# Patient Record
Sex: FEMALE | Race: WHITE | NOT HISPANIC OR LATINO
[De-identification: names, ages, dates, MRNs, and addresses within clinical notes are randomized per-mention and may not be internally consistent; named-entity substitution may affect disease eponyms.]

---

## 2019-11-12 ENCOUNTER — APPOINTMENT (OUTPATIENT)
Dept: ORTHOPEDIC SURGERY | Facility: CLINIC | Age: 84
End: 2019-11-12
Payer: MEDICARE

## 2019-11-12 VITALS
DIASTOLIC BLOOD PRESSURE: 78 MMHG | HEIGHT: 65 IN | WEIGHT: 150 LBS | SYSTOLIC BLOOD PRESSURE: 104 MMHG | HEART RATE: 85 BPM | BODY MASS INDEX: 24.99 KG/M2

## 2019-11-12 PROBLEM — Z00.00 ENCOUNTER FOR PREVENTIVE HEALTH EXAMINATION: Status: ACTIVE | Noted: 2019-11-12

## 2019-11-12 PROCEDURE — 73564 X-RAY EXAM KNEE 4 OR MORE: CPT | Mod: RT

## 2019-11-12 PROCEDURE — 99204 OFFICE O/P NEW MOD 45 MIN: CPT | Mod: 25

## 2019-11-12 PROCEDURE — 20610 DRAIN/INJ JOINT/BURSA W/O US: CPT | Mod: RT

## 2019-11-12 NOTE — PHYSICAL EXAM
[de-identified] : Constitutional - the patient is of normal build and nutrition.  She comes here with her daughter who is a nurse at the [facility] [facility] [employer].  The patient remains oriented to person, time, and  place.  Daughter says that her mother is recently lost her  and at this time sometimes is less orientated as others.   Vital signs as recorded.  The patients gait is WNL. The patient  can stand on toes and heels.  She does appear to be a little off balance at times although she is not falling I feel that balance training with physical therapy may be very beneficial.\par \par The patient has no difficulty with respiration. Respiration at rest is a normal rate. The patient is not short of breath and has not become short of breath with short ambulation. There is no audible wheezing. No coughing.\par \par Skin is normal for the patient's age. There are no abnormal masses or lymph nodes which stand out in the lower extremities.\par \par Spine - deep tendon reflexes are symmetric\par \par \par UPPER EXTREMITIES \par \par Shoulders ROM  is symmetric  and the motion is satisfactory.  There is no significant shoulder pain or limitation in motion which would make using a cane or a walker difficult. Shoulder stability and  strength are satisfactory.\par \par Circulation appears satisfactory with pedal pulses present.  There is no major edema in the lower legs. No skin tenderness or increased temperature. No major varicosities.\par \par HIP EXAMINATION the abduction and abduction as well as rotation measurements were taken with the hip in flexion.\par \par Motion\par Hip flexion                               *[Right 135   Left 135]\par Hip abduction                         *[Right 70      Left 70]\par Hip adduction                         *[Right 35     Left 35]\par Hip external  Rotation             *[Right 65     Left 65]\par Hip Internal Rotation              *[Right 20      Left 20]\par Hip Extension                         *[Right 0        Left 0]\par \par The hips have good range of motion. There is good strength across the hips. There is no crepitus in either hip. The alignment of the hips are normal.\par \par \par KNEE EXAMINATION\par \par Motion\par Right Knee has 10 to 115 degrees of motion with good medial  lateral and anterior posterior stability.  She does have an effusion in her right knee.  She has pain with compression of her patella and may have slight fullness in the popliteal area which may be a small Baker's cyst.  Her main pain is patellofemoral she has good medial lateral and anterior posterior stability at this time but I do she does walk with her knee slightly bent.             \par Left  Knee   has 0 to 135 degrees of motion with good medial lateral and anterior posterior stability.  There is no major effusion there is no Baker's cyst.  There is no significant patellofemoral crepitus.  The patient has satisfactory strength across her knees.            \par \par Ankle and foot examination\par Of the ankle has normal motion.  There is normal ankle stability.  The patient has no major abnormalities of the foot.\par \par \par \par  [de-identified] : 4 views taken the patient's right knee with an AP of the left shows severe patellofemoral degenerative arthritis with bone against bone contact on the right and probable medial narrowing of the joint space which may be consistent with a degenerative medial meniscal tear.

## 2019-11-12 NOTE — REASON FOR VISIT
[Initial Visit] : an initial visit for [Family Member] : family member [FreeTextEntry2] : right knee pain

## 2019-11-12 NOTE — DISCUSSION/SUMMARY
[de-identified] : At this time this patient has osteoarthritis in the right knee especially patellofemoral joint.  I feel she should have a balance of training go to physical therapy in the form is made out I also feel that she may be a safer using a walking device such as a cane.  We will follow her for her knee she will try Aleve 1 or 2 tablets twice a day.  Return visit in 3 months.

## 2019-11-12 NOTE — PROCEDURE
[de-identified] : Procedure Note:\par \par Anatomic Location:  Right  Knee\par \par Diagnosis:  Arthritis\par \par Procedure:  Injection of 2cc  of Marcaine 0.25% plain and Celestone 1cc, 6mg\par \par Local Spray: Ethyl Chloride.\par \par \par Patient has consented for the procedure.\par \par Injection  through a lateral parapatella approach.\par \par Patient tolerated the procedure well.\par \par Patient instructed to call the office if any reaction, fever, chills, increased erythema or swelling.   [phone].

## 2019-11-12 NOTE — HISTORY OF PRESENT ILLNESS
[de-identified] : Ms. NERY HARRIS is a 84 year female who presents to office complaining of right knee pain s/p fall on 11/10/19.\par Patient had noted feeling some form of pain/stiffness in her right knee prior to this fall, but it has since worsened following the fall onto the knee.\par She is c/o a feeling of stiffness in her knee, unable to fully bend or extend the knee without feeling an increase in her pain.\par Patient's  recently passed away, so she and her daughter are concerned that she will be unable to care for herself since she is now living alone.\par As a result, patient has not treated her pain conservatively.\par All review of systems, family history, social history, surgical history, past medical history, medications, and allergies not previously stated as positive are negative. They were reviewed by me today with the patient and documented accordingly.

## 2019-12-04 ENCOUNTER — APPOINTMENT (OUTPATIENT)
Dept: ORTHOPEDIC SURGERY | Facility: CLINIC | Age: 84
End: 2019-12-04
Payer: MEDICARE

## 2019-12-04 DIAGNOSIS — M17.11 UNILATERAL PRIMARY OSTEOARTHRITIS, RIGHT KNEE: ICD-10-CM

## 2019-12-04 PROCEDURE — 99213 OFFICE O/P EST LOW 20 MIN: CPT | Mod: 25

## 2019-12-04 PROCEDURE — 20610 DRAIN/INJ JOINT/BURSA W/O US: CPT | Mod: RT

## 2019-12-04 RX ORDER — CELECOXIB 200 MG/1
200 CAPSULE ORAL DAILY
Qty: 60 | Refills: 2 | Status: ACTIVE | OUTPATIENT
Start: 2019-12-04

## 2019-12-04 NOTE — PROCEDURE
[de-identified] : Procedure Note:\par \par Anatomic Location:  Right Knee\par \par Diagnosis:  Arthritis\par \par Procedure:  Injection of Monovisc\par \par Lot Number:           [id number]                            expiration Date:    April 2022\par \par Local Spray: Ethyl Chloride.\par \par Skin preparation with Alcohol.\par \par Patient has consented for the procedure.\par \par Injection  through a lateral parapatella approach.\par \par Patient tolerated the procedure well.\par \par Patient instructed to call the office if any reaction, fever, chills, increased erythema or swelling.   [phone].

## 2019-12-04 NOTE — HISTORY OF PRESENT ILLNESS
[de-identified] : Ms. NERY HARRIS is an 84 year female who presents to office complaining of right knee pain s/p fall on 11/10/19.\par Last office visit on 11/12/19, she received a cortisone injection into the right knee which did not help at all to relieve any of her pain.\par She was also given a PT script to improve her balance and told to utilize a cane to help with her balance as well.\par She has been doing PT at [facility]. \par However, she hasn't been taking an anti-inflammatory.

## 2019-12-04 NOTE — DISCUSSION/SUMMARY
[de-identified] : Patient does have significant osteoarthritis in her right knee but she and her daughter feel that she would like to stay on anything that is conservative they would like to try 1 of the hyaluronic acids and she was injected with Monovisc today.  She will return in 3 months for follow-up.

## 2019-12-04 NOTE — PHYSICAL EXAM
[de-identified] : \par KNEE EXAMINATION\par \par Motion\par He has not had much of a improvement in her knee.  Her most of physical exam is the same.  Right Knee has 10 to 115 degrees of motion with good medial  lateral and anterior posterior stability.  She does have an effusion in her right knee.  She has pain with compression of her patella and a suprapatella effusion which is still mild and a small Baker's cyst.  Her main pain is patellofemoral she has good medial lateral and anterior posterior stability at this time but I do she does walk with her knee slightly bent.             \par Left  Knee   has 0 to 135 degrees of motion with good medial lateral and anterior posterior stability.  There is no major effusion there is no Baker's cyst.  There is no significant patellofemoral crepitus.  The patient has satisfactory strength across her knees.            \par \par Ankle and foot examination\par Of the ankle has normal motion.  There is normal ankle stability.  The patient has no major abnormalities of the foot.\par \par \par \par

## 2021-11-21 ENCOUNTER — INPATIENT (INPATIENT)
Facility: HOSPITAL | Age: 86
LOS: 9 days | Discharge: NURSING, REHAB OR LONG-TERM CARE | DRG: 605 | End: 2021-12-01
Attending: FAMILY MEDICINE | Admitting: FAMILY MEDICINE
Payer: MEDICARE

## 2021-11-21 VITALS
WEIGHT: 110.01 LBS | RESPIRATION RATE: 16 BRPM | DIASTOLIC BLOOD PRESSURE: 73 MMHG | TEMPERATURE: 98 F | SYSTOLIC BLOOD PRESSURE: 110 MMHG | OXYGEN SATURATION: 96 % | HEART RATE: 107 BPM | HEIGHT: 66 IN

## 2021-11-21 DIAGNOSIS — Z98.890 OTHER SPECIFIED POSTPROCEDURAL STATES: Chronic | ICD-10-CM

## 2021-11-21 LAB
ALBUMIN SERPL ELPH-MCNC: 4 G/DL — SIGNIFICANT CHANGE UP (ref 3.3–5)
ALP SERPL-CCNC: 53 U/L — SIGNIFICANT CHANGE UP (ref 40–120)
ALT FLD-CCNC: 8 U/L — LOW (ref 10–45)
ANION GAP SERPL CALC-SCNC: 11 MMOL/L — SIGNIFICANT CHANGE UP (ref 5–17)
AST SERPL-CCNC: 17 U/L — SIGNIFICANT CHANGE UP (ref 10–40)
BASOPHILS # BLD AUTO: 0.01 K/UL — SIGNIFICANT CHANGE UP (ref 0–0.2)
BASOPHILS NFR BLD AUTO: 0.1 % — SIGNIFICANT CHANGE UP (ref 0–2)
BILIRUB SERPL-MCNC: 0.4 MG/DL — SIGNIFICANT CHANGE UP (ref 0.2–1.2)
BUN SERPL-MCNC: 19 MG/DL — SIGNIFICANT CHANGE UP (ref 7–23)
CALCIUM SERPL-MCNC: 9.2 MG/DL — SIGNIFICANT CHANGE UP (ref 8.4–10.5)
CHLORIDE SERPL-SCNC: 102 MMOL/L — SIGNIFICANT CHANGE UP (ref 96–108)
CO2 SERPL-SCNC: 23 MMOL/L — SIGNIFICANT CHANGE UP (ref 22–31)
CREAT SERPL-MCNC: 0.43 MG/DL — LOW (ref 0.5–1.3)
EOSINOPHIL # BLD AUTO: 0.01 K/UL — SIGNIFICANT CHANGE UP (ref 0–0.5)
EOSINOPHIL NFR BLD AUTO: 0.1 % — SIGNIFICANT CHANGE UP (ref 0–6)
GLUCOSE SERPL-MCNC: 130 MG/DL — HIGH (ref 70–99)
HCT VFR BLD CALC: 35.8 % — SIGNIFICANT CHANGE UP (ref 34.5–45)
HGB BLD-MCNC: 11.7 G/DL — SIGNIFICANT CHANGE UP (ref 11.5–15.5)
IMM GRANULOCYTES NFR BLD AUTO: 0.3 % — SIGNIFICANT CHANGE UP (ref 0–1.5)
LYMPHOCYTES # BLD AUTO: 1.36 K/UL — SIGNIFICANT CHANGE UP (ref 1–3.3)
LYMPHOCYTES # BLD AUTO: 17.9 % — SIGNIFICANT CHANGE UP (ref 13–44)
MCHC RBC-ENTMCNC: 31.7 PG — SIGNIFICANT CHANGE UP (ref 27–34)
MCHC RBC-ENTMCNC: 32.7 GM/DL — SIGNIFICANT CHANGE UP (ref 32–36)
MCV RBC AUTO: 97 FL — SIGNIFICANT CHANGE UP (ref 80–100)
MONOCYTES # BLD AUTO: 0.58 K/UL — SIGNIFICANT CHANGE UP (ref 0–0.9)
MONOCYTES NFR BLD AUTO: 7.6 % — SIGNIFICANT CHANGE UP (ref 2–14)
NEUTROPHILS # BLD AUTO: 5.61 K/UL — SIGNIFICANT CHANGE UP (ref 1.8–7.4)
NEUTROPHILS NFR BLD AUTO: 74 % — SIGNIFICANT CHANGE UP (ref 43–77)
NRBC # BLD: 0 /100 WBCS — SIGNIFICANT CHANGE UP (ref 0–0)
PLATELET # BLD AUTO: 180 K/UL — SIGNIFICANT CHANGE UP (ref 150–400)
POTASSIUM SERPL-MCNC: 4 MMOL/L — SIGNIFICANT CHANGE UP (ref 3.5–5.3)
POTASSIUM SERPL-SCNC: 4 MMOL/L — SIGNIFICANT CHANGE UP (ref 3.5–5.3)
PROT SERPL-MCNC: 7.1 G/DL — SIGNIFICANT CHANGE UP (ref 6–8.3)
RBC # BLD: 3.69 M/UL — LOW (ref 3.8–5.2)
RBC # FLD: 14.4 % — SIGNIFICANT CHANGE UP (ref 10.3–14.5)
SODIUM SERPL-SCNC: 136 MMOL/L — SIGNIFICANT CHANGE UP (ref 135–145)
WBC # BLD: 7.59 K/UL — SIGNIFICANT CHANGE UP (ref 3.8–10.5)
WBC # FLD AUTO: 7.59 K/UL — SIGNIFICANT CHANGE UP (ref 3.8–10.5)

## 2021-11-21 PROCEDURE — 72192 CT PELVIS W/O DYE: CPT | Mod: 26,MA

## 2021-11-21 PROCEDURE — 73502 X-RAY EXAM HIP UNI 2-3 VIEWS: CPT | Mod: 26,LT

## 2021-11-21 PROCEDURE — 99284 EMERGENCY DEPT VISIT MOD MDM: CPT

## 2021-11-21 PROCEDURE — 93010 ELECTROCARDIOGRAM REPORT: CPT

## 2021-11-21 PROCEDURE — 76377 3D RENDER W/INTRP POSTPROCES: CPT | Mod: 26

## 2021-11-21 RX ORDER — LIDOCAINE 4 G/100G
1 CREAM TOPICAL ONCE
Refills: 0 | Status: COMPLETED | OUTPATIENT
Start: 2021-11-21 | End: 2021-11-21

## 2021-11-21 RX ORDER — ACETAMINOPHEN 500 MG
650 TABLET ORAL ONCE
Refills: 0 | Status: COMPLETED | OUTPATIENT
Start: 2021-11-21 | End: 2021-11-21

## 2021-11-21 RX ORDER — KETOROLAC TROMETHAMINE 30 MG/ML
15 SYRINGE (ML) INJECTION ONCE
Refills: 0 | Status: DISCONTINUED | OUTPATIENT
Start: 2021-11-21 | End: 2021-11-21

## 2021-11-21 RX ADMIN — Medication 15 MILLIGRAM(S): at 19:29

## 2021-11-21 RX ADMIN — Medication 1 MILLIGRAM(S): at 23:50

## 2021-11-21 RX ADMIN — Medication 650 MILLIGRAM(S): at 15:14

## 2021-11-21 RX ADMIN — LIDOCAINE 1 PATCH: 4 CREAM TOPICAL at 19:51

## 2021-11-21 NOTE — ED PROVIDER NOTE - CARE PLAN
Principal Discharge DX:	Left hip pain   1 Principal Discharge DX:	Left hip pain  Secondary Diagnosis:	Dementia

## 2021-11-21 NOTE — ED ADULT TRIAGE NOTE - CHIEF COMPLAINT QUOTE
sent from [facility] for atraumatic hip pain  found to be in AFib by EMS

## 2021-11-21 NOTE — ED PROVIDER NOTE - PHYSICAL EXAMINATION
PHYSICAL EXAM:  CONSTITUTIONAL: Elderly, pleasant, awake, alert, and in no apparent distress.  HEAD: Atraumatic. No lesions or deformities. No tenderness of scalp.  EYES: Clear bilaterally, pupils equal, round and reactive to light.  ENMT: Airway patent, Nasal mucosa clear. Mouth with normal mucosa. Uvula is midline.   CARDIAC: Normal rate, regular rhythm.  +S1/S2.  No murmurs, rubs or gallops.  RESPIRATORY: Breathing unlabored. Breath sounds clear and equal bilaterally.  ABDOMEN:  Soft, nontender, nondistended.   NEUROLOGICAL: Alert and oriented, no focal deficits. Sensation intact x4 extremities. Strength 5/5 of lower extremities B/L.   MSK: Full ROM of the hips bilaterally. No pain with active or passive ROM. No bony tenderness of upper or lower extremities B/L.  SPINE: No midline tenderness of C-, L-, or S-spine.  SKIN: Skin warm and dry. No evidence of rashes or lesions. PHYSICAL EXAM:  CONSTITUTIONAL: Elderly, pleasant, awake, alert, and in no apparent distress.  HEAD: Atraumatic. No lesions or deformities. No tenderness of scalp.  EYES: Clear bilaterally, pupils equal, round and reactive to light.  ENMT: Airway patent, Nasal mucosa clear. Mouth with normal mucosa. Uvula is midline.   CARDIAC: Normal rate, regular rhythm.  +S1/S2.  No murmurs, rubs or gallops.  RESPIRATORY: Breathing unlabored. Breath sounds clear and equal bilaterally.  ABDOMEN:  Soft, nontender, nondistended.   NEUROLOGICAL: Alert and oriented, no focal deficits. Sensation intact x4 extremities. Strength 5/5 of lower extremities B/L.   MSK: Full ROM of the hips bilaterally. No pain with active or passive ROM. No bony tenderness of upper or lower extremities B/L.  SPINE: No midline tenderness of C-, L-, or S-spine.  SKIN: Skin warm and dry. No evidence of rashes, ecchymosis, or lesions. Extremities well perfused.

## 2021-11-21 NOTE — ED PROVIDER NOTE - OBJECTIVE STATEMENT
86 year old female with PMH dementia, A fib, mitral valve repair brought in by EMS from [facility] with L hip pain. Per EMS staff reported no witnessed falls or signs. Patient states she does not remember falling but started having right back pain radiating to lateral hip today. Patient denies numbness, tingling, fever, chills, chest pain, shortness of breath, headache. Ambulates with walker at baseline. 86 year old female with PMH dementia, A fib, mitral valve repair brought in by EMS from [facility] with L hip pain. Per EMS staff reported no witnessed falls or signs of trauma. Patient states she does not remember falling but started having right back pain radiating to lateral hip today. Patient denies numbness, tingling, fever, chills, chest pain, shortness of breath, headache. Ambulates with walker at baseline. 86 year old female with PMH dementia, A fib, mitral valve repair brought in by EMS from [facility] with L hip pain. Per EMS staff reported no witnessed falls or signs of trauma. Patient states she does not remember falling but started having left back pain radiating to left lateral hip today. Patient denies numbness, tingling, fever, chills, chest pain, shortness of breath, headache. Ambulates with walker at baseline.

## 2021-11-21 NOTE — ED PROVIDER NOTE - CLINICAL SUMMARY MEDICAL DECISION MAKING FREE TEXT BOX
Maddie Bello DO PGY-1  86 year old female with PMH dementia, a fib, mitral valve repair presents with atraumatic L hip pain this AM. Physical demonstrates full ROM of B/L hips, no bony tenderness, and well perfused extremities. No signs of trauma. Patient appears clinically well, pain likely radicular vs. osteoarthritis. Will xray hip and pelvis, treat pain, and re-evaluate. Maddie Bello DO PGY-1  86 year old female with PMH dementia, a fib, mitral valve repair presents with atraumatic L hip pain this AM. Physical demonstrates full ROM of B/L hips, no bony tenderness, and well perfused extremities. No signs of trauma. Patient appears clinically well, pain likely radicular vs. osteoarthritis. Will xray hip and pelvis, treat pain, and re-evaluate.    BRUNO Moss, Attending: seen with resident and reviewed note.    Elderly female, dementia living at [facility] p/w likely atraumatic L hip pain. Pt found sitting in chair and did states she fell even tho less likely given assisted living status, dementia, and no signs of trauma. No prior hip fxs/surgeries. No f/c. No other complaints.     Mild painful ROM with external rotation L hip. Distal NV exam intact. Compartments soft. No abd ttp. No inguinal ttp or masses. No other signs of trauma.    Suspect MSK cause of pain such as peripheral nerve issue vs arthritis. However given possibility of fall and pain will purse occult fx. No concern for vascular issue or septic joint. Daughter amenable to plan.

## 2021-11-21 NOTE — ED ADULT NURSE NOTE - OBJECTIVE STATEMENT
85 y/o female presents to ED via EMS from [facility] c/o L hip pain, denies any injury, falls, or hx hip pain. Denies chest pain, sob, numbness or tingling, urinary or bowel symptoms, n/v/d. Pt has hx dementia and is normally a&ox2, and afib on Eliquis. A&Ox2 to person and place, no edema, abd soft nontender, no obvious bony deformity. Normal ROM of L hip, not tender to palp. Skin warm dry and intact.

## 2021-11-21 NOTE — ED PROVIDER NOTE - PROGRESS NOTE DETAILS
Maddie Bello, DO PGY-1   Patient continuing to require pain medication in ED. Discussion with patient's daughter who fears that patient will not be able to perform ADLs at [facility] assisted [facility], as she is not able to ambulate well. Plan to admit for pain control and PT/OT evaluation. CT pending.

## 2021-11-22 DIAGNOSIS — M25.552 PAIN IN LEFT HIP: ICD-10-CM

## 2021-11-22 LAB — SARS-COV-2 RNA SPEC QL NAA+PROBE: SIGNIFICANT CHANGE UP

## 2021-11-22 RX ORDER — DIVALPROEX SODIUM 500 MG/1
500 TABLET, DELAYED RELEASE ORAL DAILY
Refills: 0 | Status: DISCONTINUED | OUTPATIENT
Start: 2021-11-22 | End: 2021-11-22

## 2021-11-22 RX ORDER — DIVALPROEX SODIUM 500 MG/1
250 TABLET, DELAYED RELEASE ORAL DAILY
Refills: 0 | Status: DISCONTINUED | OUTPATIENT
Start: 2021-11-22 | End: 2021-11-22

## 2021-11-22 RX ORDER — HALOPERIDOL DECANOATE 100 MG/ML
1 INJECTION INTRAMUSCULAR ONCE
Refills: 0 | Status: COMPLETED | OUTPATIENT
Start: 2021-11-22 | End: 2021-11-22

## 2021-11-22 RX ORDER — DIVALPROEX SODIUM 500 MG/1
500 TABLET, DELAYED RELEASE ORAL DAILY
Refills: 0 | Status: DISCONTINUED | OUTPATIENT
Start: 2021-11-22 | End: 2021-12-01

## 2021-11-22 RX ORDER — APIXABAN 2.5 MG/1
2.5 TABLET, FILM COATED ORAL
Refills: 0 | Status: DISCONTINUED | OUTPATIENT
Start: 2021-11-22 | End: 2021-12-01

## 2021-11-22 RX ADMIN — HALOPERIDOL DECANOATE 1 MILLIGRAM(S): 100 INJECTION INTRAMUSCULAR at 18:25

## 2021-11-22 RX ADMIN — LIDOCAINE 1 PATCH: 4 CREAM TOPICAL at 14:27

## 2021-11-22 RX ADMIN — APIXABAN 2.5 MILLIGRAM(S): 2.5 TABLET, FILM COATED ORAL at 17:19

## 2021-11-22 RX ADMIN — LIDOCAINE 1 PATCH: 4 CREAM TOPICAL at 11:43

## 2021-11-22 RX ADMIN — HALOPERIDOL DECANOATE 1 MILLIGRAM(S): 100 INJECTION INTRAMUSCULAR at 23:10

## 2021-11-22 NOTE — ED ADULT NURSE REASSESSMENT NOTE - NS ED NURSE REASSESS COMMENT FT1
1425 Daughter at bedside and pt provided lunch and ate most of ot Pt assisted to the bathroom via wheelchair pt pending a bed assignment Carmelina

## 2021-11-22 NOTE — ED ADULT NURSE REASSESSMENT NOTE - NS ED NURSE REASSESS COMMENT FT1
1515 Emily Np covering aware of medication Depakote 500 Mg once daily and Eliquis 2.5  once a day to fix an d tylenols ordered order to be written Prn Report given to holding RN and to be brought over Carmelina

## 2021-11-22 NOTE — H&P ADULT - HISTORY OF PRESENT ILLNESS
SDAT, wanders all day in assisted refused to walk sio dtr brought her to the ed co acute  pain l hip  denied fall but now ad mits she fell, no bruises CT , xr neg   stil refuses to ambulates  confabulates , confused at times as usual

## 2021-11-22 NOTE — ED ADULT NURSE REASSESSMENT NOTE - NS ED NURSE REASSESS COMMENT FT1
0889 Safety maintained pt alert to herself and placed in front of nursed station for supervision Pending bed assignment Carmelina

## 2021-11-22 NOTE — H&P ADULT - NSHPSOCIALHISTORY_GEN_ALL_CORE
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

## 2021-11-22 NOTE — H&P ADULT - NSHPREVIEWOFSYSTEMS_GEN_ALL_CORE
denies fc, no ha/d, awake alert confused  no co no ch vision  hears  no co no ch  no cp nop p[alp nop sob  nop cough no sp  no nvcd   nofdi  still co pain left hip but less no other a&P   no polyudd  no r/i/d skin  AOSN

## 2021-11-22 NOTE — H&P ADULT - REASON FOR ADMISSION
refused to ambuklate co pain L hip at first denied fall but now admits she fell , hX dementia memory poor but tessy wallis

## 2021-11-22 NOTE — H&P ADULT - NSHPLABSRESULTS_GEN_ALL_CORE
"[facility] Consult/H&P Note      Patient's Name/MRN: Mari Dillon, [id number]    Admit Date:11/19/2021  Today's Date: 11/19/2021   Length of stay:  LOS: 0 days   Room #: -ROOM 4/04      Reason for consult/chief complaint: Flank pain nausea  ID/HPI: Mari Dillon is a 51 y.o. female patient who presents with flank pain that started approximately 1 month ago, and is associated with nausea/vomiting. In the ER imaging/CT demonstrated 4 mm left proximal ureteral stone. There are no other stones appreciated.  She was originally seen 1 month ago in the ER at that time treated for an E. coli urinary tract infection.  Stone was in a similar location.  She was seen by Dr. Riki Petty please see below discussion from his clinic note for further details.    Labs reviewed:   Wbc 7.4  Serum Cr: 0.73  Urinalysis showed: Negative for signs of infection    The patient is hemodynamically stable.   The patient denies current use of blood thinning medication    This is the patient's 2# stone episode.   Previous treatment included: Medical expulsion therapy and passed on its own  Current preventative methods/medications: None    She was seen 11/2/21 by Dr. Petty at [facility],   \"In the office today, we reviewed Ms. Dillon's recent urgent care visit due to suspected UTI with urgency, frequency, and dysuria. She had a UTI and the urine culture was positive for E. coli. She was given augmentin and has not been taking it consistently. I advised she finish her augmentin.     She had a CT done at urgent care and we reviewed the imaging showing 5.4 mm left promxial ureteral stone. I explained that due to her stone size I recommended a L CULTS. I advised she strain her urine and that if she passes the stone we can cancel the surgery.     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 She developed an anastomotic leak and is s/p ileostomy 6/11/2021. She reports she was hopsitalized for ~1.5 months at that " "time. I explained her stone may have formed during this time. \"     Past Medical History:   Past Medical History:   Diagnosis Date   • Anxiety 6/9/2009        Past Surgical History:   Past Surgical History:   Procedure Laterality Date   • PB EXPLORATORY OF ABDOMEN  6/11/2021    Procedure: LAPAROTOMY, EXPLORATORY;  Surgeon: Maryam Wood M.D.;  Location: [facility];  Service: General   • ILEOSTOMY  6/11/2021    Procedure: WOUND VAC PLACEMENT ;  Surgeon: Maryam Wood M.D.;  Location: [facility];  Service: General   • PB EXPLORATORY OF ABDOMEN N/A 6/4/2021    Procedure: LAPAROTOMY, EXPLORATORY, WITH WASH OUT;  Surgeon: Maryam Wood M.D.;  Location: [facility];  Service: General   • PB EXPLORATORY OF ABDOMEN N/A 5/28/2021    Procedure: LAPAROTOMY, EXPLORATORY- RESECTION OF ILEOCECAL ANASTATMOSIS.;  Surgeon: Maryam Wood M.D.;  Location: [facility];  Service: General   • PB EXPLORATORY OF ABDOMEN  5/22/2021    Procedure: LAPAROTOMY, EXPLORATORY;  Surgeon: Maryam Wood M.D.;  Location: [facility];  Service: General   • HEMICOLECTOMY, RIGHT  5/22/2021    Procedure: HEMICOLECTOMY, RIGHT, SIDE TO SIDE ANASTOMOSIS;  Surgeon: Maryam Wood M.D.;  Location: [facility];  Service: General   • SHOULDER DECOMPRESSION ARTHROSCOPIC Right 12/24/2018    Procedure: SHOULDER DECOMPRESSION ARTHROSCOPIC - SUBACROMIAL;  Surgeon: Tristian Garcia M.D.;  Location: [facility];  Service: Orthopedics   • CLAVICLE DISTAL EXCISION Left 12/24/2018    Procedure: CLAVICLE DISTAL EXCISION;  Surgeon: Tristian Garcia M.D.;  Location: [facility];  Service: Orthopedics   • SHOULDER ARTHROSCOPY W/ BICIPITAL TENODESIS REPAIR Left 12/24/2018    Procedure: SHOULDER ARTHROSCOPY W/ BICIPITAL TENODESIS REPAIR - AND PACKER;  Surgeon: Tristian Garcia M.D.;  Location: [facility];  Service: Orthopedics   • SHOULDER MANIPULATION Left " 2018    Procedure: SHOULDER MANIPULATION;  Surgeon: Tristian Garcia M.D.;  Location: SUR[facility];  Service: Orthopedics   • SHOULDER ARTHROSCOPY W/ BICIPITAL TENODESIS REPAIR Right 6/15/2015    Procedure: SHOULDER ARTHROSCOPY W/ BICIPITAL TENODESIS, SYNOVECTOMY;  Surgeon: Tristian Garcia M.D.;  Location: [facility];  Service:    • CLAVICLE DISTAL EXCISION Right 6/15/2015    Procedure: CLAVICLE DISTAL EXCISION;  Surgeon: Tristian Garcia M.D.;  Location: [facility];  Service:    • SHOULDER DECOMPRESSION Right 6/15/2015    Procedure: SHOULDER DECOMPRESSION MINI INCISION ;  Surgeon: Tristian Garcia M.D.;  Location: [facility];  Service:    • OTHER      nose   • APPENDECTOMY     • PB  DELIVERY ONLY          Family History:   Family History   Problem Relation Age of Onset   • Hypertension Mother    • Diabetes Mother    • Cancer Maternal Grandmother         breast         Social History:   Social History     Tobacco Use   • Smoking status: Former Smoker     Years: 15.00     Quit date: 2005     Years since quittin.8   • Smokeless tobacco: Never Used   • Tobacco comment: 3-4 years ago   Vaping Use   • Vaping Use: Never used   Substance Use Topics   • Alcohol use: No     Alcohol/week: 0.0 oz   • Drug use: Yes     Types: Inhaled, Marijuana     Comment: Marijuana daily      Social History     Social History Narrative    ** Merged History Encounter **             Allergies: she Patient has no known allergies.    Medications:   (Not in a hospital admission)        Review of Systems  Review of Systems   Constitutional: Negative.    HENT: Negative.    Eyes: Negative.    Respiratory: Negative.    Cardiovascular: Negative.    Gastrointestinal: Negative.    Genitourinary: Positive for flank pain and hematuria.   Skin: Negative.    Neurological: Negative.    Endo/Heme/Allergies: Negative.    Psychiatric/Behavioral: Negative.      "    Physical Exam  VITAL SIGNS: /72   Pulse 78   Temp 36.2 °C (97.2 °F) (Temporal)   Resp 16   Ht 1.753 m (5' 9\")   Wt 65 kg (143 lb 4.8 oz)   LMP 11/13/2021 (Exact Date)   SpO2 99%   BMI 21.16 kg/m²   Physical Exam  Vitals reviewed.   HENT:      Head: Normocephalic and atraumatic.   Eyes:      Conjunctiva/sclera: Conjunctivae normal.      Pupils: Pupils are equal, round, and reactive to light.   Cardiovascular:      Rate and Rhythm: Normal rate and regular rhythm.   Pulmonary:      Effort: Pulmonary effort is normal.      Breath sounds: Normal breath sounds.   Abdominal:      General: Abdomen is flat.      Palpations: Abdomen is soft.   Musculoskeletal:         General: No tenderness.      Cervical back: Normal range of motion and neck supple.   Skin:     General: Skin is warm and dry.   Neurological:      Mental Status: She is alert and oriented to person, place, and time.   Psychiatric:         Mood and Affect: Mood and affect normal.         Cognition and Memory: Memory normal.         Judgment: Judgment normal.           Labs:  Recent Labs     11/19/21  0902   WBC 7.4   RBC 4.86   HEMOGLOBIN 12.9   HEMATOCRIT 40.4   MCV 83.1   MCH 26.5*   MCHC 31.9*   RDW 58.3*   PLATELETCT 314   MPV 9.8     Recent Labs     11/19/21  0902   SODIUM 138   POTASSIUM 3.9   CHLORIDE 104   CO2 21   GLUCOSE 90   BUN 10   CREATININE 0.73   CALCIUM 9.2         Glucose:  Recent Labs     11/19/21  0902   GLUCOSE 90     Coags:  No results for input(s): INR in the last 72 hours.      Urinalysis:   Recent Labs     11/19/21  0902   COLORURINE Yellow   CLARITY Clear   SPECGRAVITY 1.020   PHURINE 6.5   GLUCOSEUR Negative   KETONES 15*   NITRITE Negative   OCCULTBLOOD Negative       Imaging:  CT-ABDOMEN-PELVIS WITH   Final Result      1.  No evidence of bowel obstruction or focal inflammatory change.      2.  Complex left ovarian cyst which measures 5 x 2 cm in size.      3.  Again seen 6 mm left proximal ureteral stone with " minimal dilatation of the left ureter proximal to that area.      4.  Fatty liver.      US-PELVIC COMPLETE (TRANSABDOMINAL/TRANSVAGINAL) (COMBO)    (Results Pending)       @lastct@     Assessment:    Mari Dillon is a 51 y.o. female patient with left proximal 4 mm ureteral stone without signs of infection.  The stone has not migrated distally in 1 month when you compare to previous CAT scans.  The patient reports malaise nausea vomiting and overall abdominal discomfort that is preventing her from carrying on her normal activities.  She desires definitive stone treatment.    Plan:  To OR for left ureteroscopic treatment of kidney/ureteral stones.   After OR patient would like to be discharged this evening and as long as she is appropriately recovering in the recovery room I think this is more than possible.  Disposition will be dictated in operative report and discussed in subsequent urology team progress notes.    Risks of treatment of kidney stones (ureteroscopy or cysto with ureteral stent):  inability to access ureter, the need for second procedures, the possibility of negative ureteroscopy,  stent discomfort until this is removed, bleeding, infection, ureteral injury or stricture, bladder injury, post op urinary retention requiring david catheter, and the general pulmonary and cardiovascular risks associated with anesthesia.   Risks of not treating include:  Pain, possible growth of kidney stones, obstruction of kidney causing kidney failure, obstruction with infection can cause urosepsis and death.   Alternatives to treating ureteral or kidney stones:  Medical expulsive therapy with pain control and reimaging, other stone surgeries.   Benefits include:  Removal of ureteral stones, improvement in pain and drainage of kidney, and possibly faster return to work compared to medical expulsive therapy.     This is a general summary, not an exhaustive list, and some other rare but known  risks/benefits/alternatives are published in urologic literature.     Jose Omalley MD  [facility] [place]          xr, ct neg for fx  needs MRI of Hip

## 2021-11-22 NOTE — H&P ADULT - NSHPPHYSICALEXAM_GEN_ALL_CORE
vss nad   nc at   natalie cwnl mmm  no cb no tm  lungs ctab no rr, end insp wheeze x 1 r cleared after 1 breath  rrrm  abd flat bs+ soft nt no g  no cce  skin cdi  hm- vv mild le bl  neuro speech clear sffecr approp bur gets agitated and wants to be oovb   m, j, o, cog all impaired by advanced mem loss  sens gr nl motor nf atmae  gait was indep pta

## 2021-11-22 NOTE — H&P ADULT - ASSESSMENT
needs PT eval, if patient can anmbulate can return to [facility] , if she has pain and will not ambulate she willneed furtherwu and possibl;ey CHAN  on eliquis at home pta and mvi  and depakote 500mg pauly qam  Disc c Dtr maged [phone]  1. ?fall  2. Pain L hip r/o Fx  3. Alzheimers type dementia no behavior issues - depakote - can decr doase and observe  4PAF -   5, MVR

## 2021-11-22 NOTE — ED ADULT NURSE REASSESSMENT NOTE - NS ED NURSE REASSESS COMMENT FT1
1106 Pt tolieted and assisted to the bathroom and Pt urinated Pt non able to stand and weight bare n her left leg

## 2021-11-23 LAB
COVID-19 NUCLEOCAPSID GAM AB INTERP: POSITIVE
COVID-19 NUCLEOCAPSID TOTAL GAM ANTIBODY RESULT: 26.4 INDEX — HIGH
COVID-19 SPIKE DOMAIN AB INTERP: POSITIVE
COVID-19 SPIKE DOMAIN ANTIBODY RESULT: >250 U/ML — HIGH
CRP SERPL-MCNC: <3 MG/L — SIGNIFICANT CHANGE UP (ref 0–4)
SARS-COV-2 IGG+IGM SERPL QL IA: 26.4 INDEX — HIGH
SARS-COV-2 IGG+IGM SERPL QL IA: >250 U/ML — HIGH
SARS-COV-2 IGG+IGM SERPL QL IA: POSITIVE
SARS-COV-2 IGG+IGM SERPL QL IA: POSITIVE

## 2021-11-23 PROCEDURE — 93010 ELECTROCARDIOGRAM REPORT: CPT

## 2021-11-23 RX ORDER — METOPROLOL TARTRATE 50 MG
25 TABLET ORAL ONCE
Refills: 0 | Status: COMPLETED | OUTPATIENT
Start: 2021-11-23 | End: 2021-11-23

## 2021-11-23 RX ADMIN — APIXABAN 2.5 MILLIGRAM(S): 2.5 TABLET, FILM COATED ORAL at 18:54

## 2021-11-23 RX ADMIN — APIXABAN 2.5 MILLIGRAM(S): 2.5 TABLET, FILM COATED ORAL at 05:36

## 2021-11-23 RX ADMIN — Medication 25 MILLIGRAM(S): at 21:29

## 2021-11-23 NOTE — PHYSICAL THERAPY INITIAL EVALUATION ADULT - PERTINENT HX OF CURRENT PROBLEM, REHAB EVAL
86 y/oF  with Alzheimers dementia, admitted 11/22, from [facility]. Pt ambulates independently at baseline (wanders), refused to walk, sent her to ED. Pt initially with acute pain to L hip, at first denied, then admitted to fall. No bruising, CT and xray neg.

## 2021-11-24 LAB
ERYTHROCYTE [SEDIMENTATION RATE] IN BLOOD: 30 MM/HR — HIGH (ref 0–20)
ERYTHROCYTE [SEDIMENTATION RATE] IN BLOOD: 34 MM/HR — HIGH (ref 0–20)

## 2021-11-24 PROCEDURE — 72195 MRI PELVIS W/O DYE: CPT | Mod: 26

## 2021-11-24 RX ORDER — METOPROLOL TARTRATE 50 MG
25 TABLET ORAL DAILY
Refills: 0 | Status: DISCONTINUED | OUTPATIENT
Start: 2021-11-25 | End: 2021-12-01

## 2021-11-24 RX ADMIN — APIXABAN 2.5 MILLIGRAM(S): 2.5 TABLET, FILM COATED ORAL at 17:55

## 2021-11-24 RX ADMIN — Medication 1 MILLIGRAM(S): at 19:52

## 2021-11-24 RX ADMIN — APIXABAN 2.5 MILLIGRAM(S): 2.5 TABLET, FILM COATED ORAL at 04:59

## 2021-11-24 RX ADMIN — DIVALPROEX SODIUM 500 MILLIGRAM(S): 500 TABLET, DELAYED RELEASE ORAL at 12:31

## 2021-11-24 NOTE — CONSULT NOTE ADULT - REASON FOR ADMISSION
refused to ambulate co pain L hip at first denied fall but now admits she fell , hX dementia memory poor but tessy wallis
refused to ambuklate co pain L hip at first denied fall but now admits she fell , hX dementia memory poor but tessy wallis

## 2021-11-24 NOTE — CHART NOTE - NSCHARTNOTEFT_GEN_A_CORE
During the night -130's. Pt seen at the bedside. Denies chest pain, palpitations, dyspnea, abdominal or back pain. Pt with Hx of Afib, on Eliquis. No fever, no pain complaints. Pt States " My heart races sometimes". D/w Dr. Dewitt, lopressor 25 mg tab x1, will reevaluate in AM. Monitoring closely. During the night -130's. Pt seen at the bedside. Denies chest pain, palpitations, dyspnea, abdominal or back pain. Pt with Hx of Afib, on Eliquis. No fever, no pain complaints. Pt States " My heart races sometimes". EKG stat. D/w Dr. Dewitt, lopressor 25 mg tab x1, will reevaluate in AM. Monitoring closely.

## 2021-11-24 NOTE — CONSULT NOTE ADULT - SUBJECTIVE AND OBJECTIVE BOX
86y Female admitted to medicine pending an MRI for left hip pain. Per primary team, patient had developed some left hip pain and was walking with a limp. Patient seen and examined, denies any hip pain. Denies injury. Patient denies numbness/tingling/burning in the affected extremity. No other bone/joint complaints. Patient is a community ambulator at baseline      PAST MEDICAL & SURGICAL HISTORY:  Atrial fibrillation    Asthma    History of repair of mitral valve      MEDICATIONS  (STANDING):  apixaban 2.5 milliGRAM(s) Oral two times a day  diVALproex  milliGRAM(s) Oral daily    MEDICATIONS  (PRN):    Allergies    penicillin (Unknown)    Intolerances                              11.7   7.59  )-----------( 180      ( 21 Nov 2021 19:34 )             35.8     11-21    136  |  102  |  19  ----------------------------<  130<H>  4.0   |  23  |  0.43<L>    Ca    9.2      21 Nov 2021 19:34    TPro  7.1  /  Alb  4.0  /  TBili  0.4  /  DBili  x   /  AST  17  /  ALT  8<L>  /  AlkPhos  53  11-21        T(C): 37.2 (11-23-21 @ 12:41), Max: 37.2 (11-23-21 @ 12:41)  HR: 96 (11-23-21 @ 12:41) (85 - 108)  BP: 123/70 (11-23-21 @ 12:41) (117/58 - 145/79)  RR: 18 (11-23-21 @ 12:41) (17 - 18)  SpO2: 95% (11-23-21 @ 12:41) (94% - 98%)  Wt(kg): --    PE   LLE:  Skin intact; No ecchymosis/soft tissue swelling  Compartments soft; No  TTP about hip. No TTP to knee/leg/ankle/foot   ROM fully intact, active and passive  Able to SLR   Motor intact GS/TA/FHL/EHL  SILT L2-S1  DP/PT pulses 2+       Imaging:  XR demonstrating arthritic changes     86y Female with probable left hip arthritis   - FU ESR/CRP  - FU MRI  - Pain control  - Final plans pending imaging and labs
CHIEF COMPLAINT:Patient is a 86y old  Female who presents with a chief complaint of refused to ambuklate co pain L hip at first denied fall but now admits she fell , hX dementia memory poor but usu aurora wallis (24 Nov    HPI:  bimal KHANNA all day in [facility] refused to walk sio dtr brought her to the ed co acute  pain l hip  denied fall but now , no bruises CT , xr neg stil refuses to ambulates  confabulates , confused at times as usual  pt with rapid a,fib      PAST MEDICAL & SURGICAL HISTORY:  Atrial fibrillation    Asthma    History of repair of mitral valve    MEDICATIONS  (STANDING):  apixaban 2.5 milliGRAM(s) Oral two times a day  diVALproex  milliGRAM(s) Oral daily  LORazepam Tablet 1 milliGRAM(s) Oral once    MEDICATIONS  (PRN):      FAMILY HISTORY:      SOCIAL HISTORY:    [ ] Non-smoker  [ ] Smoker  [ ] Alcohol    Allergies    penicillin (Unknown)    Intolerances    	    REVIEW OF SYSTEMS:  CONSTITUTIONAL: No fever, weight loss, or fatigue  EYES: No eye pain, visual disturbances, or discharge  ENT:  No difficulty hearing, tinnitus, vertigo; No sinus or throat pain  NECK: No pain or stiffness  RESPIRATORY: No cough, wheezing, chills or hemoptysis; No Shortness of Breath  CARDIOVASCULAR: No chest pain, palpitations, passing out, dizziness, or leg swelling  GASTROINTESTINAL: No abdominal or epigastric pain. No nausea, vomiting, or hematemesis; No diarrhea or constipation. No melena or hematochezia.  GENITOURINARY: No dysuria, frequency, hematuria, or incontinence  NEUROLOGICAL: No headaches, memory loss, loss of strength, numbness, or tremors  SKIN: No itching, burning, rashes, or lesions   LYMPH Nodes: No enlarged glands  ENDOCRINE: No heat or cold intolerance; No hair loss  MUSCULOSKELETAL: No joint pain or swelling; No muscle, back, or extremity pain  PSYCHIATRIC: No depression, anxiety, mood swings, or difficulty sleeping  HEME/LYMPH: No easy bruising, or bleeding gums  ALLERGY AND IMMUNOLOGIC: No hives or eczema	    [ ] All others negative	  [x ] Unable to obtain    PHYSICAL EXAM:  T(C): 36.9 (11-24-21 @ 08:36), Max: 37.2 (11-23-21 @ 12:41)  HR: 88 (11-24-21 @ 08:36) (88 - 128)  BP: 120/62 (11-24-21 @ 08:36) (120/62 - 143/81)  RR: 18 (11-24-21 @ 08:36) (18 - 18)  SpO2: 93% (11-24-21 @ 08:36) (93% - 98%)  Wt(kg): --  I&O's Summary    23 Nov 2021 07:01  -  24 Nov 2021 07:00  --------------------------------------------------------  IN: 480 mL / OUT: 0 mL / NET: 480 mL        Appearance: Normal	  HEENT:   Normal oral mucosa, PERRL, EOMI	  Lymphatic: No lymphadenopathy  Cardiovascular: Normal S1 S2, No JVD, +murmurs, No edema  Respiratory: Lungs clear to auscultation	  Psychiatry: A & O x 3, Mood & affect appropriate  Gastrointestinal:  Soft, Non-tender, + BS	  Skin: No rashes, No ecchymoses, No cyanosis	  Neurologic: Non-focal  Extremities: Normal range of motion, No clubbing, cyanosis or edema  Vascular: Peripheral pulses palpable 2+ bilaterally    TELEMETRY: 	    ECG:  	  RADIOLOGY:  OTHER: 	  	  LABS:	 	    CARDIAC MARKERS:                proBNP:   Lipid Profile:   HgA1c:   TSH:       PREVIOUS DIAGNOSTIC TESTING:      < from: 12 Lead ECG (11.21.21 @ 20:09) >  Diagnosis Line ATRIAL FIBRILLATION WITH PREMATURE VENTRICULAR OR ABERRANTLY CONDUCTED COMPLEXES  NONSPECIFIC ST AND T WAVE ABNORMALITY  ABNORMAL ECG  NO PREVIOUS ECGS AVAILABLE    < from: CT Pelvis Bony Only No Cont (11.21.21 @ 16:26) >  Mildly limited by decreased bone mineralization. No displaced fracture.  Degenerative changes as detailed above.    During the night -130's. Pt seen at the bedside. Denies chest pain, palpitations, dyspnea, abdominal or back pain. Pt with Hx of Afib, on Eliquis. No fever, no pain complaints. Pt States " My heart races sometimes". EKG stat. D/w Dr. Dewitt, lopressor 25 mg tab x1, will reevaluate in AM. Monitoring closely.

## 2021-11-24 NOTE — CONSULT NOTE ADULT - ASSESSMENT
SDAT, wanders all day in [facility] refused to walk sio dtr brought her to the ed co acute  pain l hip  denied fall but now , no bruises CT , xr neg stil refuses to ambulates  confabulates , confused at times as usual  pt is a 87 yo female with hx of chronic a.fib on AC s/p fall. ?etiology  check orthosttaic  agree with metioprolol er 25 mg daily for rate control  tsh  physical therapy for gait stability  lipid panel  awaiting MR  chest x ray

## 2021-11-25 ENCOUNTER — TRANSCRIPTION ENCOUNTER (OUTPATIENT)
Age: 86
End: 2021-11-25

## 2021-11-25 PROCEDURE — 71045 X-RAY EXAM CHEST 1 VIEW: CPT | Mod: 26

## 2021-11-25 RX ORDER — ACETAMINOPHEN 500 MG
650 TABLET ORAL EVERY 6 HOURS
Refills: 0 | Status: DISCONTINUED | OUTPATIENT
Start: 2021-11-25 | End: 2021-12-01

## 2021-11-25 RX ADMIN — Medication 1 MILLIGRAM(S): at 21:51

## 2021-11-25 RX ADMIN — Medication 25 MILLIGRAM(S): at 06:05

## 2021-11-25 RX ADMIN — Medication 650 MILLIGRAM(S): at 17:21

## 2021-11-25 RX ADMIN — APIXABAN 2.5 MILLIGRAM(S): 2.5 TABLET, FILM COATED ORAL at 06:05

## 2021-11-25 RX ADMIN — DIVALPROEX SODIUM 500 MILLIGRAM(S): 500 TABLET, DELAYED RELEASE ORAL at 12:02

## 2021-11-25 RX ADMIN — Medication 650 MILLIGRAM(S): at 18:15

## 2021-11-25 RX ADMIN — APIXABAN 2.5 MILLIGRAM(S): 2.5 TABLET, FILM COATED ORAL at 17:21

## 2021-11-25 NOTE — DISCHARGE NOTE PROVIDER - NSDCCPCAREPLAN_GEN_ALL_CORE_FT
PRINCIPAL DISCHARGE DIAGNOSIS  Diagnosis: Left hip pain  Assessment and Plan of Treatment: Seen by Orthopedic  MR of bony Pelvis showed no acute fracture  Physical Therapy for increase mobility and strengthening      SECONDARY DISCHARGE DIAGNOSES  Diagnosis: Dementia  Assessment and Plan of Treatment: Supportive care  Frequent reorientation

## 2021-11-25 NOTE — DISCHARGE NOTE PROVIDER - CARE PROVIDER_API CALL
George Dewitt  [address] [address]  Phone: [phone]  Fax: [phone]  Follow Up Time:

## 2021-11-25 NOTE — DISCHARGE NOTE PROVIDER - DETAILS OF MALNUTRITION DIAGNOSIS/DIAGNOSES
This patient has been assessed with a concern for Malnutrition and was treated during this hospitalization for the following Nutrition diagnosis/diagnoses:     -  12/01/2021: Moderate protein-calorie malnutrition   -  12/01/2021: Underweight (BMI < 19)

## 2021-11-25 NOTE — DISCHARGE NOTE PROVIDER - HOSPITAL COURSE
86 year old female with PMH dementia, A fib, mitral valve repair brought in by EMS from [facility] with L hip pain. Per EMS staff reported no witnessed falls or signs of trauma. Patient states she does not remember falling but started having left back pain radiating to left lateral hip today.    MR bony pelvis showed no acute fracture. Mild BL hip degenerative changes with trace effusions.  PT recomm   86 year old female with PMH dementia, A fib, mitral valve repair brought in by EMS from [facility] with L hip pain. Per EMS staff reported no witnessed falls or signs of trauma. Patient states she does not remember falling but started having left back pain radiating to left lateral hip today.    MR bony pelvis showed no acute fracture. Mild BL hip degenerative changes with trace effusions.  PT recommended Rehab. Pt will return to [place]. 86 year old female with PMH dementia, A fib, mitral valve repair brought in by EMS from [facility] with L hip pain. Per EMS staff reported no witnessed falls or signs of trauma. Patient states she does not remember falling but started having left back pain radiating to left lateral hip today.    MR bony pelvis showed no acute fracture. Mild BL hip degenerative changes with trace effusions.  PT recommended Rehab. Pt will return to [place].   DC wasdelayed by application to [facility] by Dtr tthou PT rec Home to [facility], Insurance did not approve [facility] so pt was dc home to [facility] next day w Home Care and outpt PT  pt will fu c me day after dc at [facility] 86 year old female with PMH dementia, A fib, mitral valve repair brought in by EMS from [facility] with L hip pain. Per EMS staff reported no witnessed falls or signs of trauma. Patient states she does not remember falling but started having left back pain radiating to left lateral hip today.    MR bony pelvis showed no acute fracture. Mild BL hip degenerative changes with trace effusions.  PT recommended Rehab. Pt will return to [place].   DC wasdelayed by application to [facility] by Dtr tthou PT rec Home to [facility], Insurance did not approve [facility] so pt was dc home to [facility] next day w Home Care and outpt PT  pt will fu c me day after dc at [facility]  greater than one hour spent on this encounter and discharge. see also daily progress note day of discharge

## 2021-11-25 NOTE — DISCHARGE NOTE PROVIDER - NSDCMRMEDTOKEN_GEN_ALL_CORE_FT
acetaminophen 500 mg oral tablet: 2 tab(s) orally 3 times a day  divalproex sodium 500 mg oral tablet, extended release: 1 tab(s) orally once a day  Eliquis 2.5 mg oral tablet: 1 tab(s) orally 2 times a day  Multiple Vitamins oral tablet: 1 tab(s) orally once a day   acetaminophen 500 mg oral tablet: 2 tab(s) orally 3 times a day   divalproex sodium 500 mg oral tablet, extended release: 1 tab(s) orally once a day  Eliquis 2.5 mg oral tablet: 1 tab(s) orally 2 times a day  metoprolol succinate 25 mg oral tablet, extended release: 1 tab(s) orally once a day  Multiple Vitamins oral tablet: 1 tab(s) orally once a day

## 2021-11-26 LAB
ANION GAP SERPL CALC-SCNC: 12 MMOL/L — SIGNIFICANT CHANGE UP (ref 5–17)
BUN SERPL-MCNC: 32 MG/DL — HIGH (ref 7–23)
CALCIUM SERPL-MCNC: 9.3 MG/DL — SIGNIFICANT CHANGE UP (ref 8.4–10.5)
CHLORIDE SERPL-SCNC: 102 MMOL/L — SIGNIFICANT CHANGE UP (ref 96–108)
CO2 SERPL-SCNC: 26 MMOL/L — SIGNIFICANT CHANGE UP (ref 22–31)
CREAT SERPL-MCNC: 0.56 MG/DL — SIGNIFICANT CHANGE UP (ref 0.5–1.3)
GLUCOSE SERPL-MCNC: 100 MG/DL — HIGH (ref 70–99)
HCT VFR BLD CALC: 38.2 % — SIGNIFICANT CHANGE UP (ref 34.5–45)
HGB BLD-MCNC: 12.3 G/DL — SIGNIFICANT CHANGE UP (ref 11.5–15.5)
MCHC RBC-ENTMCNC: 31.1 PG — SIGNIFICANT CHANGE UP (ref 27–34)
MCHC RBC-ENTMCNC: 32.2 GM/DL — SIGNIFICANT CHANGE UP (ref 32–36)
MCV RBC AUTO: 96.7 FL — SIGNIFICANT CHANGE UP (ref 80–100)
NRBC # BLD: 0 /100 WBCS — SIGNIFICANT CHANGE UP (ref 0–0)
PLATELET # BLD AUTO: 249 K/UL — SIGNIFICANT CHANGE UP (ref 150–400)
POTASSIUM SERPL-MCNC: 3.9 MMOL/L — SIGNIFICANT CHANGE UP (ref 3.5–5.3)
POTASSIUM SERPL-SCNC: 3.9 MMOL/L — SIGNIFICANT CHANGE UP (ref 3.5–5.3)
RBC # BLD: 3.95 M/UL — SIGNIFICANT CHANGE UP (ref 3.8–5.2)
RBC # FLD: 14.5 % — SIGNIFICANT CHANGE UP (ref 10.3–14.5)
SARS-COV-2 RNA SPEC QL NAA+PROBE: SIGNIFICANT CHANGE UP
SODIUM SERPL-SCNC: 140 MMOL/L — SIGNIFICANT CHANGE UP (ref 135–145)
WBC # BLD: 7.03 K/UL — SIGNIFICANT CHANGE UP (ref 3.8–10.5)
WBC # FLD AUTO: 7.03 K/UL — SIGNIFICANT CHANGE UP (ref 3.8–10.5)

## 2021-11-26 RX ADMIN — Medication 650 MILLIGRAM(S): at 18:24

## 2021-11-26 RX ADMIN — Medication 650 MILLIGRAM(S): at 06:31

## 2021-11-26 RX ADMIN — APIXABAN 2.5 MILLIGRAM(S): 2.5 TABLET, FILM COATED ORAL at 18:24

## 2021-11-26 RX ADMIN — Medication 650 MILLIGRAM(S): at 05:25

## 2021-11-26 RX ADMIN — Medication 650 MILLIGRAM(S): at 13:00

## 2021-11-26 RX ADMIN — Medication 650 MILLIGRAM(S): at 13:50

## 2021-11-26 RX ADMIN — DIVALPROEX SODIUM 500 MILLIGRAM(S): 500 TABLET, DELAYED RELEASE ORAL at 13:00

## 2021-11-26 RX ADMIN — APIXABAN 2.5 MILLIGRAM(S): 2.5 TABLET, FILM COATED ORAL at 05:26

## 2021-11-26 RX ADMIN — Medication 650 MILLIGRAM(S): at 23:42

## 2021-11-26 RX ADMIN — Medication 650 MILLIGRAM(S): at 19:10

## 2021-11-26 RX ADMIN — Medication 1 MILLIGRAM(S): at 20:18

## 2021-11-26 RX ADMIN — Medication 25 MILLIGRAM(S): at 05:26

## 2021-11-27 RX ADMIN — APIXABAN 2.5 MILLIGRAM(S): 2.5 TABLET, FILM COATED ORAL at 18:08

## 2021-11-27 RX ADMIN — Medication 1 MILLIGRAM(S): at 19:52

## 2021-11-27 RX ADMIN — APIXABAN 2.5 MILLIGRAM(S): 2.5 TABLET, FILM COATED ORAL at 05:30

## 2021-11-27 RX ADMIN — Medication 650 MILLIGRAM(S): at 13:22

## 2021-11-27 RX ADMIN — Medication 650 MILLIGRAM(S): at 18:08

## 2021-11-27 RX ADMIN — Medication 25 MILLIGRAM(S): at 05:29

## 2021-11-27 RX ADMIN — Medication 650 MILLIGRAM(S): at 05:30

## 2021-11-27 RX ADMIN — Medication 650 MILLIGRAM(S): at 03:08

## 2021-11-27 RX ADMIN — Medication 650 MILLIGRAM(S): at 18:11

## 2021-11-27 RX ADMIN — Medication 650 MILLIGRAM(S): at 14:40

## 2021-11-27 RX ADMIN — Medication 650 MILLIGRAM(S): at 06:00

## 2021-11-27 RX ADMIN — DIVALPROEX SODIUM 500 MILLIGRAM(S): 500 TABLET, DELAYED RELEASE ORAL at 13:22

## 2021-11-28 LAB — SARS-COV-2 RNA SPEC QL NAA+PROBE: SIGNIFICANT CHANGE UP

## 2021-11-28 RX ADMIN — APIXABAN 2.5 MILLIGRAM(S): 2.5 TABLET, FILM COATED ORAL at 05:46

## 2021-11-28 RX ADMIN — DIVALPROEX SODIUM 500 MILLIGRAM(S): 500 TABLET, DELAYED RELEASE ORAL at 14:23

## 2021-11-28 RX ADMIN — APIXABAN 2.5 MILLIGRAM(S): 2.5 TABLET, FILM COATED ORAL at 18:23

## 2021-11-28 RX ADMIN — Medication 650 MILLIGRAM(S): at 05:46

## 2021-11-28 RX ADMIN — Medication 25 MILLIGRAM(S): at 05:45

## 2021-11-28 RX ADMIN — Medication 650 MILLIGRAM(S): at 18:23

## 2021-11-28 RX ADMIN — Medication 1 MILLIGRAM(S): at 20:25

## 2021-11-28 RX ADMIN — Medication 650 MILLIGRAM(S): at 07:20

## 2021-11-28 RX ADMIN — Medication 650 MILLIGRAM(S): at 14:22

## 2021-11-29 LAB
ANION GAP SERPL CALC-SCNC: 11 MMOL/L — SIGNIFICANT CHANGE UP (ref 5–17)
BUN SERPL-MCNC: 30 MG/DL — HIGH (ref 7–23)
CALCIUM SERPL-MCNC: 9.4 MG/DL — SIGNIFICANT CHANGE UP (ref 8.4–10.5)
CHLORIDE SERPL-SCNC: 103 MMOL/L — SIGNIFICANT CHANGE UP (ref 96–108)
CO2 SERPL-SCNC: 25 MMOL/L — SIGNIFICANT CHANGE UP (ref 22–31)
CREAT SERPL-MCNC: 0.53 MG/DL — SIGNIFICANT CHANGE UP (ref 0.5–1.3)
GLUCOSE SERPL-MCNC: 90 MG/DL — SIGNIFICANT CHANGE UP (ref 70–99)
HCT VFR BLD CALC: 38.8 % — SIGNIFICANT CHANGE UP (ref 34.5–45)
HGB BLD-MCNC: 12.2 G/DL — SIGNIFICANT CHANGE UP (ref 11.5–15.5)
MAGNESIUM SERPL-MCNC: 2.1 MG/DL — SIGNIFICANT CHANGE UP (ref 1.6–2.6)
MCHC RBC-ENTMCNC: 31 PG — SIGNIFICANT CHANGE UP (ref 27–34)
MCHC RBC-ENTMCNC: 31.4 GM/DL — LOW (ref 32–36)
MCV RBC AUTO: 98.7 FL — SIGNIFICANT CHANGE UP (ref 80–100)
NRBC # BLD: 0 /100 WBCS — SIGNIFICANT CHANGE UP (ref 0–0)
PHOSPHATE SERPL-MCNC: 3 MG/DL — SIGNIFICANT CHANGE UP (ref 2.5–4.5)
PLATELET # BLD AUTO: 246 K/UL — SIGNIFICANT CHANGE UP (ref 150–400)
POTASSIUM SERPL-MCNC: 3.9 MMOL/L — SIGNIFICANT CHANGE UP (ref 3.5–5.3)
POTASSIUM SERPL-SCNC: 3.9 MMOL/L — SIGNIFICANT CHANGE UP (ref 3.5–5.3)
RBC # BLD: 3.93 M/UL — SIGNIFICANT CHANGE UP (ref 3.8–5.2)
RBC # FLD: 14.4 % — SIGNIFICANT CHANGE UP (ref 10.3–14.5)
SODIUM SERPL-SCNC: 139 MMOL/L — SIGNIFICANT CHANGE UP (ref 135–145)
WBC # BLD: 5.9 K/UL — SIGNIFICANT CHANGE UP (ref 3.8–10.5)
WBC # FLD AUTO: 5.9 K/UL — SIGNIFICANT CHANGE UP (ref 3.8–10.5)

## 2021-11-29 RX ADMIN — APIXABAN 2.5 MILLIGRAM(S): 2.5 TABLET, FILM COATED ORAL at 17:12

## 2021-11-29 RX ADMIN — Medication 650 MILLIGRAM(S): at 11:36

## 2021-11-29 RX ADMIN — DIVALPROEX SODIUM 500 MILLIGRAM(S): 500 TABLET, DELAYED RELEASE ORAL at 11:36

## 2021-11-29 RX ADMIN — Medication 650 MILLIGRAM(S): at 12:20

## 2021-11-29 RX ADMIN — Medication 25 MILLIGRAM(S): at 05:28

## 2021-11-29 RX ADMIN — APIXABAN 2.5 MILLIGRAM(S): 2.5 TABLET, FILM COATED ORAL at 05:28

## 2021-11-29 RX ADMIN — Medication 650 MILLIGRAM(S): at 05:28

## 2021-11-29 RX ADMIN — Medication 650 MILLIGRAM(S): at 17:12

## 2021-11-29 RX ADMIN — Medication 650 MILLIGRAM(S): at 05:58

## 2021-11-29 RX ADMIN — Medication 650 MILLIGRAM(S): at 17:48

## 2021-11-30 LAB — SARS-COV-2 RNA SPEC QL NAA+PROBE: SIGNIFICANT CHANGE UP

## 2021-11-30 RX ORDER — ACETAMINOPHEN 500 MG
2 TABLET ORAL
Qty: 0 | Refills: 0 | DISCHARGE

## 2021-11-30 RX ORDER — METOPROLOL TARTRATE 50 MG
1 TABLET ORAL
Qty: 30 | Refills: 0
Start: 2021-11-30 | End: 2021-12-29

## 2021-11-30 RX ORDER — APIXABAN 2.5 MG/1
1 TABLET, FILM COATED ORAL
Qty: 0 | Refills: 0 | DISCHARGE

## 2021-11-30 RX ORDER — DIVALPROEX SODIUM 500 MG/1
1 TABLET, DELAYED RELEASE ORAL
Qty: 30 | Refills: 0
Start: 2021-11-30 | End: 2021-12-29

## 2021-11-30 RX ORDER — DIVALPROEX SODIUM 500 MG/1
1 TABLET, DELAYED RELEASE ORAL
Qty: 0 | Refills: 0 | DISCHARGE

## 2021-11-30 RX ORDER — ACETAMINOPHEN 500 MG
2 TABLET ORAL
Qty: 60 | Refills: 0
Start: 2021-11-30 | End: 2021-12-09

## 2021-11-30 RX ORDER — APIXABAN 2.5 MG/1
1 TABLET, FILM COATED ORAL
Qty: 60 | Refills: 0
Start: 2021-11-30 | End: 2021-12-29

## 2021-11-30 RX ADMIN — APIXABAN 2.5 MILLIGRAM(S): 2.5 TABLET, FILM COATED ORAL at 06:14

## 2021-11-30 RX ADMIN — Medication 650 MILLIGRAM(S): at 18:49

## 2021-11-30 RX ADMIN — Medication 650 MILLIGRAM(S): at 23:14

## 2021-11-30 RX ADMIN — APIXABAN 2.5 MILLIGRAM(S): 2.5 TABLET, FILM COATED ORAL at 17:35

## 2021-11-30 RX ADMIN — Medication 25 MILLIGRAM(S): at 06:14

## 2021-11-30 RX ADMIN — Medication 650 MILLIGRAM(S): at 13:50

## 2021-11-30 RX ADMIN — Medication 650 MILLIGRAM(S): at 12:50

## 2021-11-30 RX ADMIN — Medication 650 MILLIGRAM(S): at 17:35

## 2021-11-30 RX ADMIN — DIVALPROEX SODIUM 500 MILLIGRAM(S): 500 TABLET, DELAYED RELEASE ORAL at 12:49

## 2021-11-30 RX ADMIN — Medication 650 MILLIGRAM(S): at 06:13

## 2021-12-01 ENCOUNTER — TRANSCRIPTION ENCOUNTER (OUTPATIENT)
Age: 86
End: 2021-12-01

## 2021-12-01 VITALS
RESPIRATION RATE: 17 BRPM | HEART RATE: 86 BPM | TEMPERATURE: 99 F | OXYGEN SATURATION: 97 % | SYSTOLIC BLOOD PRESSURE: 128 MMHG | DIASTOLIC BLOOD PRESSURE: 71 MMHG

## 2021-12-01 PROCEDURE — 36415 COLL VENOUS BLD VENIPUNCTURE: CPT

## 2021-12-01 PROCEDURE — 84100 ASSAY OF PHOSPHORUS: CPT

## 2021-12-01 PROCEDURE — U0005: CPT

## 2021-12-01 PROCEDURE — 97530 THERAPEUTIC ACTIVITIES: CPT

## 2021-12-01 PROCEDURE — 76377 3D RENDER W/INTRP POSTPROCES: CPT

## 2021-12-01 PROCEDURE — 71045 X-RAY EXAM CHEST 1 VIEW: CPT

## 2021-12-01 PROCEDURE — U0003: CPT

## 2021-12-01 PROCEDURE — 97110 THERAPEUTIC EXERCISES: CPT

## 2021-12-01 PROCEDURE — 80048 BASIC METABOLIC PNL TOTAL CA: CPT

## 2021-12-01 PROCEDURE — 85652 RBC SED RATE AUTOMATED: CPT

## 2021-12-01 PROCEDURE — 99284 EMERGENCY DEPT VISIT MOD MDM: CPT | Mod: 25

## 2021-12-01 PROCEDURE — 80053 COMPREHEN METABOLIC PANEL: CPT

## 2021-12-01 PROCEDURE — 86140 C-REACTIVE PROTEIN: CPT

## 2021-12-01 PROCEDURE — 83735 ASSAY OF MAGNESIUM: CPT

## 2021-12-01 PROCEDURE — 97161 PT EVAL LOW COMPLEX 20 MIN: CPT

## 2021-12-01 PROCEDURE — 97116 GAIT TRAINING THERAPY: CPT

## 2021-12-01 PROCEDURE — 72195 MRI PELVIS W/O DYE: CPT

## 2021-12-01 PROCEDURE — 85027 COMPLETE CBC AUTOMATED: CPT

## 2021-12-01 PROCEDURE — 96374 THER/PROPH/DIAG INJ IV PUSH: CPT

## 2021-12-01 PROCEDURE — 85025 COMPLETE CBC W/AUTO DIFF WBC: CPT

## 2021-12-01 PROCEDURE — 72192 CT PELVIS W/O DYE: CPT | Mod: MA

## 2021-12-01 PROCEDURE — 73502 X-RAY EXAM HIP UNI 2-3 VIEWS: CPT

## 2021-12-01 PROCEDURE — 93005 ELECTROCARDIOGRAM TRACING: CPT

## 2021-12-01 PROCEDURE — 86769 SARS-COV-2 COVID-19 ANTIBODY: CPT

## 2021-12-01 RX ADMIN — Medication 650 MILLIGRAM(S): at 05:07

## 2021-12-01 RX ADMIN — APIXABAN 2.5 MILLIGRAM(S): 2.5 TABLET, FILM COATED ORAL at 05:06

## 2021-12-01 RX ADMIN — Medication 25 MILLIGRAM(S): at 05:07

## 2021-12-01 RX ADMIN — Medication 650 MILLIGRAM(S): at 06:10

## 2021-12-01 RX ADMIN — Medication 650 MILLIGRAM(S): at 00:00

## 2021-12-01 NOTE — PROGRESS NOTE ADULT - SUBJECTIVE AND OBJECTIVE BOX
CARDIOLOGY     PROGRESS  NOTE   ________________________________________________    CHIEF COMPLAINT:Patient is a 86y old  Female who presents with a chief [facility] at first denied fall but now admits she fell , hX dementia memory poor but us[facility]rewNov[facility] Centerg[facility] alland wanders (25 Nov 2021 18:30)  no complain.  	  REVIEW OF SYSTEMS:  CONSTITUTIONAL: No fever, weight loss, or fatigue  EYES: No eye pain, visual disturbances, or discharge  ENT:  No difficulty hearing, tinnitus, vertigo; No sinus or throat pain  NECK: No pain or stiffness  RESPIRATORY: No cough, wheezing, chills or hemoptysis; No Shortness of Breath  CARDIOVASCULAR: No chest pain, palpitations, passing out, dizziness, or leg swelling  GASTROINTESTINAL: No abdominal or epigastric pain. No nausea, vomiting, or hematemesis; No diarrhea or constipation. No melena or hematochezia.  GENITOURINARY: No dysuria, frequency, hematuria, or incontinence  NEUROLOGICAL: No headaches, memory loss, loss of strength, numbness, or tremors  SKIN: No itching, burning, rashes, or lesions   LYMPH Nodes: No enlarged glands  ENDOCRINE: No heat or cold intolerance; No hair loss  MUSCULOSKELETAL: No joint pain or swelling; No muscle, back, or extremity pain  PSYCHIATRIC: No depression, anxiety, mood swings, or difficulty sleeping  HEME/LYMPH: No easy bruising, or bleeding gums  ALLERGY AND IMMUNOLOGIC: No hives or eczema	    [ ] All others negative	  [ ] Unable to obtain    PHYSICAL EXAM:  T(C): 36.4 (11-26-21 @ 05:23), Max: 37.2 (11-25-21 @ 13:20)  HR: 70 (11-26-21 @ 05:23) (70 - 89)  BP: 109/65 (11-26-21 @ 05:23) (98/53 - 109/65)  RR: 16 (11-26-21 @ 05:23) (16 - 18)  SpO2: 97% (11-26-21 @ 05:23) (94% - 97%)  Wt(kg): --  I&O's Summary    25 Nov 2021 07:01  -  26 Nov 2021 07:00  --------------------------------------------------------  IN: 750 mL / OUT: 0 mL / NET: 750 mL        Appearance: Normal	  HEENT:   Normal oral mucosa, PERRL, EOMI	  Lymphatic: No lymphadenopathy  Cardiovascular: Normal S1 S2, No JVD, + murmurs, No edema  Respiratory: Lungs clear to auscultation	  Psychiatry: A & O x 3, Mood & affect appropriate  Gastrointestinal:  Soft, Non-tender, + BS	  Skin: No rashes, No ecchymoses, No cyanosis	  Neurologic: Non-focal  Extremities: Normal range of motion, No clubbing, cyanosis or edema  Vascular: Peripheral pulses palpable 2+ bilaterally    MEDICATIONS  (STANDING):  acetaminophen     Tablet .. 650 milliGRAM(s) Oral every 6 hours  apixaban 2.5 milliGRAM(s) Oral two times a day  diVALproex  milliGRAM(s) Oral daily  metoprolol succinate ER 25 milliGRAM(s) Oral daily      TELEMETRY: 	    ECG:  	  RADIOLOGY:  OTHER: 	  	  LABS:	 	    CARDIAC MARKERS:                                12.3   7.03  )-----------( 249      ( 26 Nov 2021 07:12 )             38.2     11-26    140  |  102  |  32<H>  ----------------------------<  100<H>  3.9   |  26  |  0.56    Ca    9.3      26 Nov 2021 07:11      proBNP:   Lipid Profile:   HgA1c:   TSH:   < from: MR Pelvis Bony Only No Cont (11.24.21 @ 22:36) >  1.  No acute fracture.    2.  Chronic mild anterior wedging at L4. Lower lumbar spine spondylosis as above.    3.  Mild bilateral hip degenerative changes with trace effusions.    < end of copied text >        Assessment and plan  ---------------------------  SDAT, wanrosa isela all day in [place] refused to walk sio dtr brought her to the ed co acute  pain l hip  denied fall but now , no bruises CT , xr neg stil refuses to ambulates  confabulates , confused at times as usual  pt is a 85 yo female with hx of chronic a.fib on AC s/p fall. ?etiology  check orthosttaic  agree with metioprolol er 25 mg daily for rate control  tsh  physical therapy for gait stability  lipid panel  hip MR noted  chest x ray ordered  encourage po fluid  not orthostatic  no objection to dc cardiac wise    	        
           CARDIOLOGY     PROGRESS  NOTE   ________________________________________________    CHIEF COMPLAINT:Patient is a 86y old  Female who presents with a chief complaint of refused to ambulate co pain L hip at first denied fall but now admits she fell , hX dementia memory poor but usu ambulates and wanders (28 Nov 2021 12:02)  no complain.  	  REVIEW OF SYSTEMS:  CONSTITUTIONAL: No fever, weight loss, or fatigue  EYES: No eye pain, visual disturbances, or discharge  ENT:  No difficulty hearing, tinnitus, vertigo; No sinus or throat pain  NECK: No pain or stiffness  RESPIRATORY: No cough, wheezing, chills or hemoptysis; No Shortness of Breath  CARDIOVASCULAR: No chest pain, palpitations, passing out, dizziness, or leg swelling  GASTROINTESTINAL: No abdominal or epigastric pain. No nausea, vomiting, or hematemesis; No diarrhea or constipation. No melena or hematochezia.  GENITOURINARY: No dysuria, frequency, hematuria, or incontinence  NEUROLOGICAL: No headaches, memory loss, loss of strength, numbness, or tremors  SKIN: No itching, burning, rashes, or lesions   LYMPH Nodes: No enlarged glands  ENDOCRINE: No heat or cold intolerance; No hair loss  MUSCULOSKELETAL: No joint pain or swelling; No muscle, back, or extremity pain  PSYCHIATRIC: No depression, anxiety, mood swings, or difficulty sleeping  HEME/LYMPH: No easy bruising, or bleeding gums  ALLERGY AND IMMUNOLOGIC: No hives or eczema	    [ ] All others negative	  [ ] Unable to obtain    PHYSICAL EXAM:  T(C): 36.5 (11-29-21 @ 05:26), Max: 37.1 (11-28-21 @ 19:53)  HR: 78 (11-29-21 @ 05:26) (61 - 88)  BP: 125/68 (11-29-21 @ 05:26) (112/63 - 125/68)  RR: 18 (11-29-21 @ 05:26) (18 - 18)  SpO2: 97% (11-29-21 @ 05:26) (96% - 98%)  Wt(kg): --  I&O's Summary    28 Nov 2021 07:01  -  29 Nov 2021 07:00  --------------------------------------------------------  IN: 760 mL / OUT: 0 mL / NET: 760 mL        Appearance: Normal	  HEENT:   Normal oral mucosa, PERRL, EOMI	  Lymphatic: No lymphadenopathy  Cardiovascular: Normal S1 S2, No JVD, + murmurs, No edema  Respiratory: Lungs clear to auscultation	  Psychiatry: A & O x 3, Mood & affect appropriate  Gastrointestinal:  Soft, Non-tender, + BS	  Skin: No rashes, No ecchymoses, No cyanosis	  Neurologic: Non-focal  Extremities: Normal range of motion, No clubbing, cyanosis or edema  Vascular: Peripheral pulses palpable 2+ bilaterally    MEDICATIONS  (STANDING):  acetaminophen     Tablet .. 650 milliGRAM(s) Oral every 6 hours  apixaban 2.5 milliGRAM(s) Oral two times a day  diVALproex  milliGRAM(s) Oral daily  metoprolol succinate ER 25 milliGRAM(s) Oral daily      TELEMETRY: 	    ECG:  	  RADIOLOGY:  OTHER: 	  	  LABS:	 	    CARDIAC MARKERS:                                12.2   5.90  )-----------( 246      ( 29 Nov 2021 07:21 )             38.8     11-29    139  |  103  |  30<H>  ----------------------------<  90  3.9   |  25  |  0.53    Ca    9.4      29 Nov 2021 07:22  Phos  3.0     11-29  Mg     2.1     11-29      proBNP:   Lipid Profile:   HgA1c:   TSH:         Assessment and plan  ---------------------------  SDAT, [place] all day in [place] refused to walk sio dtr brought her to the ed co acute  pain l hip  denied fall but now , no bruises CT , xr neg stil refuses to ambulates  confabulates , confused at times as usual  pt is a 85 yo female with hx of chronic a.fib on AC s/p fall. ?etiology  check orthosttaic  agree with metioprolol er 25 mg daily for rate control  tsh  physical therapy for gaith stability  lipid panel  hip MR noted  chest x ray ordered  encourage po fluid  not orthostatic  no objection to dc cardiac wise  dvt prophylaxis  physical therapy, increase ambulation  oob to chair  dvt prophylaxis  awaiting rehab  increase po fluid intake    	        
DATE OF SERVICE: 11-25-21 @ 09:16    HPI:  SDAT, bimal all day in [place] refused to walk sio dtr brought her to the ed co acute  pain l hip  denied fall but now ad mits she fell, no bruises CT , xr neg   stil refuses to ambulates  confabulates , confused at times as usual   (22 Nov 2021 12:42)      Interval Events  MRI done last nite, no report yet  alert nad demented st memory very poor  PAF not rapid  see cardio, will have to decide about ac, after PT eval and dc plan    MEDICATIONS  (STANDING):  apixaban 2.5 milliGRAM(s) Oral two times a day  diVALproex  milliGRAM(s) Oral daily  metoprolol succinate ER 25 milliGRAM(s) Oral daily    MEDICATIONS  (PRN):      Patient is a 86y old  Female who presents with a chief complaint of refused to ambulate co pain L hip at first denied fall but now admits she fell , hX dementia memory poor but usu andrewdevanguy alland wanders (24 Nov 2021 10:22)      REVIEW OF SYSTEMS    General:nad denies pain ex l hip Sometimes  Skin/Breast:  Ophthalmologic:no co no ch  ENMT:	no co no ch  Respiratory and Thorax:no cough no sp no sob  Cardiovascular:no cp npo palp  Gastrointestinal:no nvcd  Genitourinary:no fdi  Musculoskeletal:	l hip pain c wb  Neurological:	noco no ch  Psychiatric:	  Hematology/Lymphatics:	  Endocrine:	no polyudd  Allergic/Immunologic:  AOSN	y slept ateetc      Vital Signs Last 24 Hrs  T(C): 36.9 (25 Nov 2021 06:01), Max: 37.1 (24 Nov 2021 12:23)  T(F): 98.4 (25 Nov 2021 06:01), Max: 98.8 (24 Nov 2021 12:23)  HR: 89 (25 Nov 2021 06:01) (84 - 89)  BP: 104/65 (25 Nov 2021 06:01) (104/65 - 120/62)  BP(mean): --  RR: 16 (25 Nov 2021 06:01) (16 - 19)  SpO2: 94% (25 Nov 2021 06:01) (94% - 95%)    PHYSICAL EXAM:    Constitutional:nad vss  H+N ncat  Eyes:natalie cwnl  ENMT:hears speaks swallows ok  Neck:nocb notm  Breasts:  Back:  Respiratory:ctab no rrw  Cardiovascular:irre ireg distant  Gastrointestinal:soft nt no g  Genitourinary:  Rectal:  Extremities:no cce  Vascular:hm- vv-  Neurological:memory quite poor even immed st  Skin:cdi no bruise  Lymph Nodes:  Musculoskeletal:good rom  no other f  Psychiatric:    LABS                  Imaging:    Xray:    Echo:    CT:    MRI:    Tele:    Orders:    BRUNO Dewitt [phone]
DATE OF SERVICE: 11-26-21 @ 09:44    HPI:  SDAT, [place] all day in [place] refused to walk sio dtr brought her to the ed co acute  pain l hip  denied fall but now ad mits she fell, no bruises CT , xr neg   stil refuses to ambulates  confabulates , confused at times as usual   (22 Nov 2021 12:42)      Interval Events      MEDICATIONS  (STANDING):  acetaminophen     Tablet .. 650 milliGRAM(s) Oral every 6 hours  apixaban 2.5 milliGRAM(s) Oral two times a day  diVALproex  milliGRAM(s) Oral daily  metoprolol succinate ER 25 milliGRAM(s) Oral daily    MEDICATIONS  (PRN):      Patient is a 86y old  Female who presents with a chief complaint of refused to ambuklate co pain L hip at first denied fall but now admits she fell , hX dementia memory poor but usu ambukates alland wanders (26 Nov 2021 08:18)      REVIEW OF SYSTEMS    General:nad no co  Skin/Breast:  Ophthalmologic:no co no ch  ENMT:	no co no ch  Respiratory and Thorax:no cough no sp no sob  Cardiovascular:nop cp no palp  Gastrointestinal:no nvcd  Genitourinary:no fdi  Musculoskeletal:	no a no pa now  Neurological:	knows her memory is very poor  Psychiatric:	  Hematology/Lymphatics:	  Endocrine:	no poly udd  Allergic/Immunologic:  AOSN	y      Vital Signs Last 24 Hrs  T(C): 36.4 (26 Nov 2021 05:23), Max: 37.2 (25 Nov 2021 13:20)  T(F): 97.6 (26 Nov 2021 05:23), Max: 98.9 (25 Nov 2021 13:20)  HR: 70 (26 Nov 2021 05:23) (70 - 89)  BP: 109/65 (26 Nov 2021 05:23) (98/53 - 109/65)  BP(mean): --  RR: 16 (26 Nov 2021 05:23) (16 - 18)  SpO2: 97% (26 Nov 2021 05:23) (94% - 97%)    PHYSICAL EXAM:    Constitutional:vss nad no [pain now  H+N ncat  Eyes:natalie cwnl  ENMT:hears swakllows speaks cl;early  Neck:no cb no tm  Breasts:  Back:  Respiratory:ctrab no rrw  Cardiovascular:rrr nopw m  Gastrointestinal:soft nt  Genitourinary:  Rectal:  Extremities:no cce  Vascular:vv- hm-  Neurological:memv poor, nfatmae  Skin:cdi  Lymph Nodes:  Musculoskeletal:  Psychiatric:xcalm coop    LABS  CBC Full  -  ( 26 Nov 2021 07:12 )  WBC Count : 7.03 K/uL  RBC Count : 3.95 M/uL  Hemoglobin : 12.3 g/dL  Hematocrit : 38.2 %  Platelet Count - Automated : 249 K/uL  Mean Cell Volume : 96.7 fl  Mean Cell Hemoglobin : 31.1 pg  Mean Cell Hemoglobin Concentration : 32.2 gm/dL  Auto Neutrophil # : x  Auto Lymphocyte # : x  Auto Monocyte # : x  Auto Eosinophil # : x  Auto Basophil # : x  Auto Neutrophil % : x  Auto Lymphocyte % : x  Auto Monocyte % : x  Auto Eosinophil % : x  Auto Basophil % : x      11-26    140  |  102  |  32<H>  ----------------------------<  100<H>  3.9   |  26  |  0.56    Ca    9.3      26 Nov 2021 07:11            Imaging:    Xray:    Echo:    CT:    MRI: ng Fx    Tele:    Orders:    BRUNO Dewitt [phone]
           CARDIOLOGY     PROGRESS  NOTE   ________________________________________________    CHIEF COMPLAINT:Patient is a 86y old  Female who presents with a chief [facility] at first denied fall but now admits she fell , hX dementia memory poor but usu aurora alland wanders (26 Nov 2021 09:43)  no complain.  	  REVIEW OF SYSTEMS:  CONSTITUTIONAL: No fever, weight loss, or fatigue  EYES: No eye pain, visual disturbances, or discharge  ENT:  No difficulty hearing, tinnitus, vertigo; No sinus or throat pain  NECK: No pain or stiffness  RESPIRATORY: No cough, wheezing, chills or hemoptysis; No Shortness of Breath  CARDIOVASCULAR: No chest pain, palpitations, passing out, dizziness, or leg swelling  GASTROINTESTINAL: No abdominal or epigastric pain. No nausea, vomiting, or hematemesis; No diarrhea or constipation. No melena or hematochezia.  GENITOURINARY: No dysuria, frequency, hematuria, or incontinence  NEUROLOGICAL: No headaches, memory loss, loss of strength, numbness, or tremors  SKIN: No itching, burning, rashes, or lesions   LYMPH Nodes: No enlarged glands  ENDOCRINE: No heat or cold intolerance; No hair loss  MUSCULOSKELETAL: No joint pain or swelling; No muscle, back, or extremity pain  PSYCHIATRIC: No depression, anxiety, mood swings, or difficulty sleeping  HEME/LYMPH: No easy bruising, or bleeding gums  ALLERGY AND IMMUNOLOGIC: No hives or eczema	    [ ] All others negative	  [ ] Unable to obtain    PHYSICAL EXAM:  T(C): 36.4 (11-27-21 @ 08:36), Max: 37.1 (11-26-21 @ 13:36)  HR: 59 (11-27-21 @ 08:36) (59 - 84)  BP: 107/68 (11-27-21 @ 08:36) (107/58 - 115/73)  RR: 18 (11-27-21 @ 08:36) (16 - 18)  SpO2: 98% (11-27-21 @ 08:36) (94% - 98%)  Wt(kg): --  I&O's Summary    26 Nov 2021 07:01  -  27 Nov 2021 07:00  --------------------------------------------------------  IN: 1160 mL / OUT: 0 mL / NET: 1160 mL        Appearance: Normal	  HEENT:   Normal oral mucosa, PERRL, EOMI	  Lymphatic: No lymphadenopathy  Cardiovascular: Normal S1 S2, No JVD, + murmurs, No edema  Respiratory: rhonchi  Psychiatry: A & O x 3, Mood & affect appropriate  Gastrointestinal:  Soft, Non-tender, + BS	  Skin: No rashes, No ecchymoses, No cyanosis	  Neurologic: Non-focal  Extremities: Normal range of motion, No clubbing, cyanosis or edema  Vascular: Peripheral pulses palpable 2+ bilaterally    MEDICATIONS  (STANDING):  acetaminophen     Tablet .. 650 milliGRAM(s) Oral every 6 hours  apixaban 2.5 milliGRAM(s) Oral two times a day  diVALproex  milliGRAM(s) Oral daily  metoprolol succinate ER 25 milliGRAM(s) Oral daily      TELEMETRY: 	    ECG:  	  RADIOLOGY:  OTHER: 	  	  LABS:	 	    CARDIAC MARKERS:                                12.3   7.03  )-----------( 249      ( 26 Nov 2021 07:12 )             38.2     11-26    140  |  102  |  32<H>  ----------------------------<  100<H>  3.9   |  26  |  0.56    Ca    9.3      26 Nov 2021 07:11      proBNP:   Lipid Profile:   HgA1c:   TSH:       < from: MR Pelvis Bony Only No Cont (11.24.21 @ 22:36) >  1.  No acute fracture.    2.  Chronic mild anterior wedging at L4. Lower lumbar spine spondylosis as above.    3.  Mild bilateral hip degenerative changes with trace effusions.      Assessment and plan  ---------------------------  SDAT, [place] all day in [place] refused to walk sio dtr brought her to the ed co acute  pain l hip  denied fall but now , no bruises CT , xr neg stil refuses to ambulates  confabulates , confused at times as usual  pt is a 85 yo female with hx of chronic a.fib on AC s/p fall. ?etiology  check orthosttaic  agree with metioprolol er 25 mg daily for rate control  tsh  physical therapy for gaith stability  lipid panel  hip MR noted  chest x ray ordered  encourage po fluid  not orthostatic  no objection to dc cardiac wise  dvt prophylaxis    	        
DATE OF SERVICE: 12-01-21 @ 09:31    HPI:  bimal KHANNA all day in [place] refused to walk sio dtr brought her to the ed co acute  pain l hip  denied fall but now ad mits she fell, no bruises CT , xr neg   stil refuses to ambulates  confabulates , confused at times as usual   (22 Nov 2021 12:42)      Interval Events  insurance rejected adm to [facility] so pt will rtn to [facility] today, can get home care and PT at home  no new co this am , confused this am about Derek waggoner term mem quite poor - not new    MEDICATIONS  (STANDING):  acetaminophen     Tablet .. 650 milliGRAM(s) Oral every 6 hours  apixaban 2.5 milliGRAM(s) Oral two times a day  diVALproex  milliGRAM(s) Oral daily  metoprolol succinate ER 25 milliGRAM(s) Oral daily    MEDICATIONS  (PRN):      Patient is a 86y old  Female who presents with a chief complaint of refused to ambuklate co pain L hip at first denied fall but now admits she fell , hX dementia memory poor but usu andrewchrista allFormerly [facility] (01 Dec 2021 08:41)      REVIEW OF SYSTEMS    General:nad no change  Skin/Breast:  Ophthalmologic:no co no ch sees ok  ENMT:	hears swallows eats speaks ok  Respiratory and Thorax:no cough no sp no sob  Cardiovascular:no cp no palp  Gastrointestinal:no nvcd  Genitourinary:no fdi  Musculoskeletal:	no a no pain this am  not co and denies pain  Neurological:	no co no ch  Psychiatric:	  Hematology/Lymphatics:	  Endocrine:	no polyudd  Allergic/Immunologic:  AOSN	y      Vital Signs Last 24 Hrs  T(C): 36.7 (01 Dec 2021 05:04), Max: 37 (30 Nov 2021 20:04)  T(F): 98.1 (01 Dec 2021 05:04), Max: 98.6 (30 Nov 2021 20:04)  HR: 73 (01 Dec 2021 05:04) (60 - 73)  BP: 118/62 (01 Dec 2021 05:04) (117/60 - 133/72)  BP(mean): --  RR: 18 (01 Dec 2021 05:04) (18 - 18)  SpO2: 96% (01 Dec 2021 05:04) (95% - 96%)    PHYSICAL EXAM:    Constitutional:vss nad   H+Nnc at  Eyes:natalie cwnl  ENMT:hears swallows  speaks clearly  Neck:no cb no tm  Breasts:  Back:  Respiratory:ctab norrw  Cardiovascular:irreg irreg m  Gastrointestinal:soft nt  Genitourinary:  Rectal:  Extremities:no cce  Vascular:hm- vv-  Neurological:nfatmae in bed, no c  Skin:cdi, b&b rt arm(2/2 iv)  Lymph Nodes:  Musculoskeletal:good rom, nt  Psychiatric:    LABS                  Imaging:    Xray:    Echo:    CT:    MRI:    Tele:    Orders:    BRUNO Dewitt [phone]
           CARDIOLOGY     PROGRESS  NOTE   ________________________________________________    CHIEF COMPLAINT:Patient is a 86y old  Female who presents with a chief [facility] at first denied fall but now admits she fell , hX dementia memory poor but usu aurora alland wanders (25 Nov 2021 09:16)  no complain.  	  REVIEW OF SYSTEMS:  CONSTITUTIONAL: No fever, weight loss, or fatigue  EYES: No eye pain, visual disturbances, or discharge  ENT:  No difficulty hearing, tinnitus, vertigo; No sinus or throat pain  NECK: No pain or stiffness  RESPIRATORY: No cough, wheezing, chills or hemoptysis; No Shortness of Breath  CARDIOVASCULAR: No chest pain, palpitations, passing out, dizziness, or leg swelling  GASTROINTESTINAL: No abdominal or epigastric pain. No nausea, vomiting, or hematemesis; No diarrhea or constipation. No melena or hematochezia.  GENITOURINARY: No dysuria, frequency, hematuria, or incontinence  NEUROLOGICAL: No headaches, memory loss, loss of strength, numbness, or tremors  SKIN: No itching, burning, rashes, or lesions   LYMPH Nodes: No enlarged glands  ENDOCRINE: No heat or cold intolerance; No hair loss  MUSCULOSKELETAL: No joint pain or swelling; No muscle, back, or extremity pain  PSYCHIATRIC: No depression, anxiety, mood swings, or difficulty sleeping  HEME/LYMPH: No easy bruising, or bleeding gums  ALLERGY AND IMMUNOLOGIC: No hives or eczema	    [ ] All others negative	  [ ] Unable to obtain    PHYSICAL EXAM:  T(C): 36.9 (11-25-21 @ 06:01), Max: 37.1 (11-24-21 @ 12:23)  HR: 89 (11-25-21 @ 06:01) (84 - 89)  BP: 104/65 (11-25-21 @ 06:01) (104/65 - 120/62)  RR: 16 (11-25-21 @ 06:01) (16 - 19)  SpO2: 94% (11-25-21 @ 06:01) (94% - 95%)  Wt(kg): --  I&O's Summary    24 Nov 2021 07:01  -  25 Nov 2021 07:00  --------------------------------------------------------  IN: 720 mL / OUT: 0 mL / NET: 720 mL        Appearance: Normal	  HEENT:   Normal oral mucosa, PERRL, EOMI	  Lymphatic: No lymphadenopathy  Cardiovascular: Normal S1 S2, No JVD, + murmurs, No edema  Respiratory: rhonchi  Psychiatry: A & O x 3, Mood & affect appropriate  Gastrointestinal:  Soft, Non-tender, + BS	  Skin: No rashes, No ecchymoses, No cyanosis	  Neurologic: Non-focal  Extremities: Normal range of motion, No clubbing, cyanosis or edema  Vascular: Peripheral pulses palpable 2+ bilaterally    MEDICATIONS  (STANDING):  apixaban 2.5 milliGRAM(s) Oral two times a day  diVALproex  milliGRAM(s) Oral daily  metoprolol succinate ER 25 milliGRAM(s) Oral daily      TELEMETRY: 	    ECG:  	  RADIOLOGY:  OTHER: 	  	  LABS:	 	    CARDIAC MARKERS:      proBNP:   Lipid Profile:   HgA1c:   TSH:         Assessment and plan  ---------------------------  SDAT, wanders all day in [place] refused to walk sio dtr brought her to the ed co acute  pain l hip  denied fall but now , no bruises CT , xr neg stil refuses to ambulates  confabulates , confused at times as usual  pt is a 85 yo female with hx of chronic a.fib on AC s/p fall. ?etiology  check orthosttaic  agree with metioprolol er 25 mg daily for rate control  tsh  physical therapy for gait stability  lipid panel  awaiting MR  chest x ray ordered  encourage po fluid    	        
           CARDIOLOGY     PROGRESS  NOTE   ________________________________________________    CHIEF COMPLAINT:Patient is a 86y old  Female who presents with a chief [facility] at first denied fall but now admits she fell , hX dementia memory poor but usu aurora alland wanders (27 Nov 2021 10:50)  no complain.  	  REVIEW OF SYSTEMS:  CONSTITUTIONAL: No fever, weight loss, or fatigue  EYES: No eye pain, visual disturbances, or discharge  ENT:  No difficulty hearing, tinnitus, vertigo; No sinus or throat pain  NECK: No pain or stiffness  RESPIRATORY: No cough, wheezing, chills or hemoptysis; No Shortness of Breath  CARDIOVASCULAR: No chest pain, palpitations, passing out, dizziness, or leg swelling  GASTROINTESTINAL: No abdominal or epigastric pain. No nausea, vomiting, or hematemesis; No diarrhea or constipation. No melena or hematochezia.  GENITOURINARY: No dysuria, frequency, hematuria, or incontinence  NEUROLOGICAL: No headaches, memory loss, loss of strength, numbness, or tremors  SKIN: No itching, burning, rashes, or lesions   LYMPH Nodes: No enlarged glands  ENDOCRINE: No heat or cold intolerance; No hair loss  MUSCULOSKELETAL: No joint pain or swelling; No muscle, back, or extremity pain  PSYCHIATRIC: No depression, anxiety, mood swings, or difficulty sleeping  HEME/LYMPH: No easy bruising, or bleeding gums  ALLERGY AND IMMUNOLOGIC: No hives or eczema	    [ ] All others negative	  [ ] Unable to obtain    PHYSICAL EXAM:  T(C): 36.6 (11-28-21 @ 05:48), Max: 36.6 (11-27-21 @ 20:55)  HR: 62 (11-28-21 @ 05:48) (62 - 70)  BP: 108/62 (11-28-21 @ 05:48) (102/67 - 108/62)  RR: 17 (11-28-21 @ 05:48) (17 - 18)  SpO2: 95% (11-28-21 @ 05:48) (93% - 95%)  Wt(kg): --  I&O's Summary    27 Nov 2021 07:01  -  28 Nov 2021 07:00  --------------------------------------------------------  IN: 680 mL / OUT: 0 mL / NET: 680 mL        Appearance: Normal	  HEENT:   Normal oral mucosa, PERRL, EOMI	  Lymphatic: No lymphadenopathy  Cardiovascular: Normal S1 S2, No JVD, + murmurs, No edema  Respiratory: Lungs clear to auscultation	  Psychiatry: A & O x 3, Mood & affect appropriate  Gastrointestinal:  Soft, Non-tender, + BS	  Skin: No rashes, No ecchymoses, No cyanosis	  Neurologic: Non-focal  Extremities: Normal range of motion, No clubbing, cyanosis or edema  Vascular: Peripheral pulses palpable 2+ bilaterally    MEDICATIONS  (STANDING):  acetaminophen     Tablet .. 650 milliGRAM(s) Oral every 6 hours  apixaban 2.5 milliGRAM(s) Oral two times a day  diVALproex  milliGRAM(s) Oral daily  metoprolol succinate ER 25 milliGRAM(s) Oral daily      TELEMETRY: 	    ECG:  	  RADIOLOGY:  OTHER: 	  	  LABS:	 	    CARDIAC MARKERS:                  proBNP:   Lipid Profile:   HgA1c:   TSH:         Assessment and plan  ---------------------------  SDAT, wanders all day in [facility] refused to walk sio dtr brought her to the ed co acute  pain l hip  denied fall but now , no bruises CT , xr neg stil refuses to ambulates  confabulates , confused at times as usual  pt is a 85 yo female with hx of chronic a.fib on AC s/p fall. ?etiology  check orthosttaic  agree with metioprolol er 25 mg daily for rate control  tsh  physical therapy for gaith stability  lipid panel  hip MR noted  chest x ray ordered  encourage po fluid  not orthostatic  no objection to dc cardiac wise  dvt prophylaxis  physical therapy, increase ambulation  oob to chair  dvt prophylaxis    	        
           CARDIOLOGY     PROGRESS  NOTE   ________________________________________________    CHIEF COMPLAINT:Patient is a 86y old  Female who presents with a chief complaint of refused to ambuklate co pain L hip at first denied fall but now admits she fell , hX dementia memory poor but usu aurora alland wanders (29 Nov 2021 10:09)    	  REVIEW OF SYSTEMS:  CONSTITUTIONAL: No fever, weight loss, or fatigue  EYES: No eye pain, visual disturbances, or discharge  ENT:  No difficulty hearing, tinnitus, vertigo; No sinus or throat pain  NECK: No pain or stiffness  RESPIRATORY: No cough, wheezing, chills or hemoptysis; + Shortness of Breath  CARDIOVASCULAR: No chest pain, palpitations, passing out, dizziness, or leg swelling  GASTROINTESTINAL: No abdominal or epigastric pain. No nausea, vomiting, or hematemesis; No diarrhea or constipation. No melena or hematochezia.  GENITOURINARY: No dysuria, frequency, hematuria, or incontinence  NEUROLOGICAL: No headaches, memory loss, loss of strength, numbness, or tremors  SKIN: No itching, burning, rashes, or lesions   LYMPH Nodes: No enlarged glands  ENDOCRINE: No heat or cold intolerance; No hair loss  MUSCULOSKELETAL: No joint pain or swelling; No muscle, back, or extremity pain  PSYCHIATRIC: No depression, anxiety, mood swings, or difficulty sleeping  HEME/LYMPH: No easy bruising, or bleeding gums  ALLERGY AND IMMUNOLOGIC: No hives or eczema	    [ ] All others negative	  [ ] Unable to obtain    PHYSICAL EXAM:  T(C): 36.6 (11-30-21 @ 06:21), Max: 36.9 (11-29-21 @ 20:25)  HR: 78 (11-30-21 @ 06:21) (70 - 86)  BP: 154/81 (11-30-21 @ 06:21) (118/63 - 154/81)  RR: 18 (11-30-21 @ 06:21) (18 - 18)  SpO2: 97% (11-30-21 @ 06:21) (93% - 97%)  Wt(kg): --  I&O's Summary    29 Nov 2021 07:01  -  30 Nov 2021 07:00  --------------------------------------------------------  IN: 880 mL / OUT: 0 mL / NET: 880 mL        Appearance: Normal	  HEENT:   Normal oral mucosa, PERRL, EOMI	  Lymphatic: No lymphadenopathy  Cardiovascular: Normal S1 S2, No JVD, + murmurs, No edema  Respiratory: Lungs clear to auscultation	  Psychiatry: A & O x 3, Mood & affect appropriate  Gastrointestinal:  Soft, Non-tender, + BS	  Skin: No rashes, No ecchymoses, No cyanosis	  Neurologic: Non-focal  Extremities: Normal range of motion, No clubbing, cyanosis or edema  Vascular: Peripheral pulses palpable 2+ bilaterally    MEDICATIONS  (STANDING):  acetaminophen     Tablet .. 650 milliGRAM(s) Oral every 6 hours  apixaban 2.5 milliGRAM(s) Oral two times a day  diVALproex  milliGRAM(s) Oral daily  metoprolol succinate ER 25 milliGRAM(s) Oral daily      TELEMETRY: 	    ECG:  	  RADIOLOGY:  OTHER: 	  	  LABS:	 	    CARDIAC MARKERS:                                12.2   5.90  )-----------( 246      ( 29 Nov 2021 07:21 )             38.8     11-29    139  |  103  |  30<H>  ----------------------------<  90  3.9   |  25  |  0.53    Ca    9.4      29 Nov 2021 07:22  Phos  3.0     11-29  Mg     2.1     11-29      proBNP:   Lipid Profile:   HgA1c:   TSH:         Assessment and plan  ---------------------------  SDAT, wanders all day in [place] refused to walk sio dtr brought her to the ed co acute  pain l hip  denied fall but now , no bruises CT , xr neg stil refuses to ambulates  confabulates , confused at times as usual  pt is a 85 yo female with hx of chronic a.fib on AC s/p fall. ?etiology  check orthosttaic  agree with metioprolol er 25 mg daily for rate control  tsh  physical therapy for gaith stability  lipid panel  hip MR noted  chest x ray ordered  encourage po fluid  not orthostatic  no objection to dc cardiac wise  dvt prophylaxis  physical therapy, increase ambulation  oob to chair  dvt prophylaxis  awaiting rehab  increase po fluid intake  awaiting rehab    	        
           CARDIOLOGY     PROGRESS  NOTE   ________________________________________________    CHIEF COMPLAINT:Patient is a 86y old  Female who presents with a chief complaint of refused to ambulate co pain L hip at first denied fall but now admits she fell , hX dementia memory poor but usu aurora alland wanders (01 Dec 2021 09:31)  no complain.  	  REVIEW OF SYSTEMS:  CONSTITUTIONAL: No fever, weight loss, or fatigue  EYES: No eye pain, visual disturbances, or discharge  ENT:  No difficulty hearing, tinnitus, vertigo; No sinus or throat pain  NECK: No pain or stiffness  RESPIRATORY: No cough, wheezing, chills or hemoptysis; No Shortness of Breath  CARDIOVASCULAR: No chest pain, palpitations, passing out, dizziness, or leg swelling  GASTROINTESTINAL: No abdominal or epigastric pain. No nausea, vomiting, or hematemesis; No diarrhea or constipation. No melena or hematochezia.  GENITOURINARY: No dysuria, frequency, hematuria, or incontinence  NEUROLOGICAL: No headaches, memory loss, loss of strength, numbness, or tremors  SKIN: No itching, burning, rashes, or lesions   LYMPH Nodes: No enlarged glands  ENDOCRINE: No heat or cold intolerance; No hair loss  MUSCULOSKELETAL: No joint pain or swelling; No muscle, back, or extremity pain  PSYCHIATRIC: No depression, anxiety, mood swings, or difficulty sleeping  HEME/LYMPH: No easy bruising, or bleeding gums  ALLERGY AND IMMUNOLOGIC: No hives or eczema	    [ ] All others negative	  [ ] Unable to obtain    PHYSICAL EXAM:  T(C): 36.7 (12-01-21 @ 05:04), Max: 37 (11-30-21 @ 20:04)  HR: 73 (12-01-21 @ 05:04) (60 - 73)  BP: 118/62 (12-01-21 @ 05:04) (118/62 - 133/72)  RR: 18 (12-01-21 @ 05:04) (18 - 18)  SpO2: 96% (12-01-21 @ 05:04) (95% - 96%)  Wt(kg): --  I&O's Summary    30 Nov 2021 07:01  -  01 Dec 2021 07:00  --------------------------------------------------------  IN: 1160 mL / OUT: 4 mL / NET: 1156 mL        Appearance: Normal	  HEENT:   Normal oral mucosa, PERRL, EOMI	  Lymphatic: No lymphadenopathy  Cardiovascular: Normal S1 S2, No JVD, + murmurs, No edema  Respiratory: Lungs clear to auscultation	  Gastrointestinal:  Soft, Non-tender, + BS	  Skin: No rashes, No ecchymoses, No cyanosis	  Neurologic: Non-focal  Extremities: Normal range of motion, No clubbing, cyanosis or edema  Vascular: Peripheral pulses palpable 2+ bilaterally    MEDICATIONS  (STANDING):  acetaminophen     Tablet .. 650 milliGRAM(s) Oral every 6 hours  apixaban 2.5 milliGRAM(s) Oral two times a day  diVALproex  milliGRAM(s) Oral daily  metoprolol succinate ER 25 milliGRAM(s) Oral daily      TELEMETRY: 	    ECG:  	  RADIOLOGY:  OTHER: 	  	  LABS:	 	    CARDIAC MARKERS:                  proBNP:   Lipid Profile:   HgA1c:   TSH:   Notes: Transportation benefit verfication for out of network BLS ambulance:  Ericka Gipson [phone], EMS code 56,  non emergent ambulance covered at 100%,  out of network benefit included, copay $250., unlimited benefit. Call  Ref#  [id number]      Assessment and plan  ---------------------------  bimal KHANNA all day in [facility] refused to walk sio dtr brought her to the ed co acute  pain l hip  denied fall but now , no bruises CT , xr neg stil refuses to ambulates  confabulates , confused at times as usual  pt is a 87 yo female with hx of chronic a.fib on AC s/p fall. ?etiology  check orthosttaic  agree with metioprolol er 25 mg daily for rate control  tsh  physical therapy for gaith stability  lipid panel  hip MR noted  chest x ray ordered  encourage po fluid  not orthostatic  no objection to dc cardiac wise  dvt prophylaxis  physical therapy, increase ambulation  oob to chair  dvt prophylaxis  awaiting rehab  increase po fluid intake  awaiting rehab    	        
DATE OF SERVICE: 11-23-21 @ 09:25    HPI:  JEY, bimal all day in [place] refused to walk sio dtr brought her to the ed co acute  pain l hip  denied fall but now ad mits she fell, no bruises CT , xr neg   stil refuses to ambulates  confabulates , confused at times as usual   (22 Nov 2021 12:42)      Interval Events  still in Ed, slept ok , still waiting on MRI but feekls better wants to ambulate this am, denies pain, atmae  depakote ch to ER    MEDICATIONS  (STANDING):  apixaban 2.5 milliGRAM(s) Oral two times a day  diVALproex  milliGRAM(s) Oral daily    MEDICATIONS  (PRN):      Patient is a 86y old  Female who presents with a chief complaint of refused to ambuklate co pain L hip at first denied fall but now admits she fell , hX dementia memory poor but usu aurora wallis (22 Nov 2021 12:42)      REVIEW OF SYSTEMS    General:nad no pain no co  Skin/Breast:  Ophthalmologic:no ch no co  ENMT:	no co no todd wants bkfast  Respiratory and Thorax:no cough no sp no sob  Cardiovascular:no cp no palp  Gastrointestinal:no nvcd  Genitourinary:no fdi  Musculoskeletal:	no an p  Neurological:	no ch no co  Psychiatric:	no co  Hematology/Lymphatics:	  Endocrine:	no polyudd  Allergic/Immunologic:  AOSN	y      Vital Signs Last 24 Hrs  T(C): 36.7 (23 Nov 2021 08:30), Max: 36.8 (22 Nov 2021 11:51)  T(F): 98 (23 Nov 2021 08:30), Max: 98.3 (22 Nov 2021 15:45)  HR: 90 (23 Nov 2021 08:30) (61 - 108)  BP: 117/58 (23 Nov 2021 08:30) (117/58 - 145/79)  BP(mean): --  RR: 18 (23 Nov 2021 08:30) (16 - 18)  SpO2: 94% (23 Nov 2021 08:30) (94% - 98%)    PHYSICAL EXAM:    Constitutional:vss nad  H+N ncat  Eyes:natalie cwnl  ENMT:mmm hears and speaks clearly  Neck:no cn no tm  Breasts:  Back:  Respiratory:end insp w R clears after 1 br. L ctab no rrw   Cardiovascular: irreg irreg m  Gastrointestinal:soft nt no g  Genitourinary:  Rectal:  Extremities:no cce  Vascular:hm-, vv-  Neurological:demebted nfatmae s opain  Skin:cdi pale  Lymph Nodes:  Musculoskeletal:goog rom now  Psychiatric:    LABS  CBC Full  -  ( 21 Nov 2021 19:34 )  WBC Count : 7.59 K/uL  RBC Count : 3.69 M/uL  Hemoglobin : 11.7 g/dL  Hematocrit : 35.8 %  Platelet Count - Automated : 180 K/uL  Mean Cell Volume : 97.0 fl  Mean Cell Hemoglobin : 31.7 pg  Mean Cell Hemoglobin Concentration : 32.7 gm/dL  Auto Neutrophil # : 5.61 K/uL  Auto Lymphocyte # : 1.36 K/uL  Auto Monocyte # : 0.58 K/uL  Auto Eosinophil # : 0.01 K/uL  Auto Basophil # : 0.01 K/uL  Auto Neutrophil % : 74.0 %  Auto Lymphocyte % : 17.9 %  Auto Monocyte % : 7.6 %  Auto Eosinophil % : 0.1 %  Auto Basophil % : 0.1 %      11-21    136  |  102  |  19  ----------------------------<  130<H>  4.0   |  23  |  0.43<L>    Ca    9.2      21 Nov 2021 19:34    TPro  7.1  /  Alb  4.0  /  TBili  0.4  /  DBili  x   /  AST  17  /  ALT  8<L>  /  AlkPhos  53  11-21          Imaging:    Xray:    Echo:    CT:    MRI:    Tele:    Orders:    BRUNO Dewitt [phone]
DATE OF SERVICE: 11-24-21 @ 09:25    HPI:  SDAT, wanders all day in [facility] refused to walk sio dtr brought her to the ed co acute  pain l hip  denied fall but now ad mits she fell, no bruises CT , xr neg   stil refuses to ambulates  confabulates , confused at times as usual   (22 Nov 2021 12:42)      Interval Events  waiting for MRI this yaima 5pm - will need sedation - ativan 2 h b4 should be ok  Had rapid af last nite - restarted BBlkr - demented  lives at [facility] _ - toprol Er shopulsd be ok but cardio Dr Beckford will see pt today  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, no ac prev 2/2 falls  but hasnt fallen recently at least not known 2/2 SDAT    MEDICATIONS  (STANDING):  apixaban 2.5 milliGRAM(s) Oral two times a day  diVALproex  milliGRAM(s) Oral daily    MEDICATIONS  (PRN):      Patient is a 86y old  Female who presents with a chief complaint of refused to ambuklate co pain L hip at first denied fall but now admits she fell , hX dementia memory poor but usu ambukates alland wanders (23 Nov 2021 14:50)      REVIEW OF SYSTEMS    General: nad no pain now but hasnt been oob yet  Skin/Breast:  Ophthalmologic:no co no ch  ENMT:	mmm ate   Respiratory and Thorax:no coughj no sp no sob  Cardiovascular:no cp no palp  Gastrointestinal:no nvcd  Genitourinary:no fdi  Musculoskeletal:	no a no p  Neurological:	no co no ch  Psychiatric:	demented  Hematology/Lymphatics:	  Endocrine:	no polyudd  Allergic/Immunologic:  AOSN	y      Vital Signs Last 24 Hrs  T(C): 36.9 (24 Nov 2021 08:36), Max: 37.2 (23 Nov 2021 12:41)  T(F): 98.5 (24 Nov 2021 08:36), Max: 98.9 (23 Nov 2021 12:41)  HR: 88 (24 Nov 2021 08:36) (88 - 128)  BP: 120/62 (24 Nov 2021 08:36) (120/62 - 143/81)  BP(mean): --  RR: 18 (24 Nov 2021 08:36) (18 - 18)  SpO2: 93% (24 Nov 2021 08:36) (93% - 98%)    PHYSICAL EXAM:    Constitutional:nad no co noch  nc at   natalie cwnl,   Eyes:  ENMT:mmm swallows speaks  Neck:no cb no tm  Breasts:  Back:  Respiratory:ctab no rrw  Cardiovascular:rrr  Gastrointestinal:soft nt  Genitourinary:  Rectal:  Extremities:no cce  Vascular:hm- vv-  Neurological:nfatmae in bed, confyused but does eremember  Skin:  Lymph Nodes:  Musculoskeletal:god rom  Psychiatric:    LABS                  Imaging:    Xray:    Echo:    CT:    MRI:    Tele:    Orders:    BRUNO Dewitt [phone]
DATE OF SERVICE: 11-30-21 @ 09:42    HPI:  JEY, bimal all day in [facility] refused to walk sio dtr brought her to the ed co acute  pain l hip  denied fall but now ad mits she fell, no bruises CT , xr neg   stil refuses to ambulates  confabulates , confused at times as usual   (22 Nov 2021 12:42)      Interval Events  waiting for insurance clearance and acceptance at [facility]  otherwise no ch overnite    MEDICATIONS  (STANDING):  acetaminophen     Tablet .. 650 milliGRAM(s) Oral every 6 hours  apixaban 2.5 milliGRAM(s) Oral two times a day  diVALproex  milliGRAM(s) Oral daily  metoprolol succinate ER 25 milliGRAM(s) Oral daily    MEDICATIONS  (PRN):      Patient is a 86y old  Female who presents with a chief complaint of refused to ambuklate co pain L hip at first denied fall but now admits she fell , hX dementia memory poor but usu andrewdevanguy wallis (30 Nov 2021 08:24)      REVIEW OF SYSTEMS    General:no co no ch    Skin/Breast:  Ophthalmologic:no co no ch  ENMT:	no co no ch, ate bkfast no prob  Respiratory and Thorax:no cough no sp no sob  Cardiovascular:  no cp no palp  Gastrointestinal:no nvcd  Genitourinary:no fdi  Musculoskeletal:	not co pain , doesnt even remember comparitively, "sore" L Hip  Neurological:	co memory poor  Psychiatric:	  Hematology/Lymphatics:	  Endocrine:	no polyuddd  Allergic/Immunologic:  AOSN	y      Vital Signs Last 24 Hrs  T(C): 36.6 (30 Nov 2021 06:21), Max: 36.9 (29 Nov 2021 20:25)  T(F): 97.8 (30 Nov 2021 06:21), Max: 98.5 (29 Nov 2021 20:25)  HR: 78 (30 Nov 2021 06:21) (70 - 86)  BP: 154/81 (30 Nov 2021 06:21) (118/63 - 154/81)  BP(mean): --  RR: 18 (30 Nov 2021 06:21) (18 - 18)  SpO2: 97% (30 Nov 2021 06:21) (93% - 97%)    PHYSICAL EXAM:    Constitutional:vss nad no co   H+N ncat  Eyes:natalie cwnl  ENMT:hears speaks ate well  Neck:no cb no tm  Breasts:  Back:  Respiratory:ctab no rrw  Cardiovascular:rrr m distant  Gastrointestinal:soft nt  Genitourinary:no fdi  Rectal:  Extremities:no cce  Vascular:hm- vv-  Neurological:st mem v poor . aware of it, nfatmae  Skin:cdi  Lymph Nodes:  Musculoskeletal:good active rom , no pain in bed  Psychiatric:    LABS  CBC Full  -  ( 29 Nov 2021 07:21 )  WBC Count : 5.90 K/uL  RBC Count : 3.93 M/uL  Hemoglobin : 12.2 g/dL  Hematocrit : 38.8 %  Platelet Count - Automated : 246 K/uL  Mean Cell Volume : 98.7 fl  Mean Cell Hemoglobin : 31.0 pg  Mean Cell Hemoglobin Concentration : 31.4 gm/dL  Auto Neutrophil # : x  Auto Lymphocyte # : x  Auto Monocyte # : x  Auto Eosinophil # : x  Auto Basophil # : x  Auto Neutrophil % : x  Auto Lymphocyte % : x  Auto Monocyte % : x  Auto Eosinophil % : x  Auto Basophil % : x      11-29    139  |  103  |  30<H>  ----------------------------<  90  3.9   |  25  |  0.53    Ca    9.4      29 Nov 2021 07:22  Phos  3.0     11-29  Mg     2.1     11-29            Imaging:    Xray:    Echo:    CT:    MRI:    Tele:    Orders:    BRUNO Dewitt [phone]
DATE OF SERVICE: 11-27-21 @ 10:50    HPI:  bimal KHANNA all day in [place] refused to walk sio dtr brought her to the ed co acute  pain l hip  denied fall but now ad mits she fell, no bruises CT , xr neg   stil refuses to ambulates  confabulates , confused at times as usual   (22 Nov 2021 12:42)      Interval Events  dis c c [facility] twice yesterday and c NP  needs PT ambulated 75'x2 so can rtn to [facility] but dtr insisted on [facility], even th pt may not qualify, can amb indep and PT rec home   wants to get oob today    MEDICATIONS  (STANDING):  acetaminophen     Tablet .. 650 milliGRAM(s) Oral every 6 hours  apixaban 2.5 milliGRAM(s) Oral two times a day  diVALproex  milliGRAM(s) Oral daily  metoprolol succinate ER 25 milliGRAM(s) Oral daily    MEDICATIONS  (PRN):      Patient is a 86y old  Female who presents with a chief complaint of refused to ambuklate co pain L hip at first denied fall but now admits she fell , hX dementia memory poor but usu aurora wallis (27 Nov 2021 09:48)      REVIEW OF SYSTEMS    General:nad no co ate  Skin/Breast:  Ophthalmologic:no co no ch  ENMT:	hears eats speaks cearly  Respiratory and Thorax:no cough no sp no sob  Cardiovascular:no cp no palp  Gastrointestinal:no nvcd  Genitourinary:no fdi  Musculoskeletal:	no a npo pain better "onlt alitte"  Neurological:no co no ch  Psychiatric:	  Hematology/Lymphatics:	  Endocrine:	no polyudd  Allergic/Immunologic:  AOSN	y      Vital Signs Last 24 Hrs  T(C): 36.4 (27 Nov 2021 08:36), Max: 37.1 (26 Nov 2021 13:36)  T(F): 97.6 (27 Nov 2021 08:36), Max: 98.7 (26 Nov 2021 13:36)  HR: 59 (27 Nov 2021 08:36) (59 - 84)  BP: 107/68 (27 Nov 2021 08:36) (107/58 - 115/73)  BP(mean): --  RR: 18 (27 Nov 2021 08:36) (16 - 18)  SpO2: 98% (27 Nov 2021 08:36) (94% - 98%)    PHYSICAL EXAM:    Constitutional:vss nad  H+Nncat   Eyes:natlaie cwnl  ENMT:hears swallows speaks  Neck:no cb no tm  Breasts:  Back:  Respiratory:ctab no rrw  Cardiovascular:rrr  Gastrointestinal:soft nt  Genitourinary:  Rectal:  Extremities:no cce  Vascular:hm- vv-  Neurological:nfatmae st memv poor  Skin:cdi  Lymph Nodes:  Musculoskeletal:good rom no pain  Psychiatric:    LABS  CBC Full  -  ( 26 Nov 2021 07:12 )  WBC Count : 7.03 K/uL  RBC Count : 3.95 M/uL  Hemoglobin : 12.3 g/dL  Hematocrit : 38.2 %  Platelet Count - Automated : 249 K/uL  Mean Cell Volume : 96.7 fl  Mean Cell Hemoglobin : 31.1 pg  Mean Cell Hemoglobin Concentration : 32.2 gm/dL  Auto Neutrophil # : x  Auto Lymphocyte # : x  Auto Monocyte # : x  Auto Eosinophil # : x  Auto Basophil # : x  Auto Neutrophil % : x  Auto Lymphocyte % : x  Auto Monocyte % : x  Auto Eosinophil % : x  Auto Basophil % : x      11-26    140  |  102  |  32<H>  ----------------------------<  100<H>  3.9   |  26  |  0.56    Ca    9.3      26 Nov 2021 07:11            Imaging:    Xray:    Echo:    CT:    MRI:    Tele:    Orders:    BRUNO Dewitt [phone]
DATE OF SERVICE: 11-28-21 @ 12:03    HPI:  SDAT, wanrosa isela all day in [place] refused to walk sio dtr brought her to the ed co acute  pain l hip  denied fall but now ad mits she fell, no bruises CT , xr neg   stil refuses to ambulates  confabulates , confused at times as usual   (22 Nov 2021 12:42)      Interval Events    MEDICATIONS  (STANDING):  acetaminophen     Tablet .. 650 milliGRAM(s) Oral every 6 hours  apixaban 2.5 milliGRAM(s) Oral two times a day  diVALproex  milliGRAM(s) Oral daily  metoprolol succinate ER 25 milliGRAM(s) Oral daily    MEDICATIONS  (PRN):      Patient is a 86y old  Female who presents with a chief complaint of refused to ambuklate co pain L hip at first denied fall but now admits she fell , hX dementia memory poor but usu andrewukaguy alland wanders (28 Nov 2021 09:29)      REVIEW OF SYSTEMS    General:nad, no co co mem poor  Skin/Breast:  Ophthalmologic:no ch no co  ENMT:	no ch no co,   Respiratory and Thorax:no cough no sop no sob  Cardiovascular:no cp no palp  Gastrointestinal: nonvcd had bm  Genitourinary:no fdi  Musculoskeletal:	no a no pain  Neurological:	no ch no co  Psychiatric:	  Hematology/Lymphatics:	  Endocrine:	no polyudd  Allergic/Immunologic:  AOSN	y      Vital Signs Last 24 Hrs  T(C): 36.6 (28 Nov 2021 05:48), Max: 36.6 (27 Nov 2021 20:55)  T(F): 97.9 (28 Nov 2021 05:48), Max: 97.9 (28 Nov 2021 05:48)  HR: 62 (28 Nov 2021 05:48) (62 - 70)  BP: 108/62 (28 Nov 2021 05:48) (102/67 - 108/62)  BP(mean): --  RR: 17 (28 Nov 2021 05:48) (17 - 18)  SpO2: 95% (28 Nov 2021 05:48) (93% - 95%)    PHYSICAL EXAM:    Constitutional:nad no co mempoor broderick st  H+N ncat  Eyes:natalie cwnl  ENMT:hears swallos eats spesaks ok  Neck: nocb no tm  Breasts:  Back:  Respiratory:ctab no rrw  Cardiovascular:rrrm  Gastrointestinal:soft nt  Genitourinary:  Rectal:  Extremities:no cce  Vascular:hm-vv-  Neurological:nfatmae in bed  speech lear st mem,v poor  Skin:cdi  Lymph Nodes:  Musculoskeletal:  Psychiatric:calm coop as us    LABS                  Imaging:    Xray:    Echo:    CT:    MRI:    Tele:    Orders:    BRUNO Dewitt [phone]
DATE OF SERVICE: 11-29-21 @ 10:00    HPI:  bimal KHANNA all day in [place] refused to walk sio dtr brought her to the ed co acute  pain l hip  denied fall but now ad mits she fell, no bruises CT , xr neg   stil refuses to ambulates  confabulates , confused at times as usual   (22 Nov 2021 12:42)      Interval Events  labs ok, doesnot neeed daily labs  st memory very poor, was oob? ambulating?  dtr specified rehab req [facility]    MEDICATIONS  (STANDING):  acetaminophen     Tablet .. 650 milliGRAM(s) Oral every 6 hours  apixaban 2.5 milliGRAM(s) Oral two times a day  diVALproex  milliGRAM(s) Oral daily  metoprolol succinate ER 25 milliGRAM(s) Oral daily    MEDICATIONS  (PRN):      Patient is a 86y old  Female who presents with a chief complaint of refused to ambuklate co pain L hip at first denied fall but now admits she fell , hX dementia memory poor but [facility] (29 Nov 2021 08:14)      REVIEW OF SYSTEMS    General:nad no co now in bed nad  Skin/Breast:  Ophthalmologic:noco no ch  ENMT:	ears speaks eats ok  Respiratory and Thorax:o cough no sp no sob  Cardiovascular:no cp no palp  Gastrointestinal:no nvcd  Genitourinary:no fdi  Musculoskeletal:	no a no pain broderick not in bed  Neurological:	mem v poor st, converses   Psychiatric:not dep, always calm coop	  Hematology/Lymphatics:	  Endocrine:	no polyudd  Allergic/Immunologic:  AOSN	y      Vital Signs Last 24 Hrs  T(C): 36.5 (29 Nov 2021 05:26), Max: 37.1 (28 Nov 2021 19:53)  T(F): 97.7 (29 Nov 2021 05:26), Max: 98.8 (28 Nov 2021 19:53)  HR: 78 (29 Nov 2021 05:26) (61 - 88)  BP: 125/68 (29 Nov 2021 05:26) (112/63 - 125/68)  BP(mean): --  RR: 18 (29 Nov 2021 05:26) (18 - 18)  SpO2: 97% (29 Nov 2021 05:26) (96% - 98%)    PHYSICAL EXAM:    Constitutional:nad vss  H+N ncat   Eyes:natalie cwnl  ENMT: hears swallows eats ok mmm  Neck:no cb no tm  Back:  Respiratory:ctab no rrw  Cardiovascular:rrr now  Gastrointestinal:soft nt  Genitourinary:  Rectal:  Extremities:no cce  Vascular:hm- vv-  Neurological:mem st v poor wanders when well  Skin:  Lymph Nodes:  Musculoskeletal:no a no p now atmae in bed  Psychiatric:    LABS  CBC Full  -  ( 29 Nov 2021 07:21 )  WBC Count : 5.90 K/uL  RBC Count : 3.93 M/uL  Hemoglobin : 12.2 g/dL  Hematocrit : 38.8 %  Platelet Count - Automated : 246 K/uL  Mean Cell Volume : 98.7 fl  Mean Cell Hemoglobin : 31.0 pg  Mean Cell Hemoglobin Concentration : 31.4 gm/dL  Auto Neutrophil # : x  Auto Lymphocyte # : x  Auto Monocyte # : x  Auto Eosinophil # : x  Auto Basophil # : x  Auto Neutrophil % : x  Auto Lymphocyte % : x  Auto Monocyte % : x  Auto Eosinophil % : x  Auto Basophil % : x      11-29    139  |  103  |  30<H>  ----------------------------<  90  3.9   |  25  |  0.53    Ca    9.4      29 Nov 2021 07:22  Phos  3.0     11-29  Mg     2.1     11-29            Imaging:    Xray:    Echo:    CT:    MRI:    Tele:    Orders:    BRUNO Dewitt [phone]
DATE OF SERVICE: 11-29-21 @ 10:10    HPI:  bimal KHANNA all day in [facility] refused to walk sio dtr brought her to the ed co acute  pain l hip  denied fall but now ad mits she fell, no bruises CT , xr neg   stil refuses to ambulates  confabulates , confused at times as usual   (22 Nov 2021 12:42)      Interval Events  feels better strongervtransfered oob indep, wants to go home  getting PT here now , has equip for home, lives c spouse  slept ok bm ok    MEDICATIONS  (STANDING):  acetaminophen     Tablet .. 650 milliGRAM(s) Oral every 6 hours  apixaban 2.5 milliGRAM(s) Oral two times a day  diVALproex  milliGRAM(s) Oral daily  metoprolol succinate ER 25 milliGRAM(s) Oral daily    MEDICATIONS  (PRN):      Patient is a 84y old  Female who presents with a chief complaint of refused to ambuklate co pain L hip at first denied fall but now admits she fell , hX dementia memory poor but usu aurora wallis (29 Nov 2021 10:00)      REVIEW OF SYSTEMS    General:nad no co now wants to go home today  Skin/Breast:  Ophthalmologic:no co no ch  ENMT:	hears swallows speaks clearly  Respiratory and Thorax:no cough no sp no sob  Cardiovascular:no cp no palp  Gastrointestinal:no nvcd  Genitourinary:no fdi  Musculoskeletal:	pain better better rom  Neurological:	no co no ch  Psychiatric:	no co  Hematology/Lymphatics:	  Endocrine:	no polyudd  Allergic/Immunologic:  AOSN	y      Vital Signs Last 24 Hrs  T(C): 36.5 (29 Nov 2021 05:26), Max: 37.1 (28 Nov 2021 19:53)  T(F): 97.7 (29 Nov 2021 05:26), Max: 98.8 (28 Nov 2021 19:53)  HR: 78 (29 Nov 2021 05:26) (61 - 88)  BP: 125/68 (29 Nov 2021 05:26) (112/63 - 125/68)  BP(mean): --  RR: 18 (29 Nov 2021 05:26) (18 - 18)  SpO2: 97% (29 Nov 2021 05:26) (96% - 98%)    PHYSICAL EXAM:    Constitutional:vss nad  H+N quyen p fall  Eyes:natalie cwnl  ENMT:hears swallows eats speaks clearly  Neck:no cb no tm  Breasts:  Back:  Respiratory:ctab no rrw  Cardiovascular:rrr  Gastrointestinal:soft nt  Genitourinary:  Rectal:  Extremities:no cce  Vascular:hm- vv-  Neurological:nfatmae in bed  ambulated indep short dist  Skin:cdi  Lymph Nodes:  Musculoskeletal:oa djd bony changes hands + other jts  Psychiatric:    LABS  CBC Full  -  ( 29 Nov 2021 07:21 )  WBC Count : 5.90 K/uL  RBC Count : 3.93 M/uL  Hemoglobin : 12.2 g/dL  Hematocrit : 38.8 %  Platelet Count - Automated : 246 K/uL  Mean Cell Volume : 98.7 fl  Mean Cell Hemoglobin : 31.0 pg  Mean Cell Hemoglobin Concentration : 31.4 gm/dL  Auto Neutrophil # : x  Auto Lymphocyte # : x  Auto Monocyte # : x  Auto Eosinophil # : x  Auto Basophil # : x  Auto Neutrophil % : x  Auto Lymphocyte % : x  Auto Monocyte % : x  Auto Eosinophil % : x  Auto Basophil % : x      11-29    139  |  103  |  30<H>  ----------------------------<  90  3.9   |  25  |  0.53    Ca    9.4      29 Nov 2021 07:22  Phos  3.0     11-29  Mg     2.1     11-29            Imaging:    Xray:    Echo:    CT:    MRI:    Tele:    Orders:    BRUNO Dewitt [phone]

## 2021-12-01 NOTE — PROGRESS NOTE ADULT - ASSESSMENT
diaz=s not need daily labs  needs to be oob and ambulate  dc planning tho doubt any plan before monday 11/29  fALL needs PT  CONTUSION L HIP no mbruise  pain L hip\better  ataxianeeds PT  alzheimers ST memv poor  PAF ac  MVRsee cardio no further swan Rx disc cc DR GARCIA again today  needs dc plan after weekend
sandra planning on going  disc c cardio again, whitley speak c team and family again  Fall (?)  contusion L Hip - no fx  Pain L Hip ambulating  Ataxic gait - needs [facility]  Alzheimers  PAF   - ac  MVR   "
Disc c NP Katarina, disc cDte Silvia now, disc c cardio pranay Rivera for dc but needs SAER because of decr amb and lives in [place] and needs more help cannot fxn w help avail at [facility] at this time  Fall Contusion L Hip  Alzheimers  PAF  MVR- on eliquis
MRI is ordered but pt feels better deserves a trial at ambulation now even before MRI. if cannot the fu  consider ddc back to [facility] if ambulates,  disc c [facility]  pain l hip res  r/ Fx  PAF  MVR  SDAT    
dc home c home care and home PT, has equipment, needs PT  p Fall - shower, no loc, no vertigo  Contusion R Hip  Pain R Hip  OA DJD muklt jts  TKR bL  THR BL  Spinal stenosis  spinal fixation Sx Hx  CTS Sx  Cystocele  fu elev wbc  fu daily etoh tho not sangita to this fall nor adm  
no further hosp swan, start dc planning  does noyt need daily laabs  Fall?  Contusion L Hip  pain L hip ambulating  Ataxic gait  Alzheimers  PAF -  ac  MVR  
ambulating - needs asist  dc planning for rehab if acceptable see PT notes, disc c cardio again today  Fall?  contusionHip L  Ataxia  Pain L hip ambulating  Alzheimers  PAF ac  MVR ac  
disc dc plan c CC yesterday the n dtr, againthis am c pta nd CC phillip - plan dc home to [facility] this am  Has [facility] level of care at [facility] so she will ge assist danial Carey there and home care and PT and can FU w me there after dc  no new labs/isc c Cardio, of for dc  Fall  Contusion L hip  pain L hip - res  ataxia - can ambulate butneeds PT, home care which I will certify  Jeremy  PAF - ac  MVR   "  
MRI, ro Fx Hip   can be oob   SDAT  PAF rvr >ECHO? BBlkr, ? ac?  MVR  disc c Cardio, PARVIZ Quinones  dtr and will call again  
fu mri, needs PT meeds to be oob and ambulate if no fx  AF nl r=ate 70's can cont bblkr  SDAT  MVR   if no Fx p[oss dc tomorroew to [facility] or maybe [facility]

## 2021-12-01 NOTE — DIETITIAN INITIAL EVALUATION ADULT. - OTHER INFO
Denies nausea/vomit :  Denies difficulty chewing /swallow :  Denies diarrhea/constipation:  Last BM : yesterday as per pt  NKFA  Ht: 65"  Ht taken from pt  Dosing ht: 167.6cm  Dosing wt: 49.9kg  Ht used for BMI 65"  Wt used for BMI dosing 49.9kg  Education Provided : pt was educated on the importance of supplements to increase calorie and protein intake in light of RD's nutritional findings-pt refused supplements at this time  pressure injury: none

## 2021-12-01 NOTE — PROGRESS NOTE ADULT - NUTRITIONAL ASSESSMENT
rs
This patient has been assessed with a concern for Malnutrition and has been determined to have a diagnosis/diagnoses of Moderate protein-calorie malnutrition and Underweight (BMI < 19).    This patient is being managed with:   Diet Regular-  Entered: Nov 23 2021  8:53AM

## 2021-12-01 NOTE — DIETITIAN INITIAL EVALUATION ADULT. - PERTINENT MEDS FT
MEDICATIONS  (STANDING):  acetaminophen     Tablet .. 650 milliGRAM(s) Oral every 6 hours  apixaban 2.5 milliGRAM(s) Oral two times a day  diVALproex  milliGRAM(s) Oral daily  metoprolol succinate ER 25 milliGRAM(s) Oral daily    MEDICATIONS  (PRN):

## 2021-12-01 NOTE — PHARMACOTHERAPY INTERVENTION NOTE - OUTCOME
accepted
accepted
Physical Exam  CONSTITUTIONAL:                                                         NAD  NEURO:                                                                       A&O x 3 no focal defects                      EYES:                                                                                WNL  ENMT:                                                                               WNL  CV:                                                                                   RRR no M/R/G  RESPIRATORY:                                                                 CTA bilaterally   GI:                                                                                     soft, non-tender   : LUA + / -                                                                  WNL  MUSKULOSKELETAL:                                                       WNL  SKIN / BREAST:                                                                  WNL

## 2021-12-01 NOTE — DISCHARGE NOTE NURSING/CASE MANAGEMENT/SOCIAL WORK - NSDCPEFALRISK_GEN_ALL_CORE
For information on Fall & Injury Prevention, visit: [email or web address] OR  [email or web address] OR  https://www.cdc.gov/steadi/patient.html

## 2021-12-01 NOTE — DIETITIAN INITIAL EVALUATION ADULT. - ORAL INTAKE PTA/DIET HISTORY
eggs, toast, rollins, cereal for breakfast, cold cut sandwich for lunch, protein for dinner. protein shakes at times.

## 2021-12-01 NOTE — DISCHARGE NOTE NURSING/CASE MANAGEMENT/SOCIAL WORK - PATIENT PORTAL LINK FT
You can access the FollowMyHealth Patient Portal offered by [facility] by registering at the following website: [email or web address]. By joining [facility]’s FollowMyHealth portal, you will also be able to view your health information using other applications (apps) compatible with our system.

## 2021-12-01 NOTE — PROGRESS NOTE ADULT - PROVIDER SPECIALTY LIST ADULT
Cardiology
Geriatrics
Cardiology
Geriatrics
Cardiology
Geriatrics

## 2022-09-18 ENCOUNTER — NON-APPOINTMENT (OUTPATIENT)
Age: 87
End: 2022-09-18

## 2023-01-30 ENCOUNTER — INPATIENT (INPATIENT)
Facility: HOSPITAL | Age: 88
LOS: 4 days | Discharge: HOME | DRG: 292 | End: 2023-02-04
Attending: FAMILY MEDICINE | Admitting: FAMILY MEDICINE
Payer: MEDICARE

## 2023-01-30 VITALS
TEMPERATURE: 98 F | RESPIRATION RATE: 16 BRPM | SYSTOLIC BLOOD PRESSURE: 159 MMHG | HEIGHT: 60 IN | DIASTOLIC BLOOD PRESSURE: 97 MMHG | HEART RATE: 125 BPM | OXYGEN SATURATION: 97 % | WEIGHT: 119.93 LBS

## 2023-01-30 DIAGNOSIS — I50.9 HEART FAILURE, UNSPECIFIED: ICD-10-CM

## 2023-01-30 DIAGNOSIS — Z98.890 OTHER SPECIFIED POSTPROCEDURAL STATES: Chronic | ICD-10-CM

## 2023-01-30 PROBLEM — I48.91 UNSPECIFIED ATRIAL FIBRILLATION: Chronic | Status: ACTIVE | Noted: 2021-11-21

## 2023-01-30 PROBLEM — J45.909 UNSPECIFIED ASTHMA, UNCOMPLICATED: Chronic | Status: ACTIVE | Noted: 2021-11-21

## 2023-01-30 LAB
ALBUMIN SERPL ELPH-MCNC: 3.7 G/DL — SIGNIFICANT CHANGE UP (ref 3.3–5)
ALP SERPL-CCNC: 77 U/L — SIGNIFICANT CHANGE UP (ref 40–120)
ALT FLD-CCNC: 15 U/L — SIGNIFICANT CHANGE UP (ref 10–45)
ANION GAP SERPL CALC-SCNC: 12 MMOL/L — SIGNIFICANT CHANGE UP (ref 5–17)
APPEARANCE UR: CLEAR — SIGNIFICANT CHANGE UP
AST SERPL-CCNC: 19 U/L — SIGNIFICANT CHANGE UP (ref 10–40)
BACTERIA # UR AUTO: NEGATIVE — SIGNIFICANT CHANGE UP
BASOPHILS # BLD AUTO: 0.03 K/UL — SIGNIFICANT CHANGE UP (ref 0–0.2)
BASOPHILS NFR BLD AUTO: 0.5 % — SIGNIFICANT CHANGE UP (ref 0–2)
BILIRUB SERPL-MCNC: 0.9 MG/DL — SIGNIFICANT CHANGE UP (ref 0.2–1.2)
BILIRUB UR-MCNC: NEGATIVE — SIGNIFICANT CHANGE UP
BUN SERPL-MCNC: 22 MG/DL — SIGNIFICANT CHANGE UP (ref 7–23)
CALCIUM SERPL-MCNC: 9.3 MG/DL — SIGNIFICANT CHANGE UP (ref 8.4–10.5)
CHLORIDE SERPL-SCNC: 107 MMOL/L — SIGNIFICANT CHANGE UP (ref 96–108)
CO2 SERPL-SCNC: 26 MMOL/L — SIGNIFICANT CHANGE UP (ref 22–31)
COLOR SPEC: YELLOW — SIGNIFICANT CHANGE UP
CREAT SERPL-MCNC: 0.52 MG/DL — SIGNIFICANT CHANGE UP (ref 0.5–1.3)
DIFF PNL FLD: NEGATIVE — SIGNIFICANT CHANGE UP
EGFR: 90 ML/MIN/1.73M2 — SIGNIFICANT CHANGE UP
EOSINOPHIL # BLD AUTO: 0.05 K/UL — SIGNIFICANT CHANGE UP (ref 0–0.5)
EOSINOPHIL NFR BLD AUTO: 0.8 % — SIGNIFICANT CHANGE UP (ref 0–6)
EPI CELLS # UR: 2 /HPF — SIGNIFICANT CHANGE UP
FLUAV AG NPH QL: SIGNIFICANT CHANGE UP
FLUBV AG NPH QL: SIGNIFICANT CHANGE UP
GLUCOSE SERPL-MCNC: 93 MG/DL — SIGNIFICANT CHANGE UP (ref 70–99)
GLUCOSE UR QL: NEGATIVE — SIGNIFICANT CHANGE UP
HCT VFR BLD CALC: 36 % — SIGNIFICANT CHANGE UP (ref 34.5–45)
HGB BLD-MCNC: 11.5 G/DL — SIGNIFICANT CHANGE UP (ref 11.5–15.5)
HYALINE CASTS # UR AUTO: 18 /LPF — HIGH (ref 0–2)
IMM GRANULOCYTES NFR BLD AUTO: 0.6 % — SIGNIFICANT CHANGE UP (ref 0–0.9)
KETONES UR-MCNC: ABNORMAL
LEUKOCYTE ESTERASE UR-ACNC: NEGATIVE — SIGNIFICANT CHANGE UP
LYMPHOCYTES # BLD AUTO: 0.99 K/UL — LOW (ref 1–3.3)
LYMPHOCYTES # BLD AUTO: 15.1 % — SIGNIFICANT CHANGE UP (ref 13–44)
MCHC RBC-ENTMCNC: 30.9 PG — SIGNIFICANT CHANGE UP (ref 27–34)
MCHC RBC-ENTMCNC: 31.9 GM/DL — LOW (ref 32–36)
MCV RBC AUTO: 96.8 FL — SIGNIFICANT CHANGE UP (ref 80–100)
MONOCYTES # BLD AUTO: 0.75 K/UL — SIGNIFICANT CHANGE UP (ref 0–0.9)
MONOCYTES NFR BLD AUTO: 11.4 % — SIGNIFICANT CHANGE UP (ref 2–14)
NEUTROPHILS # BLD AUTO: 4.7 K/UL — SIGNIFICANT CHANGE UP (ref 1.8–7.4)
NEUTROPHILS NFR BLD AUTO: 71.6 % — SIGNIFICANT CHANGE UP (ref 43–77)
NITRITE UR-MCNC: NEGATIVE — SIGNIFICANT CHANGE UP
NRBC # BLD: 0 /100 WBCS — SIGNIFICANT CHANGE UP (ref 0–0)
NT-PROBNP SERPL-SCNC: 2860 PG/ML — HIGH (ref 0–300)
PH UR: 6.5 — SIGNIFICANT CHANGE UP (ref 5–8)
PLATELET # BLD AUTO: 245 K/UL — SIGNIFICANT CHANGE UP (ref 150–400)
POTASSIUM SERPL-MCNC: 3.3 MMOL/L — LOW (ref 3.5–5.3)
POTASSIUM SERPL-SCNC: 3.3 MMOL/L — LOW (ref 3.5–5.3)
PROT SERPL-MCNC: 6.6 G/DL — SIGNIFICANT CHANGE UP (ref 6–8.3)
PROT UR-MCNC: ABNORMAL
RBC # BLD: 3.72 M/UL — LOW (ref 3.8–5.2)
RBC # FLD: 17.8 % — HIGH (ref 10.3–14.5)
RBC CASTS # UR COMP ASSIST: 4 /HPF — SIGNIFICANT CHANGE UP (ref 0–4)
RSV RNA NPH QL NAA+NON-PROBE: SIGNIFICANT CHANGE UP
SARS-COV-2 RNA SPEC QL NAA+PROBE: SIGNIFICANT CHANGE UP
SODIUM SERPL-SCNC: 145 MMOL/L — SIGNIFICANT CHANGE UP (ref 135–145)
SP GR SPEC: >1.05 (ref 1.01–1.02)
TROPONIN T, HIGH SENSITIVITY RESULT: 22 NG/L — SIGNIFICANT CHANGE UP (ref 0–51)
TROPONIN T, HIGH SENSITIVITY RESULT: 23 NG/L — SIGNIFICANT CHANGE UP (ref 0–51)
UROBILINOGEN FLD QL: ABNORMAL
WBC # BLD: 6.56 K/UL — SIGNIFICANT CHANGE UP (ref 3.8–10.5)
WBC # FLD AUTO: 6.56 K/UL — SIGNIFICANT CHANGE UP (ref 3.8–10.5)
WBC UR QL: 11 /HPF — HIGH (ref 0–5)

## 2023-01-30 PROCEDURE — 71275 CT ANGIOGRAPHY CHEST: CPT | Mod: 26,MA

## 2023-01-30 PROCEDURE — 99285 EMERGENCY DEPT VISIT HI MDM: CPT

## 2023-01-30 RX ORDER — ACETAMINOPHEN 500 MG
650 TABLET ORAL EVERY 6 HOURS
Refills: 0 | Status: DISCONTINUED | OUTPATIENT
Start: 2023-01-30 | End: 2023-02-04

## 2023-01-30 RX ORDER — PREGABALIN 225 MG/1
1000 CAPSULE ORAL DAILY
Refills: 0 | Status: DISCONTINUED | OUTPATIENT
Start: 2023-01-30 | End: 2023-02-04

## 2023-01-30 RX ORDER — FUROSEMIDE 40 MG
20 TABLET ORAL ONCE
Refills: 0 | Status: COMPLETED | OUTPATIENT
Start: 2023-01-30 | End: 2023-01-30

## 2023-01-30 RX ORDER — FUROSEMIDE 40 MG
20 TABLET ORAL DAILY
Refills: 0 | Status: DISCONTINUED | OUTPATIENT
Start: 2023-01-30 | End: 2023-02-02

## 2023-01-30 RX ORDER — METOPROLOL TARTRATE 50 MG
25 TABLET ORAL DAILY
Refills: 0 | Status: DISCONTINUED | OUTPATIENT
Start: 2023-01-30 | End: 2023-02-04

## 2023-01-30 RX ORDER — LIDOCAINE 4 G/100G
1 CREAM TOPICAL ONCE
Refills: 0 | Status: COMPLETED | OUTPATIENT
Start: 2023-01-30 | End: 2023-01-30

## 2023-01-30 RX ORDER — POTASSIUM CHLORIDE 20 MEQ
40 PACKET (EA) ORAL ONCE
Refills: 0 | Status: COMPLETED | OUTPATIENT
Start: 2023-01-30 | End: 2023-01-30

## 2023-01-30 RX ORDER — APIXABAN 2.5 MG/1
2.5 TABLET, FILM COATED ORAL EVERY 12 HOURS
Refills: 0 | Status: DISCONTINUED | OUTPATIENT
Start: 2023-01-30 | End: 2023-02-04

## 2023-01-30 RX ORDER — ACETAMINOPHEN 500 MG
975 TABLET ORAL ONCE
Refills: 0 | Status: COMPLETED | OUTPATIENT
Start: 2023-01-30 | End: 2023-01-30

## 2023-01-30 RX ADMIN — Medication 40 MILLIEQUIVALENT(S): at 17:18

## 2023-01-30 RX ADMIN — LIDOCAINE 1 PATCH: 4 CREAM TOPICAL at 11:49

## 2023-01-30 RX ADMIN — APIXABAN 2.5 MILLIGRAM(S): 2.5 TABLET, FILM COATED ORAL at 18:06

## 2023-01-30 RX ADMIN — Medication 20 MILLIGRAM(S): at 17:19

## 2023-01-30 RX ADMIN — Medication 975 MILLIGRAM(S): at 18:44

## 2023-01-30 RX ADMIN — Medication 975 MILLIGRAM(S): at 17:19

## 2023-01-30 NOTE — H&P ADULT - NSHPSOURCEINFORD_GEN_ALL_CORE
Chart(s)/Patient/Physician/Provider/Other Healthcare Facility Pt was minimally ambulatory prior required extensive assistance with ADLs.  Pt spouse reports difficulty caring for pt at home.  High risk for skin failure. Given patient's baseline functional status and protracted hospitalization, patient is at high risk for further deconditioning.  Such deconditioning can worsen her ECOG and limit the benefit of tumor directed treatment.   Encouraged Pt and movement as tolerated.      Supportive care  Encouraged OOB to chair as tolerated    Pt recommending JANETTE.

## 2023-01-30 NOTE — ED PROVIDER NOTE - ATTENDING CONTRIBUTION TO CARE
I, Karlos Chen, performed a history and physical exam of the patient and discussed their management with the resident and/or advanced care provider. I reviewed the resident and/or advanced care provider's note and agree with the documented findings and plan of care. I was present and available for all procedures.    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 Daughter at bedside to help with history. Patient had 3+ pitting edema in BLE last week, has since resolved. Also has been less mobile recently due to worsening DENSON. No hx COPD or other pulmonary condition, no hx CHF. Denies fevers, urinary symptoms, abdominal pain, nvd.    Well appearing and in NAD, head normal appearing atraumatic, trachea midline, no respiratory distress, lungs cta bilaterally, rrr no murmurs, soft NT ND abdomen, no visible extremity deformities, Alert and oriented, non focal neuro exam, skin warm and dry, normal affect and mood. no leg swelling calf ttp no ctl spine midline ttp     shortness of breath w concern for chf vs pe unlikely acs ptx pna dissection eval with labs cta trop ekg close monitoring diuresis if cta neg discussed with patient and daughter agreed w plan for admission for further mgmt

## 2023-01-30 NOTE — CONSULT NOTE ADULT - SUBJECTIVE AND OBJECTIVE BOX
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 Daughter at bedside to help with history. Patient had 3+ pitting edema in BLE last week, has since resolved. Also has been less mobile recently due to worsening DENSON. No hx COPD or other pulmonary condition, no hx CHF. Denies fevers, urinary symptoms, abdominal pain, nvd.    PAST MEDICAL & SURGICAL HISTORY:  Atrial fibrillation      Asthma      History of repair of mitral valve          MEDICATIONS  (STANDING):    MEDICATIONS  (PRN):      FAMILY HISTORY:      SOCIAL HISTORY:    [ ] Non-smoker  [ ] Smoker  [ ] Alcohol    Allergies    penicillin (Unknown)    Intolerances    	    REVIEW OF SYSTEMS:  CONSTITUTIONAL: No fever, weight loss, or fatigue  EYES: No eye pain, visual disturbances, or discharge  ENT:  No difficulty hearing, tinnitus, vertigo; No sinus or throat pain  NECK: No pain or stiffness  RESPIRATORY: No cough, wheezing, chills or hemoptysis; No Shortness of Breath  CARDIOVASCULAR: No chest pain, palpitations, passing out, dizziness, or leg swelling  GASTROINTESTINAL: No abdominal or epigastric pain. No nausea, vomiting, or hematemesis; No diarrhea or constipation. No melena or hematochezia.  GENITOURINARY: No dysuria, frequency, hematuria, or incontinence  NEUROLOGICAL: No headaches, memory loss, loss of strength, numbness, or tremors  SKIN: No itching, burning, rashes, or lesions   LYMPH Nodes: No enlarged glands  ENDOCRINE: No heat or cold intolerance; No hair loss  MUSCULOSKELETAL: No joint pain or swelling; No muscle, back, or extremity pain  PSYCHIATRIC: No depression, anxiety, mood swings, or difficulty sleeping  HEME/LYMPH: No easy bruising, or bleeding gums  ALLERGY AND IMMUNOLOGIC: No hives or eczema	    [ ] All others negative	  [ ] Unable to obtain    PHYSICAL EXAM:  T(C): 36.7 (01-30-23 @ 10:33), Max: 36.7 (01-30-23 @ 10:33)  HR: 86 (01-30-23 @ 10:57) (86 - 125)  BP: 146/121 (01-30-23 @ 10:57) (146/121 - 159/97)  RR: 20 (01-30-23 @ 10:58) (16 - 20)  SpO2: 97% (01-30-23 @ 10:58) (92% - 97%)  Wt(kg): --  I&O's Summary      Appearance: Normal	  HEENT:   Normal oral mucosa, PERRL, EOMI	  Lymphatic: No lymphadenopathy  Cardiovascular: Normal S1 S2, No JVD, No murmurs, No edema  Respiratory: Lungs clear to auscultation	  Psychiatry: A & O x 3, Mood & affect appropriate  Gastrointestinal:  Soft, Non-tender, + BS	  Skin: No rashes, No ecchymoses, No cyanosis	  Neurologic: Non-focal  Extremities: Normal range of motion, No clubbing, cyanosis or edema  Vascular: Peripheral pulses palpable 2+ bilaterally    TELEMETRY: 	    ECG:  	  RADIOLOGY:  OTHER: 	  	  LABS:	 	    CARDIAC MARKERS:                              11.5   6.56  )-----------( 245      ( 30 Jan 2023 12:16 )             36.0           proBNP:   Lipid Profile:   HgA1c:   TSH:       PREVIOUS DIAGNOSTIC TESTING:    [ ] Echocardiogram:  [ ]  Catheterization:  [ ] Stress Test:         CHIEF COMPLAINT:Patient is a 87y old  Female who presents with a chief complaint of     HPI:  Patient is an 87y F PMHx dementia, Afib (on eliquis, metoprolol), 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 Daughter at bedside to help with history. Patient had 3+ pitting edema in BLE last week, has since resolved. Also has been less mobile recently due to worsening DENSON. No hx COPD or other pulmonary condition, no hx CHF. Denies fevers, urinary symptoms, abdominal pain, nvd.    PAST MEDICAL & SURGICAL HISTORY:  Atrial fibrillation      Asthma      History of repair of mitral valve    MEDICATIONS  (STANDING):  	acetaminophen 500 mg oral tablet: 2 tab(s) orally 3 times a day   · 	Multiple Vitamins oral tablet: 1 tab(s) orally once a day  · 	metoprolol succinate 25 mg oral tablet, extended release: 1 tab(s) orally once a day  · 	divalproex sodium 500 mg oral tablet, extended release: 1 tab(s) orally once a day  · 	Eliquis 2.5 mg oral tablet: 1 tab(s) orally 2 times a day    MEDICATIONS  (PRN):    FAMILY HISTORY:      SOCIAL HISTORY:    [ x] Non-smoker  [ ] Smoker  [ ] Alcohol    Allergies    penicillin (Unknown)    Intolerances    	    REVIEW OF SYSTEMS:  CONSTITUTIONAL: No fever, weight loss, or fatigue  EYES: No eye pain, visual disturbances, or discharge  ENT:  No difficulty hearing, tinnitus, vertigo; No sinus or throat pain  NECK: No pain or stiffness  RESPIRATORY: No cough, wheezing, chills or hemoptysis; + Shortness of Breath  CARDIOVASCULAR: No chest pain, palpitations, passing out, dizziness, or leg swelling  GASTROINTESTINAL: No abdominal or epigastric pain. No nausea, vomiting, or hematemesis; No diarrhea or constipation. No melena or hematochezia.  GENITOURINARY: No dysuria, frequency, hematuria, or incontinence  NEUROLOGICAL: No headaches, memory loss, loss of strength, numbness, or tremors  SKIN: No itching, burning, rashes, or lesions   LYMPH Nodes: No enlarged glands  ENDOCRINE: No heat or cold intolerance; No hair loss  MUSCULOSKELETAL: No joint pain or swelling; No muscle, back, or extremity pain  PSYCHIATRIC: No depression, anxiety, mood swings, or difficulty sleeping  HEME/LYMPH: No easy bruising, or bleeding gums  ALLERGY AND IMMUNOLOGIC: No hives or eczema	    [ ] All others negative	  [x ] Unable to obtain    PHYSICAL EXAM:  T(C): 36.7 (01-30-23 @ 10:33), Max: 36.7 (01-30-23 @ 10:33)  HR: 86 (01-30-23 @ 10:57) (86 - 125)  BP: 146/121 (01-30-23 @ 10:57) (146/121 - 159/97)  RR: 20 (01-30-23 @ 10:58) (16 - 20)  SpO2: 97% (01-30-23 @ 10:58) (92% - 97%)  Wt(kg): --  I&O's Summary      Appearance: Normal	  HEENT:   Normal oral mucosa, PERRL, EOMI	  Lymphatic: No lymphadenopathy  Cardiovascular: Normal S1 S2, No JVD, + murmurs, No edema  Respiratory: rhonchi  Gastrointestinal:  Soft, Non-tender, + BS	  Skin: No rashes, No ecchymoses, No cyanosis	  Neurologic: Non-focal  Extremities: Normal range of motion, No clubbing, cyanosis or edema  Vascular: Peripheral pulses palpable 2+ bilaterally    TELEMETRY: 	    ECG:  	  RADIOLOGY:  OTHER: 	  	  LABS:	 	    CARDIAC MARKERS:                              11.5   6.56  )-----------( 245      ( 30 Jan 2023 12:16 )             36.0         Troponin T, High Sensitivity (01.30.23 @ 16:06)    Troponin T, High Sensitivity Result: 23: Specimen not hemolyzed  *  *  Rapid upward or downward changes in high-sensitivity troponin levels  suggest acute myocardial injury. Renal impairment may cause sustained  troponin elevations.  Normal: <6 - 14 ng/L  Indeterminate: 15-51 ng/L  Elevated: > 51 ng/L  See [email or web address]/test/[email or web address] on the [facility] intranet for more  information ng/L      proBNP:   Lipid Profile:   HgA1c:   TSH:       PREVIOUS DIAGNOSTIC TESTING:    < from: 12 Lead ECG (11.23.21 @ 19:38) >  Diagnosis Line SUPRAVENTRICULAR TACHYCARDIA WITH FREQUENT PREMATURE VENTRICULAR COMPLEXES  NONSPECIFIC ST AND T WAVE ABNORMALITY  ABNORMAL ECG  WHEN COMPARED WITH ECG OF 11/23/2021  NO SIGNIFICANT CHANGE WAS FOUND    < from: CT Angio Chest PE Protocol w/ IV Cont (01.30.23 @ 14:44) >  No pulmonary embolism is visualized. Limited evaluation of several   subsegmental branches.    Four chambercardiomegaly.    Small right pleural effusion.    Multiple age indeterminate compression deformities of the thoracic   vertebra, including a severe compression fracture of T12.      < from: Xray Chest 1 View- PORTABLE-Urgent (Xray Chest 1 View- PORTABLE-Urgent .) (11.25.21 @ 10:31) >  There is slight cardiomegaly.  S/P sternotomy.  Linear atelectasis seen in the lower left lung. No focal consolidations. No large pleural effusion. No pneumothorax.  Moderate thoracic scoliosis. Degenerative changes of the visualized spine.      Troponin T, High Sensitivity (01.30.23 @ 12:16)    Troponin T, High Sensitivity Result: 22: Specimen not hemolyzed  *  *  Rapid upward or downward changes in high-sensitivity troponin levels  suggest acute myocardial injury. Renal impairment may cause sustained  troponin elevations.  Normal: <6 - 14 ng/L  Indeterminate: 15-51 ng/L  Elevated: > 51 ng/L  See [email or web address]/test/[email or web address] on the [facility] intranet for more  information ng/L

## 2023-01-30 NOTE — ED ADULT NURSE NOTE - DISTAL EXTREMITY COLOR
"Anesthesia Transfer of Care Note    Patient: Darwin Fletcher    Procedure(s) Performed: Procedure(s) (LRB):  Block-nerve-medial branch-lumbar (N/A)    Patient location: OPS    Anesthesia Type: general    Transport from OR: Transported from OR on room air with adequate spontaneous ventilation    Post pain: adequate analgesia    Post assessment: no apparent anesthetic complications    Post vital signs: stable    Level of consciousness: awake, alert and oriented    Nausea/Vomiting: no nausea/vomiting    Complications: none    Transfer of care protocol was followed      Last vitals:   Visit Vitals  BP (!) 153/80   Pulse 64   Temp 36.4 °C (97.5 °F) (Tympanic)   Resp 20   Ht 5' 9" (1.753 m)   Wt 106.6 kg (235 lb 0.2 oz)   SpO2 97%   BMI 34.71 kg/m²     " color consistent with ethnicity/race

## 2023-01-30 NOTE — ED PROVIDER NOTE - CLINICAL SUMMARY MEDICAL DECISION MAKING FREE TEXT BOX
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  Concern for PE at this time, sat in room 88% on RA, recovers with 2-3L NC. No acute respiratory distress. Will get CTPA. Also r/o ACS v new onset HF. Patient with Mitral valve replacement. No murmur on exam. Afib without RVR at this time. No BLE edema or crackles on exam, making HF less likely but does have hx BLE edema recently. Left scapular pain reproducible on exam with tenderness to left upper back. Will give lidocaine patch, reassess. 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  Concern for PE at this time, sat in room 88% on RA, recovers with 2-3L NC. No acute respiratory distress. Will get CTPA. Also r/o ACS v new onset HF. Patient with Mitral valve replacement. No murmur on exam. Afib without RVR at this time. No BLE edema or crackles on exam, making HF less likely but does have hx BLE edema recently. Left scapular pain reproducible on exam with tenderness to left upper back. Will give lidocaine patch, reassess. 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  Concern for PE at this time, sat in room 88% on RA, recovers with 2-3L NC. No acute respiratory distress. Will get CTPA. Also r/o ACS v new onset HF. Patient with Mitral valve replacement. No murmur on exam. Afib without RVR at this time. No BLE edema or crackles on exam, making HF less likely but does have hx BLE edema recently. Left scapular pain reproducible on exam with tenderness to left upper back. Will give lidocaine patch, reassess.    pettet attending- see attending attestation for my mdm

## 2023-01-30 NOTE — H&P ADULT - NSHPSOCIALHISTORY_GEN_ALL_CORE
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

## 2023-01-30 NOTE — CONSULT NOTE ADULT - ASSESSMENT
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 Daughter at bedside to help with history. Patient had 3+ pitting edema in BLE last week, has since resolved. Also has been less mobile recently due to worsening DENSON. No hx COPD or other pulmonary condition, no hx CHF. Denies fevers, urinary symptoms, abdominal pain, nvd.  pt with hx of dementia, htn, a,fib on AC with c/o chest pain and sob  CTA of chest noted with no PE with small r sided effusion  ?chf / hfpef  continue all cardiac meds  trop  are all negative  echo

## 2023-01-30 NOTE — ED PROVIDER NOTE - PROGRESS NOTE DETAILS
Julia Luo MD PGY2: cardiomegaly, increased pro-BNP, right pleural effusion, will admit for HF, cardiac w/u. Dr. Dewitt to admit.

## 2023-01-30 NOTE — H&P ADULT - ASSESSMENT
adm for CHF, sob , raLES, GOT LASIX  af - ON ELIQUIS  hX mvr - ECHO ORDERED  ashd htn - SEE MEDS  hld - FU  hYPOKALEMIA - NEEDS FU  hX oa djd KNees  Hx B12 Df - not anemic  allergic PCN

## 2023-01-30 NOTE — ED PROVIDER NOTE - OBJECTIVE STATEMENT
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 Daughter at bedside to help with history. Patient had 3+ pitting edema in BLE last week, has since resolved. Also has been less mobile recently due to worsening DENSON. No hx COPD or other pulmonary condition, no hx CHF. Denies fevers, urinary symptoms, abdominal pain, nvd.

## 2023-01-30 NOTE — H&P ADULT - NSHPPHYSICALEXAM_GEN_ALL_CORE
vss now not sob no pain  ncat, natalie cwnl mmm speaks but confused swallows ok hears ok  no cb no tm  lungs: rales R base L clera  heart irrg irreg m  abd flat BS+ soft nt no g   extrem no cce9If she had eedema it is gone)  skin CDI  neuro: speech clear / affect approp/ Memeporr/ Judgement often poor needs direction/ oriented to hosp but easily con fused/ cognition is imp[aired by mem loss, sens gr nl, motor nf atmae in bed / gait able pta indep

## 2023-01-30 NOTE — H&P ADULT - HISTORY OF PRESENT ILLNESS
Hx HN, MVR, AF, CHF has been stable at [facility], until today co sob and chest pain sent to ed, trop ok, see cxr CTA neg, See Cardio, disc c Dr Beckford  pt is demented c no ST Mem cannot statre ab=ny details related to admission and feels fine now after dose lasix asking for  to be called(ret MD who is )  no cp no not sob

## 2023-01-30 NOTE — ED PROVIDER NOTE - PHYSICAL EXAMINATION
GENERAL: no acute distress, non-toxic appearing  HEAD: normocephalic, atraumatic  HEENT: PERRLA, EOMI, normal conjunctiva  CARDIAC: irregular rhythm, normal rate  PULM: clear to ascultation bilaterally, no crackles, rales, rhonchi, or wheezing  GI: abdomen nondistended, soft, nontender, no guarding or rebound tenderness  : no CVA tenderness, no suprapubic tenderness  NEURO: alert and oriented x 3, normal speech, no gross neurologic deficit  MSK: no visible deformities, no peripheral edema, calf tenderness/redness/swelling  SKIN: no visible rashes, dry, well-perfused  PSYCH: appropriate mood and affect

## 2023-01-30 NOTE — ED ADULT NURSE NOTE - OBJECTIVE STATEMENT
First encounter with the pt, pt received from triage with complaints of weakness and hypoxia. EMS reported that she was hypoxia of a PaO2 on room air was 89%. EMS gave the pt oxygen via nasal cannula at 4 liters.   Pt is a/ox2 and verbal. Speech is clear and able to speak in full sentences without distress. Airway is patent with no obstruction nor blocking secretions. Breathing is even and unlabored on room air with PaO2 92-94%. Pt has strong pulses palpated bilaterally. Pt is SR on the cardiac monitor. Neuro check is wnl. Full rom but requires assistance. Pt denies chest pain, n/v and sob. No acute distress noted. Pt has call light within the reach of the pt at the bedside. Will continue to monitor the pt.

## 2023-01-30 NOTE — ED ADULT NURSE REASSESSMENT NOTE - NS ED NURSE REASSESS COMMENT FT1
Handoff report received from MAXIM Bob. Pt resting in gold 14. Pt A&O x 1, VSS on 3 L nasal cannula. Pt attempting to get out of bed and continuously removing nasal cannula. NP Ionie Chambers made aware and patient placed on 1:1. Stretcher locked in lowest position, side rails up and call bell in reach.

## 2023-01-31 LAB
CULTURE RESULTS: SIGNIFICANT CHANGE UP
SPECIMEN SOURCE: SIGNIFICANT CHANGE UP

## 2023-01-31 PROCEDURE — 93306 TTE W/DOPPLER COMPLETE: CPT | Mod: 26

## 2023-01-31 RX ADMIN — Medication 1 TABLET(S): at 11:37

## 2023-01-31 RX ADMIN — Medication 25 MILLIGRAM(S): at 05:21

## 2023-01-31 RX ADMIN — APIXABAN 2.5 MILLIGRAM(S): 2.5 TABLET, FILM COATED ORAL at 18:33

## 2023-01-31 RX ADMIN — Medication 20 MILLIGRAM(S): at 05:21

## 2023-01-31 RX ADMIN — PREGABALIN 1000 MICROGRAM(S): 225 CAPSULE ORAL at 11:36

## 2023-01-31 RX ADMIN — APIXABAN 2.5 MILLIGRAM(S): 2.5 TABLET, FILM COATED ORAL at 05:21

## 2023-01-31 RX ADMIN — Medication 650 MILLIGRAM(S): at 13:03

## 2023-01-31 NOTE — PHYSICAL THERAPY INITIAL EVALUATION ADULT - ADDITIONAL COMMENTS
Pt is a poor historian; history obtained from daughter Silvia over the phone. Pt lives in skilled nursing, mostly independent with functional mobility with RW, requires assistance for all ADL.

## 2023-01-31 NOTE — PATIENT PROFILE ADULT - FALL HARM RISK - HARM RISK INTERVENTIONS
Assistance with ambulation/Assistance OOB with selected safe patient handling equipment/Communicate Risk of Fall with Harm to all staff/Monitor for mental status changes/Monitor gait and stability/Move patient closer to nurses' station/Reinforce activity limits and safety measures with patient and family/Reorient to person, place and time as needed/Review medications for side effects contributing to fall risk/Sit up slowly, dangle for a short time, stand at bedside before walking/Tailored Fall Risk Interventions/Toileting schedule using arm’s reach rule for commode and bathroom/Use of alarms - bed, chair and/or voice tab/Visual Cue: Yellow wristband and red socks/Bed in lowest position, wheels locked, appropriate side rails in place/Call bell, personal items and telephone in reach/Instruct patient to call for assistance before getting out of bed or chair/Non-slip footwear when patient is out of bed/[place] to call system/Physically safe environment - no spills, clutter or unnecessary equipment/Purposeful Proactive Rounding/Room/bathroom lighting operational, light cord in reach

## 2023-01-31 NOTE — PROGRESS NOTE ADULT - SUBJECTIVE AND OBJECTIVE BOX
DATE OF SERVICE: 23 @ 09:30    HPI:  Hx HN, MVR, AF, CHF has been stable at nursing home, until today co sob and chest pain sent to ed, trop ok, see cxr CTA neg, See Cardio, disc c Dr Beckford  pt is demented c no ST Mem cannot statre ab=ny details related to admission and feels fine now after dose lasix asking for  to be called(ret MD who is )  no cp no not sob (2023 18:44)      Interval Events  Still in ED, Gold>Orange. Alert nad,converses, demented, confabulates about   (MD [facility]) nad no co  disc c Dtr, Dr Beckford ands POCA and pt, echo planned then fu re CHF tho she is better, O2sat 96 not sob and denies pain - doesnt remember  confused about the hospital. Remains off depakote, long time no agitation    MEDICATIONS  (STANDING):  apixaban 2.5 milliGRAM(s) Oral every 12 hours  cyanocobalamin 1000 MICROGram(s) Oral daily  furosemide   Injectable 20 milliGRAM(s) IV Push daily  metoprolol succinate ER 25 milliGRAM(s) Oral daily  multivitamin 1 Tablet(s) Oral daily    MEDICATIONS  (PRN):  acetaminophen     Tablet .. 650 milliGRAM(s) Oral every 6 hours PRN Temp greater or equal to 38C (100.4F), Mild Pain (1 - 3)      Patient is a 87y old  Female who presents with a chief complaint of chest pain sob (2023 08:13)      REVIEW OF SYSTEMS    General:nad no co  Skin/Breast:  Ophthalmologic:  ENMT:	hears speaks swallows ok no co  Respiratory and Thorax:no cough no sp no sob  Cardiovascular:no cp no palkp  Gastrointestinal:no nvcd  Genitourinary:no fdi  Musculoskeletal:	no a no p  Neurological:	no co no ch  Psychiatric:	  Hematology/Lymphatics:	  Endocrine:no poltudd	  Allergic/Immunologic:  AOSN	y      Vital Signs Last 24 Hrs  T(C): 36.8 (2023 04:30), Max: 37 (2023 21:55)  T(F): 98.2 (2023 04:30), Max: 98.6 (2023 21:55)  HR: 104 (2023 04:30) (71 - 125)  BP: 116/76 (2023 04:30) (101/69 - 159/97)  BP(mean): --  RR: 18 (2023 04:30) (15 - 20)  SpO2: 92% (2023 04:30) (92% - 100%)    Parameters below as of 2023 04:30  Patient On (Oxygen Delivery Method): room air        PHYSICAL EXAM:    Constitutional:calm coop nad O2SAt 96  H+N ncat  Eyes:natalie cwnl  ENMT:hears speaks swallows eats ok mmm  Neck:no cb no tm  Breasts:  Back:  Respiratory:ctab no rrw, better than yesterday  Cardiovascular:irreg irreg, m  Gastrointestinal:soft nt sl tdist tymp, no g nad  Genitourinary:no ritchie  Rectal:  Extremities:no cce  Vascular:hm- vv-  Neurological:nfatmae in bed  was amb indep pta, demented confabulates mem poor  Skin: cdi pale  Lymph Nodes:  Musculoskeletal:  Psychiatric:calm coop    LABS  CBC Full  -  ( 2023 12:16 )  WBC Count : 6.56 K/uL  RBC Count : 3.72 M/uL  Hemoglobin : 11.5 g/dL  Hematocrit : 36.0 %  Platelet Count - Automated : 245 K/uL  Mean Cell Volume : 96.8 fl  Mean Cell Hemoglobin : 30.9 pg  Mean Cell Hemoglobin Concentration : 31.9 gm/dL  Auto Neutrophil # : 4.70 K/uL  Auto Lymphocyte # : 0.99 K/uL  Auto Monocyte # : 0.75 K/uL  Auto Eosinophil # : 0.05 K/uL  Auto Basophil # : 0.03 K/uL  Auto Neutrophil % : 71.6 %  Auto Lymphocyte % : 15.1 %  Auto Monocyte % : 11.4 %  Auto Eosinophil % : 0.8 %  Auto Basophil % : 0.5 %          145  |  107  |  22  ----------------------------<  93  3.3<L>   |  26  |  0.52    Ca    9.3      2023 12:16    TPro  6.6  /  Alb  3.7  /  TBili  0.9  /  DBili  x   /  AST  19  /  ALT  15  /  AlkPhos  77            Imaging:    Xray:    Echo:    CT:    MRI:    Tele:    Orders:    BRUNO Dewitt [phone]

## 2023-01-31 NOTE — PHYSICAL THERAPY INITIAL EVALUATION ADULT - MANUAL MUSCLE TESTING RESULTS, REHAB EVAL
at least 3/5 strength gross assess due to spinal compress fx T 12 and multiple compress defomities T6, T10, T 11 ;  strength 4/5

## 2023-01-31 NOTE — PHYSICAL THERAPY INITIAL EVALUATION ADULT - ASSISTIVE DEVICE FOR TRANSFER: GAIT, REHAB EVAL
Pt doing very well s/p Vericose Vein Ligation, very little pain, getting around well. No fever chills nausea.    rolling walker

## 2023-01-31 NOTE — PROGRESS NOTE ADULT - ASSESSMENT
lasbs p, echo p, seems better not sob needs to be oob, disc c  G3x, and DTR 2x and pt and pca  chest pain res  Dyspnea res  CHF - echo p getting lasix now wasnt at home, no rales this am better than yesterday  Sm pl eff,   AF  - on ac  Hx MVR  Cardiomegaly  HypoK needs fu  Osteo porosis - back pain gone  SDAT - ST memv poor, confabulkates   ambuklates

## 2023-01-31 NOTE — PHYSICAL THERAPY INITIAL EVALUATION ADULT - PERTINENT HX OF CURRENT PROBLEM, REHAB EVAL
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  Patient had 3+ pitting edema in BLE last week, has since resolved. Also has been less mobile recently due to worsening DENSON. No hx COPD or other pulmonary condition, no hx CHF. 1/30 CTA Chest PE Protocol: No pulmonary embolism is visualized. Limited evaluation of several subsegmental branches. Four chamber cardiomegaly. Small right pleural effusion. Multiple age indeterminate compression deformities of the thoracic vertebra, including a severe compression fracture of T12. 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  Patient had 3+ pitting edema in BLE last week, has since resolved. Also has been less mobile recently due to worsening DENSON. No hx COPD or other pulmonary condition, no hx CHF. 1/30 CTA Chest PE Protocol: No pulmonary embolism is visualized. Limited evaluation of several subsegmental branches. Four chamber cardiomegaly. Small right pleural effusion. Multiple age indeterminate compression deformities of the thoracic vertebra T6, T10, T11, including a severe compression fracture of T12.

## 2023-01-31 NOTE — PROGRESS NOTE ADULT - SUBJECTIVE AND OBJECTIVE BOX
CARDIOLOGY     PROGRESS  NOTE   ________________________________________________    CHIEF COMPLAINT:Patient is a 87y old  Female who presents with a chief complaint of chest pain sob (30 Jan 2023 18:44)  mildly tachypenic  	  REVIEW OF SYSTEMS:  CONSTITUTIONAL: No fever, weight loss, or fatigue  EYES: No eye pain, visual disturbances, or discharge  ENT:  No difficulty hearing, tinnitus, vertigo; No sinus or throat pain  NECK: No pain or stiffness  RESPIRATORY: No cough, wheezing, chills or hemoptysis; + exertional  Shortness of Breath  CARDIOVASCULAR: No chest pain, palpitations, passing out, dizziness, or leg swelling  GASTROINTESTINAL: No abdominal or epigastric pain. No nausea, vomiting, or hematemesis; No diarrhea or constipation. No melena or hematochezia.  GENITOURINARY: No dysuria, frequency, hematuria, or incontinence  NEUROLOGICAL: No headaches, memory loss, loss of strength, numbness, or tremors  SKIN: No itching, burning, rashes, or lesions   LYMPH Nodes: No enlarged glands  ENDOCRINE: No heat or cold intolerance; No hair loss  MUSCULOSKELETAL: No joint pain or swelling; No muscle, back, or extremity pain  PSYCHIATRIC: No depression, anxiety, mood swings, or difficulty sleeping  HEME/LYMPH: No easy bruising, or bleeding gums  ALLERGY AND IMMUNOLOGIC: No hives or eczema	    x[ ] All others negative	  [ ] Unable to obtain    PHYSICAL EXAM:  T(C): 36.8 (01-31-23 @ 04:30), Max: 37 (01-30-23 @ 21:55)  HR: 104 (01-31-23 @ 04:30) (71 - 125)  BP: 116/76 (01-31-23 @ 04:30) (101/69 - 159/97)  RR: 18 (01-31-23 @ 04:30) (15 - 20)  SpO2: 92% (01-31-23 @ 04:30) (92% - 100%)  Wt(kg): --  I&O's Summary      Appearance: Normal	  HEENT:   Normal oral mucosa, PERRL, EOMI	  Lymphatic: No lymphadenopathy  Cardiovascular: Normal S1 S2, No JVD, + murmurs, No edema  Respiratory: rhonchi  Psychiatry: A & O x 3, Mood & affect appropriate  Gastrointestinal:  Soft, Non-tender, + BS	  Skin: No rashes, No ecchymoses, No cyanosis	  Neurologic: Non-focal  Extremities: Normal range of motion, No clubbing, cyanosis or edema  Vascular: Peripheral pulses palpable 2+ bilaterally    MEDICATIONS  (STANDING):  apixaban 2.5 milliGRAM(s) Oral every 12 hours  cyanocobalamin 1000 MICROGram(s) Oral daily  furosemide   Injectable 20 milliGRAM(s) IV Push daily  metoprolol succinate ER 25 milliGRAM(s) Oral daily  multivitamin 1 Tablet(s) Oral daily      TELEMETRY: 	    ECG:  	  RADIOLOGY:  OTHER: 	  	  LABS:	 	    CARDIAC MARKERS:                                11.5   6.56  )-----------( 245      ( 30 Jan 2023 12:16 )             36.0     01-30    145  |  107  |  22  ----------------------------<  93  3.3<L>   |  26  |  0.52    Ca    9.3      30 Jan 2023 12:16    TPro  6.6  /  Alb  3.7  /  TBili  0.9  /  DBili  x   /  AST  19  /  ALT  15  /  AlkPhos  77  01-30    proBNP: Serum Pro-Brain Natriuretic Peptide: 2860 pg/mL (01-30 @ 12:16)    Lipid Profile:   HgA1c:   TSH:     < from: CT Angio Chest PE Protocol w/ IV Cont (01.30.23 @ 14:44) >  No pulmonary embolism is visualized. Limited evaluation of several   subsegmental branches.    Four chambe rcardiomegaly.    Small right pleural effusion.    Multiple age indeterminate compression deformities of the thoracic   vertebra, including a severe compression fracture of T12.    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 Daughter at bedside to help with history. Patient had 3+ pitting edema in BLE last week, has since resolved. Also has been less mobile recently due to worsening DENSON. No hx COPD or other pulmonary condition, no hx CHF. Denies fevers, urinary symptoms, abdominal pain, nvd.  pt with hx of dementia, htn, a,fib on AC with c/o chest pain and sob  CTA of chest noted with no PE with small r sided effusion  ?chf / hfpef  continue all cardiac meds  trop  are all negative  ? also sob can be from hx of asthma, start on inhalers  continue iv Lasix with gentle diuresis  fu lytes  awaiting echo with abnormal cardiomegaly on ct scan yesterday

## 2023-01-31 NOTE — PHYSICAL THERAPY INITIAL EVALUATION ADULT - IMPAIRMENTS FOUND, PT EVAL
aerobic capacity/endurance/cognitive impairment/muscle strength aerobic capacity/endurance/cognitive impairment/gait, locomotion, and balance/muscle strength

## 2023-01-31 NOTE — PHYSICAL THERAPY INITIAL EVALUATION ADULT - LEVEL OF INDEPENDENCE, REHAB EVAL
x 2 , pt report pain when log roll r thorax area 8/10 maria dolores Kim inform/minimum assist (75% patients effort)
(0) Absent

## 2023-01-31 NOTE — PHYSICAL THERAPY INITIAL EVALUATION ADULT - GENERAL OBSERVATIONS, REHAB EVAL
pt received in bed all siderails up HOB 30 degrees call bell, phone and table in reach pt bed in lowest position and 1:1 present in room , pt wear red non skid socks skin intact , Buttock R/L close to cleft [place] blanchable as well as R elbow, dressing r above and below R elbow c, di , log roll x 2 min of 1 placed pillow behind back for some relief of pressure maria dolores Kim inform , painful to turn per pt r flank and thoracic area , skin intact

## 2023-01-31 NOTE — PHYSICAL THERAPY INITIAL EVALUATION ADULT - NSPTDISCHREC_GEN_A_CORE
TBD once functional eval can be completed Assisted living facility with [facility]/ Assist for mobility . I

## 2023-01-31 NOTE — PHYSICAL THERAPY INITIAL EVALUATION ADULT - REFERRING PHYSICIAN, REHAB EVAL
George Dewitt MD ORDER PT EVAl and TREAT , amb with assist ; Noted CTA below re spine, spoke w/ rn Julio held fxl assess for now , await further instructions

## 2023-02-01 LAB
ANION GAP SERPL CALC-SCNC: 11 MMOL/L — SIGNIFICANT CHANGE UP (ref 5–17)
BUN SERPL-MCNC: 24 MG/DL — HIGH (ref 7–23)
CALCIUM SERPL-MCNC: 9.1 MG/DL — SIGNIFICANT CHANGE UP (ref 8.4–10.5)
CHLORIDE SERPL-SCNC: 105 MMOL/L — SIGNIFICANT CHANGE UP (ref 96–108)
CO2 SERPL-SCNC: 25 MMOL/L — SIGNIFICANT CHANGE UP (ref 22–31)
CREAT SERPL-MCNC: 0.47 MG/DL — LOW (ref 0.5–1.3)
EGFR: 92 ML/MIN/1.73M2 — SIGNIFICANT CHANGE UP
GLUCOSE SERPL-MCNC: 96 MG/DL — SIGNIFICANT CHANGE UP (ref 70–99)
NT-PROBNP SERPL-SCNC: 1368 PG/ML — HIGH (ref 0–300)
POTASSIUM SERPL-MCNC: 3.8 MMOL/L — SIGNIFICANT CHANGE UP (ref 3.5–5.3)
POTASSIUM SERPL-SCNC: 3.8 MMOL/L — SIGNIFICANT CHANGE UP (ref 3.5–5.3)
SODIUM SERPL-SCNC: 141 MMOL/L — SIGNIFICANT CHANGE UP (ref 135–145)
TSH SERPL-MCNC: 1.02 UIU/ML — SIGNIFICANT CHANGE UP (ref 0.27–4.2)

## 2023-02-01 RX ORDER — TRAMADOL HYDROCHLORIDE 50 MG/1
25 TABLET ORAL EVERY 6 HOURS
Refills: 0 | Status: DISCONTINUED | OUTPATIENT
Start: 2023-02-01 | End: 2023-02-02

## 2023-02-01 RX ORDER — LOSARTAN POTASSIUM 100 MG/1
25 TABLET, FILM COATED ORAL DAILY
Refills: 0 | Status: DISCONTINUED | OUTPATIENT
Start: 2023-02-01 | End: 2023-02-02

## 2023-02-01 RX ORDER — TRAMADOL HYDROCHLORIDE 50 MG/1
25 TABLET ORAL ONCE
Refills: 0 | Status: DISCONTINUED | OUTPATIENT
Start: 2023-02-01 | End: 2023-02-01

## 2023-02-01 RX ADMIN — APIXABAN 2.5 MILLIGRAM(S): 2.5 TABLET, FILM COATED ORAL at 17:57

## 2023-02-01 RX ADMIN — PREGABALIN 1000 MICROGRAM(S): 225 CAPSULE ORAL at 11:15

## 2023-02-01 RX ADMIN — TRAMADOL HYDROCHLORIDE 25 MILLIGRAM(S): 50 TABLET ORAL at 16:07

## 2023-02-01 RX ADMIN — Medication 1 TABLET(S): at 11:17

## 2023-02-01 RX ADMIN — TRAMADOL HYDROCHLORIDE 25 MILLIGRAM(S): 50 TABLET ORAL at 15:16

## 2023-02-01 RX ADMIN — Medication 20 MILLIGRAM(S): at 05:26

## 2023-02-01 RX ADMIN — APIXABAN 2.5 MILLIGRAM(S): 2.5 TABLET, FILM COATED ORAL at 05:26

## 2023-02-01 RX ADMIN — Medication 25 MILLIGRAM(S): at 05:25

## 2023-02-01 NOTE — CHART NOTE - NSCHARTNOTEFT_GEN_A_CORE
Consulted ortho for age indeterminate T12 compression fracture. As per ortho resident, pt qualifies for TLSO brace. Will contact for delivery.

## 2023-02-01 NOTE — PROGRESS NOTE ADULT - SUBJECTIVE AND OBJECTIVE BOX
CARDIOLOGY     PROGRESS  NOTE   ________________________________________________    CHIEF COMPLAINT:Patient is a 87y old  Female who presents with a chief complaint of chest pain sob (31 Jan 2023 09:30)  doing better  	  REVIEW OF SYSTEMS:  CONSTITUTIONAL: No fever, weight loss, or fatigue  EYES: No eye pain, visual disturbances, or discharge  ENT:  No difficulty hearing, tinnitus, vertigo; No sinus or throat pain  NECK: No pain or stiffness  RESPIRATORY: No cough, wheezing, chills or hemoptysis; decrease  Shortness of Breath  CARDIOVASCULAR: No chest pain, palpitations, passing out, dizziness, or leg swelling  GASTROINTESTINAL: No abdominal or epigastric pain. No nausea, vomiting, or hematemesis; No diarrhea or constipation. No melena or hematochezia.  GENITOURINARY: No dysuria, frequency, hematuria, or incontinence  NEUROLOGICAL: No headaches, memory loss, loss of strength, numbness, or tremors  SKIN: No itching, burning, rashes, or lesions   LYMPH Nodes: No enlarged glands  ENDOCRINE: No heat or cold intolerance; No hair loss  MUSCULOSKELETAL: No joint pain or swelling; No muscle, back, or extremity pain  PSYCHIATRIC: No depression, anxiety, mood swings, or difficulty sleeping  HEME/LYMPH: No easy bruising, or bleeding gums  ALLERGY AND IMMUNOLOGIC: No hives or eczema	    [ ] All others negative	  [ ] Unable to obtain    PHYSICAL EXAM:  T(C): 36.8 (01-31-23 @ 21:03), Max: 36.8 (01-31-23 @ 21:03)  HR: 74 (01-31-23 @ 21:03) (74 - 102)  BP: 143/86 (01-31-23 @ 21:03) (102/70 - 143/86)  RR: 18 (01-31-23 @ 21:03) (17 - 18)  SpO2: 94% (01-31-23 @ 21:03) (94% - 95%)  Wt(kg): --  I&O's Summary      Appearance: Normal	  HEENT:   Normal oral mucosa, PERRL, EOMI	  Lymphatic: No lymphadenopathy  Cardiovascular: Normal S1 S2, No JVD, + murmurs, No edema  Respiratory: rhonchi  Psychiatry: mild dementia  Gastrointestinal:  Soft, Non-tender, + BS	  Skin: No rashes, No ecchymoses, No cyanosis	  Neurologic: Non-focal  Extremities: Normal range of motion, No clubbing, cyanosis or edema  Vascular: Peripheral pulses palpable 2+ bilaterally    MEDICATIONS  (STANDING):  apixaban 2.5 milliGRAM(s) Oral every 12 hours  cyanocobalamin 1000 MICROGram(s) Oral daily  furosemide   Injectable 20 milliGRAM(s) IV Push daily  metoprolol succinate ER 25 milliGRAM(s) Oral daily  multivitamin 1 Tablet(s) Oral daily      TELEMETRY: 	    ECG:  	  RADIOLOGY:  OTHER: 	  	  LABS:	 	    CARDIAC MARKERS:                                11.5   6.56  )-----------( 245      ( 30 Jan 2023 12:16 )             36.0     01-30    145  |  107  |  22  ----------------------------<  93  3.3<L>   |  26  |  0.52    Ca    9.3      30 Jan 2023 12:16    TPro  6.6  /  Alb  3.7  /  TBili  0.9  /  DBili  x   /  AST  19  /  ALT  15  /  AlkPhos  77  01-30    proBNP: Serum Pro-Brain Natriuretic Peptide: 2860 pg/mL (01-30 @ 12:16)    Lipid Profile:   HgA1c:   TSH:     < from: Transthoracic Echocardiogram (01.31.23 @ 11:41) >  1. Mitral annular calcification.  Mitral valve prolapse  involving the posterior mitral leaflet. Mild mitral  regurgitation.  2. Severely dilated left atrium.  LA volume index = 90  cc/m2.  3. Mild global left ventricular systolic dysfunction.  4. The right ventricle is not well visualized; grossly  reduced  right ventricular systolic function.  5. Estimated right ventricular systolic pressure equals 30  mm Hg, assuming right atrial pressure equals 8 mm Hg,  consistent with normal pulmonary pressures.        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 Daughter at bedside to help with history. Patient had 3+ pitting edema in BLE last week, has since resolved. Also has been less mobile recently due to worsening DENSON. No hx COPD or other pulmonary condition, no hx CHF. Denies fevers, urinary symptoms, abdominal pain, nvd.  pt with hx of dementia, htn, a,fib on AC with c/o chest pain and sob  CTA of chest noted with no PE with small r sided effusion  ?chf / hfpef  continue all cardiac meds  trop  are all negative  ? also sob can be from hx of asthma, start on inhalers  continue iv Lasix with gentle diuresis  fu lytes  echo noted with MVP and mild global lv dysfunction medical therapy  add losartan 25 mg daily, continue Lasix and beta blocker  a.fib hr controlled continue AC

## 2023-02-01 NOTE — PROGRESS NOTE ADULT - SUBJECTIVE AND OBJECTIVE BOX
DATE OF SERVICE: 23 @ 08:45    HPI:  Hx HN, MVR, AF, [facility], until today co sob and chest pain sent to ed, trop ok, see cxr CTA neg, See Cardio, disc c Dr Beckford  pt is demented c no ST Mem cannot statre ab=ny details related to admission and feels fine now after dose lasix asking for  to be called(ret MD who is )  no cp no not sob (2023 18:44)      Interval Events  see echo see [id number], Dil LA, MR, LV dysfxn - called dtr disc plan  needs PT eval     MEDICATIONS  (STANDING):  apixaban 2.5 milliGRAM(s) Oral every 12 hours  cyanocobalamin 1000 MICROGram(s) Oral daily  furosemide   Injectable 20 milliGRAM(s) IV Push daily  losartan 25 milliGRAM(s) Oral daily  metoprolol succinate ER 25 milliGRAM(s) Oral daily  multivitamin 1 Tablet(s) Oral daily    MEDICATIONS  (PRN):  acetaminophen     Tablet .. 650 milliGRAM(s) Oral every 6 hours PRN Temp greater or equal to 38C (100.4F), Mild Pain (1 - 3)      Patient is a 87y old  Female who presents with a chief complaint of chest pain sob (2023 06:45)      REVIEW OF SYSTEMS    General:nad no co  Skin/Breast:  Ophthalmologic:no co no ch  ENMT:	no co no ch  Respiratory and Thorax:no cough no sp no sob  Cardiovascular:no cp no palp  Gastrointestinal:no nvcd  Genitourinary:no fdi  Musculoskeletal:	mild pain r back - wants tylenol  Neurological:	mem poor confabulkates  Psychiatric:	calm usus coop  Hematology/Lymphatics:	  Endocrine:	no polyudd  Allergic/Immunologic:  AOSN	y      Vital Signs Last 24 Hrs  T(C): 36.8 (2023 21:03), Max: 36.8 (2023 21:03)  T(F): 98.3 (2023 21:03), Max: 98.3 (2023 21:03)  HR: 74 (2023 21:03) (74 - 102)  BP: 143/86 (2023 21:03) (102/70 - 143/86)  BP(mean): --  RR: 18 (2023 21:03) (17 - 18)  SpO2: 94% (2023 21:03) (94% - 95%)    Parameters below as of 2023 21:03  Patient On (Oxygen Delivery Method): room air        PHYSICAL EXAM:    Constitutional:vss nad  H+N ncat  Eyes:natalie cwnl  ENMT:hears speaks swallows ok  Neck:no cb no tm  Breasts:  Back:  Respiratory:ctab no rrw better  Cardiovascular:irreg irreg m  Gastrointestinal: BS+ soft nt mild tymp  Genitourinary:no ritchie  Rectal:  Extremities:no cce  Vascular:hm- vv=-  Neurological:nfatmae in bed, confabulates  Skin:cdi  Lymph Nodes:  Musculoskeletal:  Psychiatric:calm reorients, coop even when she says she wont    LABS  CBC Full  -  ( 2023 12:16 )  WBC Count : 6.56 K/uL  RBC Count : 3.72 M/uL  Hemoglobin : 11.5 g/dL  Hematocrit : 36.0 %  Platelet Count - Automated : 245 K/uL  Mean Cell Volume : 96.8 fl  Mean Cell Hemoglobin : 30.9 pg  Mean Cell Hemoglobin Concentration : 31.9 gm/dL  Auto Neutrophil # : 4.70 K/uL  Auto Lymphocyte # : 0.99 K/uL  Auto Monocyte # : 0.75 K/uL  Auto Eosinophil # : 0.05 K/uL  Auto Basophil # : 0.03 K/uL  Auto Neutrophil % : 71.6 %  Auto Lymphocyte % : 15.1 %  Auto Monocyte % : 11.4 %  Auto Eosinophil % : 0.8 %  Auto Basophil % : 0.5 %          141  |  105  |  24<H>  ----------------------------<  96  3.8   |  25  |  0.47<L>    Ca    9.1      2023 06:47    TPro  6.6  /  Alb  3.7  /  TBili  0.9  /  DBili  x   /  AST  19  /  ALT  15  /  AlkPhos  77            Imaging:    Xray:    Echo:    CT:    MRI:    Tele:    Orders:    BRUNO Dewitt [phone]

## 2023-02-01 NOTE — PROGRESS NOTE ADULT - ASSESSMENT
labs ok, disvc c Dr GARCIA cardio, added Losartan, poss dc tomorrow needs PT dsisc c Dtr Silvia, sandra planning c PT, can be oob  chest pain res  Dyspnea res  CHF, added laossartan and lasix to bblkr  mvr , mr  AF- noac  CM  hypo k res  sm r pl eff   osteoporosis  chr lo back pain - can get tylenol

## 2023-02-02 LAB
ANION GAP SERPL CALC-SCNC: 7 MMOL/L — SIGNIFICANT CHANGE UP (ref 5–17)
BUN SERPL-MCNC: 26 MG/DL — HIGH (ref 7–23)
CALCIUM SERPL-MCNC: 8.9 MG/DL — SIGNIFICANT CHANGE UP (ref 8.4–10.5)
CHLORIDE SERPL-SCNC: 103 MMOL/L — SIGNIFICANT CHANGE UP (ref 96–108)
CO2 SERPL-SCNC: 29 MMOL/L — SIGNIFICANT CHANGE UP (ref 22–31)
CREAT SERPL-MCNC: 0.57 MG/DL — SIGNIFICANT CHANGE UP (ref 0.5–1.3)
EGFR: 88 ML/MIN/1.73M2 — SIGNIFICANT CHANGE UP
GLUCOSE SERPL-MCNC: 101 MG/DL — HIGH (ref 70–99)
POTASSIUM SERPL-MCNC: 3.4 MMOL/L — LOW (ref 3.5–5.3)
POTASSIUM SERPL-SCNC: 3.4 MMOL/L — LOW (ref 3.5–5.3)
SODIUM SERPL-SCNC: 139 MMOL/L — SIGNIFICANT CHANGE UP (ref 135–145)

## 2023-02-02 RX ORDER — LISINOPRIL 2.5 MG/1
2.5 TABLET ORAL DAILY
Refills: 0 | Status: DISCONTINUED | OUTPATIENT
Start: 2023-02-02 | End: 2023-02-04

## 2023-02-02 RX ORDER — TRAMADOL HYDROCHLORIDE 50 MG/1
25 TABLET ORAL THREE TIMES A DAY
Refills: 0 | Status: DISCONTINUED | OUTPATIENT
Start: 2023-02-02 | End: 2023-02-04

## 2023-02-02 RX ORDER — FUROSEMIDE 40 MG
20 TABLET ORAL DAILY
Refills: 0 | Status: DISCONTINUED | OUTPATIENT
Start: 2023-02-02 | End: 2023-02-04

## 2023-02-02 RX ORDER — POTASSIUM CHLORIDE 20 MEQ
10 PACKET (EA) ORAL DAILY
Refills: 0 | Status: DISCONTINUED | OUTPATIENT
Start: 2023-02-02 | End: 2023-02-02

## 2023-02-02 RX ORDER — ACETAMINOPHEN 500 MG
650 TABLET ORAL EVERY 8 HOURS
Refills: 0 | Status: DISCONTINUED | OUTPATIENT
Start: 2023-02-02 | End: 2023-02-04

## 2023-02-02 RX ADMIN — LOSARTAN POTASSIUM 25 MILLIGRAM(S): 100 TABLET, FILM COATED ORAL at 05:46

## 2023-02-02 RX ADMIN — Medication 650 MILLIGRAM(S): at 12:47

## 2023-02-02 RX ADMIN — Medication 650 MILLIGRAM(S): at 15:30

## 2023-02-02 RX ADMIN — Medication 20 MILLIGRAM(S): at 05:46

## 2023-02-02 RX ADMIN — TRAMADOL HYDROCHLORIDE 25 MILLIGRAM(S): 50 TABLET ORAL at 14:08

## 2023-02-02 RX ADMIN — PREGABALIN 1000 MICROGRAM(S): 225 CAPSULE ORAL at 12:18

## 2023-02-02 RX ADMIN — Medication 650 MILLIGRAM(S): at 14:57

## 2023-02-02 RX ADMIN — APIXABAN 2.5 MILLIGRAM(S): 2.5 TABLET, FILM COATED ORAL at 17:57

## 2023-02-02 RX ADMIN — Medication 1 TABLET(S): at 12:18

## 2023-02-02 RX ADMIN — Medication 25 MILLIGRAM(S): at 05:46

## 2023-02-02 RX ADMIN — APIXABAN 2.5 MILLIGRAM(S): 2.5 TABLET, FILM COATED ORAL at 05:46

## 2023-02-02 RX ADMIN — Medication 650 MILLIGRAM(S): at 12:17

## 2023-02-02 RX ADMIN — TRAMADOL HYDROCHLORIDE 25 MILLIGRAM(S): 50 TABLET ORAL at 14:30

## 2023-02-02 NOTE — PROGRESS NOTE ADULT - SUBJECTIVE AND OBJECTIVE BOX
Patient evaluated measured fitted and delivered TLSO  spinal orthosis to aid in treatment of T12 compression fracture  ordered by Medicine for OBB use to control thoracic spine in all planes  to aid in healing and pain control delivered by [facility] [phone]

## 2023-02-02 NOTE — PROGRESS NOTE ADULT - SUBJECTIVE AND OBJECTIVE BOX
CARDIOLOGY     PROGRESS  NOTE   ________________________________________________    CHIEF COMPLAINT:Patient is a 87y old  Female who presents with a chief complaint of chest pain sob (01 Feb 2023 08:44)  doing well, agitated yesterday  	  REVIEW OF SYSTEMS:  CONSTITUTIONAL: No fever, weight loss, or fatigue  EYES: No eye pain, visual disturbances, or discharge  ENT:  No difficulty hearing, tinnitus, vertigo; No sinus or throat pain  NECK: No pain or stiffness  RESPIRATORY: No cough, wheezing, chills or hemoptysis; No Shortness of Breath  CARDIOVASCULAR: No chest pain, palpitations, passing out, dizziness, or leg swelling  GASTROINTESTINAL: No abdominal or epigastric pain. No nausea, vomiting, or hematemesis; No diarrhea or constipation. No melena or hematochezia.  GENITOURINARY: No dysuria, frequency, hematuria, or incontinence  NEUROLOGICAL: No headaches, memory loss, loss of strength, numbness, or tremors  SKIN: No itching, burning, rashes, or lesions   LYMPH Nodes: No enlarged glands  ENDOCRINE: No heat or cold intolerance; No hair loss  MUSCULOSKELETAL: No joint pain or swelling; No muscle, back, or extremity pain  PSYCHIATRIC: No depression, anxiety, mood swings, or difficulty sleeping  HEME/LYMPH: No easy bruising, or bleeding gums  ALLERGY AND IMMUNOLOGIC: No hives or eczema	    [x ] All others negative	  [ ] Unable to obtain    PHYSICAL EXAM:  T(C): 36.2 (02-02-23 @ 05:15), Max: 36.7 (02-01-23 @ 13:12)  HR: 82 (02-02-23 @ 05:15) (68 - 100)  BP: 109/67 (02-02-23 @ 05:15) (103/64 - 144/92)  RR: 18 (02-02-23 @ 05:15) (18 - 18)  SpO2: 94% (02-02-23 @ 05:15) (92% - 98%)  Wt(kg): --  I&O's Summary    01 Feb 2023 07:01  -  02 Feb 2023 07:00  --------------------------------------------------------  IN: 720 mL / OUT: 650 mL / NET: 70 mL        Appearance: Normal	  HEENT:   Normal oral mucosa, PERRL, EOMI	  Lymphatic: No lymphadenopathy  Cardiovascular: Normal S1 S2, No JVD, + murmurs, No edema  Respiratory: rhonchi  Psychiatry: A & O x 3, Mood & affect appropriate  Gastrointestinal:  Soft, Non-tender, + BS	  Skin: No rashes, No ecchymoses, No cyanosis	  Neurologic: Non-focal  Extremities: Normal range of motion, No clubbing, cyanosis or edema  Vascular: Peripheral pulses palpable 2+ bilaterally    MEDICATIONS  (STANDING):  apixaban 2.5 milliGRAM(s) Oral every 12 hours  cyanocobalamin 1000 MICROGram(s) Oral daily  furosemide   Injectable 20 milliGRAM(s) IV Push daily  losartan 25 milliGRAM(s) Oral daily  metoprolol succinate ER 25 milliGRAM(s) Oral daily  multivitamin 1 Tablet(s) Oral daily      TELEMETRY: 	    ECG:  	  RADIOLOGY:  OTHER: 	  	  LABS:	 	    CARDIAC MARKERS:            02-02    139  |  103  |  26<H>  ----------------------------<  101<H>  3.4<L>   |  29  |  0.57    Ca    8.9      02 Feb 2023 06:35      proBNP: Serum Pro-Brain Natriuretic Peptide: 1368 pg/mL (02-01 @ 06:47)  Serum Pro-Brain Natriuretic Peptide: 2860 pg/mL (01-30 @ 12:16)    Lipid Profile:   HgA1c:   TSH: Thyroid Stimulating Hormone, Serum: 1.02 uIU/mL (02-01 @ 06:54)      < from: Transthoracic Echocardiogram (01.31.23 @ 11:41) >  1. Mitral annular calcification.  Mitral valve prolapse  involving the posterior mitral leaflet. Mild mitral  regurgitation.  2. Severely dilated left atrium.  LA volume index = 90  cc/m2.  3. Mild global left ventricular systolic dysfunction.  4. The right ventricle is not well visualized; grossly  reduced  right ventricular systolic function.  5. Estimated right ventricular systolic pressure equals 30  mm Hg, assuming right atrial pressure equals 8 mm Hg,  consistent with normal pulmonary pressures.        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 Daughter at bedside to help with history. Patient had 3+ pitting edema in BLE last week, has since resolved. Also has been less mobile recently due to worsening DENSON. No hx COPD or other pulmonary condition, no hx CHF. Denies fevers, urinary symptoms, abdominal pain, nvd.  pt with hx of dementia, htn, a,fib on AC with c/o chest pain and sob  CTA of chest noted with no PE with small r sided effusion  ?chf / hfpef  continue all cardiac meds  trop  are all negative  ? also sob can be from hx of asthma, start on inhalers  continue iv Lasix with gentle diuresis  fu lytes  echo noted with MVP and mild global lv dysfunction medical therapy  add losartan 25 mg daily, continue Lasix and beta blocker  a.fib hr controlled continue AC  cahnge lasix to po, add kcl 10 meq daily  dc toady  will change losartan to lisinopril 2.5 mg daily    	           History     Chief Complaint   Patient presents with     Abdominal Pain     Nausea     HPI  Ana Leblanc is a 36 year old female with a history of colon cancer s/p colectomy (2010) who presents for evaluation of abdominal pain.  Patient has regular follow-up with her GI and occult GI hematologic physician.  Patient complains of intermittent abdominal pain, primarily localized to the epigastrium, that acutely worsened this morning. Her pain radiates through to her back. She has not noticed an increase of pain after eating. She has had associated nausea and decreased PO/fluid intake over the past three days, but denies vomiting. Her last PO intake was a piece of cheese last night. She denies fevers, chills, or diarrhea. No dysuria, vaginal bleeding, or vaginal discharge. She takes daily Tylenol, but has no other prescribed medications. She does not drink alcohol.     I have reviewed the Medications, Allergies, Past Medical and Surgical History, and Social History in the [facility] system.  Past Medical History:   Diagnosis Date     Malignant neoplasm (H)     colon       Past Surgical History:   Procedure Laterality Date     ABDOMEN SURGERY      colon removed 7/10       No family history on file.    Social History   Substance Use Topics     Smoking status: Never Smoker     Smokeless tobacco: Never Used     Alcohol use No       Current Facility-Administered Medications   Medication     lidocaine (viscous) (XYLOCAINE) 2 % 15 mL, alum & mag hydroxide-simethicone (MYLANTA ES/MAALOX  ES) 15 mL GI Cocktail     Current Outpatient Prescriptions   Medication     Acetaminophen-Codeine (TYLENOL/CODEINE #3) 300-30 MG per tablet     fexofenadine (ALLEGRA) 180 MG tablet     ibuprofen (ADVIL,MOTRIN) 200 MG tablet        Allergies   Allergen Reactions     Chicken Allergy Hives       Review of Systems  ROS: 14 point ROS neg other than the symptoms noted above in the HPI.    Physical Exam   BP: 140/86  Pulse: 74  Temp: 97.5  F (36.4  " C)  Resp: 18  Height: 170.2 cm (5' 7\")  Weight: 93.4 kg (205 lb 14.4 oz)  SpO2: 100 %      Physical Exam  GEN: Well appearing, non toxic, cooperative.   HEENT: The head is normocephalic and atraumatic. Pupils are equal round and reactive to light. Extraocular motions are intact. There is no facial swelling. The neck is nontender and supple.   CV: Regular rate and rhythm without murmurs rubs or gallops. 2+ radial pulses bilaterally.  PULM: Clear to auscultation bilaterally.  ABD: Soft, epigastric tenderness, nondistended.   EXT: Full range of motion.  No edema.  NEURO: Cranial nerves II through XII are intact and symmetric. Bilateral upper and lower extremities grossly show full range of motion without any focal deficits.   SKIN: No rashes, ecchymosis, or lacerations  PSYCH: Calm and cooperative, interactive.     ED Course     ED Course     Procedures       1:20 PM  The patient was seen and examined by Dr. Moreno in Room 19.          Critical Care time:  none     Results for orders placed or performed during the hospital encounter of 11/16/18   Abdomen US, limited (RUQ only)   Result Value Ref Range    Radiologist flags Concern for acute cholecystitis in the appropriate (Urgent)     Narrative    EXAMINATION: Limited Abdominal Ultrasound, 11/16/2018 3:56 PM     COMPARISON: None.    HISTORY: Right upper quadrant and epigastric pain    FINDINGS:   Fluid: No evidence of ascites or pleural effusions.    Liver: The liver demonstrates mild diffuse increased echogenicity,  measuring 18.2 cm in craniocaudal dimension. There is no focal mass.    Gallbladder: The gallbladder wall is borderline thickened measuring 3  mm. There is minimal pericholecystic fluid. There are numerous  shadowing, mobile stones within the gallbladder lumen. Sonographic  Montiel sign was not elicited during the exam. Mural hyperemia is not  present.    Bile Ducts: Both the intra- and extrahepatic biliary system are of  normal caliber.  The common bile " duct measures 5.4 mm in diameter.    Pancreas: Visualized portions of the head and body of the pancreas are  unremarkable.     Kidney: The right kidney measures 10.9 cm long. There is no  hydronephrosis or hydroureter, no shadowing renal calculi, cystic  lesion or mass.       Impression    IMPRESSION:   1.  Cholelithiasis with borderline thickened gallbladder wall an  minimal pericholecystic fluid. Cannot exclude cholecystitis in the  appropriate clinical setting. If clinical concern persists, consider  further evaluation with HIDA scan Nuclear medicine study.  2.  Hepatomegaly. Mild diffuse increased echogenicity throughout the  liver parenchyma which could be seen in hepatic intrinsic disease such  as hepatic steatosis.      [Urgent Result: Concern for acute cholecystitis in the appropriate  clinical setting.]    Finding was identified on 11/16/2018 3:56 PM.     Dr. Moreno was contacted by Dr. Xie at 11/16/2018 4:13 PM and  verbalized understanding of the urgent finding.     I have personally reviewed the examination and initial interpretation  and I agree with the findings.    CHRIS MELGAR MD   HCG qualitative urine   Result Value Ref Range    HCG Qual Urine Negative NEG^Negative   UA with Microscopic   Result Value Ref Range    Color Urine Light Yellow     Appearance Urine Clear     Glucose Urine Negative NEG^Negative mg/dL    Bilirubin Urine Negative NEG^Negative    Ketones Urine 10 (A) NEG^Negative mg/dL    Specific Gravity Urine 1.008 1.003 - 1.035    Blood Urine Negative NEG^Negative    pH Urine 5.0 5.0 - 7.0 pH    Protein Albumin Urine Negative NEG^Negative mg/dL    Urobilinogen mg/dL Normal 0.0 - 2.0 mg/dL    Nitrite Urine Negative NEG^Negative    Leukocyte Esterase Urine Negative NEG^Negative    Source Clean catch urine     WBC Urine 1 0 - 5 /HPF    RBC Urine 68 (H) 0 - 2 /HPF    Squamous Epithelial /HPF Urine <1 0 - 1 /HPF    Mucous Urine Present (A) NEG^Negative /LPF   CBC with platelets  differential   Result Value Ref Range    WBC 6.2 4.0 - 11.0 10e9/L    RBC Count 4.35 3.8 - 5.2 10e12/L    Hemoglobin 13.9 11.7 - 15.7 g/dL    Hematocrit 42.5 35.0 - 47.0 %    MCV 98 78 - 100 fl    MCH 32.0 26.5 - 33.0 pg    MCHC 32.7 31.5 - 36.5 g/dL    RDW 13.3 10.0 - 15.0 %    Platelet Count 258 150 - 450 10e9/L    Diff Method Automated Method     % Neutrophils 54.3 %    % Lymphocytes 35.6 %    % Monocytes 7.3 %    % Eosinophils 2.4 %    % Basophils 0.2 %    % Immature Granulocytes 0.2 %    Nucleated RBCs 0 0 /100    Absolute Neutrophil 3.4 1.6 - 8.3 10e9/L    Absolute Lymphocytes 2.2 0.8 - 5.3 10e9/L    Absolute Monocytes 0.5 0.0 - 1.3 10e9/L    Absolute Eosinophils 0.2 0.0 - 0.7 10e9/L    Absolute Basophils 0.0 0.0 - 0.2 10e9/L    Abs Immature Granulocytes 0.0 0 - 0.4 10e9/L    Absolute Nucleated RBC 0.0    Comprehensive metabolic panel   Result Value Ref Range    Sodium 136 133 - 144 mmol/L    Potassium 4.4 3.4 - 5.3 mmol/L    Chloride 105 94 - 109 mmol/L    Carbon Dioxide 23 20 - 32 mmol/L    Anion Gap 9 3 - 14 mmol/L    Glucose 72 70 - 99 mg/dL    Urea Nitrogen 5 (L) 7 - 30 mg/dL    Creatinine 0.51 (L) 0.52 - 1.04 mg/dL    GFR Estimate >90 >60 mL/min/1.7m2    GFR Estimate If Black >90 >60 mL/min/1.7m2    Calcium 9.0 8.5 - 10.1 mg/dL    Bilirubin Total 0.5 0.2 - 1.3 mg/dL    Albumin 3.7 3.4 - 5.0 g/dL    Protein Total 8.9 (H) 6.8 - 8.8 g/dL    Alkaline Phosphatase 29 (L) 40 - 150 U/L    ALT 27 0 - 50 U/L    AST 36 0 - 45 U/L   Lipase   Result Value Ref Range    Lipase 74 73 - 393 U/L   Lactic acid whole blood   Result Value Ref Range    Lactic Acid 1.4 0.7 - 2.0 mmol/L               Labs Ordered and Resulted from Time of ED Arrival Up to the Time of Departure from the ED   ROUTINE UA WITH MICROSCOPIC - Abnormal; Notable for the following:        Result Value    Ketones Urine 10 (*)     RBC Urine 68 (*)     Mucous Urine Present (*)     All other components within normal limits   COMPREHENSIVE METABOLIC PANEL -  Abnormal; Notable for the following:     Urea Nitrogen 5 (*)     Creatinine 0.51 (*)     Protein Total 8.9 (*)     Alkaline Phosphatase 29 (*)     All other components within normal limits   HCG QUALITATIVE URINE   CBC WITH PLATELETS DIFFERENTIAL   LIPASE   LACTIC ACID WHOLE BLOOD            Assessments & Plan (with Medical Decision Making)   Patient is a 36-year-old female with a history of colectomy due to colon cancer who presents with 3 or 4 days of epigastric and right upper quadrant tenderness that progressively worsened today.  Initial vitals were within normal limits.  Exam as above, most notable for right upper quadrant and epigastric tenderness.  Labs were obtained and for the most part are within normal limits.  Given her exam, I was concerned for biliary pathology prompting ultrasound of the right upper quadrant.  This ultrasound showed gallstones, thickened gallbladder wall with mild pericholecystic fluid.  I consulted general surgery who evaluated the patient in the emergency department and plan to admit to observation for cholecystectomy.  Patient was given IV Dilaudid for ongoing pain.  She remained stable during her stay in the emergency department was admitted to the surgery service.    I have reviewed the nursing notes.        New Prescriptions    No medications on file       Final diagnoses:   Acute cholecystitis   Domenica HALL, am serving as a trained medical scribe to document services personally performed by Matthew Moreno MD, based on the provider's statements to me.   Matthew HALL MD, was physically present and have reviewed and verified the accuracy of this note documented by Domenica Alvarenga.    11/16/2018   [facility], EMERGENCY DEPARTMENT     Matthew Moreno MD  11/16/18 [id number]

## 2023-02-02 NOTE — PROGRESS NOTE ADULT - SUBJECTIVE AND OBJECTIVE BOX
DATE OF SERVICE: 23 @ 08:44    HPI:  Hx HN, MVR, AF, CHF has been stable at [facility], until today co sob and chest pain sent to ed, trop ok, see cxr CTA neg, See Cardio, disc c Dr Beckford  pt is demented c no ST Mem cannot statre ab=ny details related to admission and feels fine now after dose lasix asking for  to be called(ret MD who is )  no cp no not sob (2023 18:44)      Interval Events  dtr called about comp fx, "age indetrerminate" not cand for intervention, ortho rec brace  zpoke c dtr again considering alternate, needs pain meds regularly, cant ask  asee notes, waiting for brace for back and PT    MEDICATIONS  (STANDING):  apixaban 2.5 milliGRAM(s) Oral every 12 hours  cyanocobalamin 1000 MICROGram(s) Oral daily  furosemide    Tablet 20 milliGRAM(s) Oral daily  lisinopril 2.5 milliGRAM(s) Oral daily  metoprolol succinate ER 25 milliGRAM(s) Oral daily  multivitamin 1 Tablet(s) Oral daily  potassium chloride    Tablet ER 10 milliEquivalent(s) Oral daily    MEDICATIONS  (PRN):  acetaminophen     Tablet .. 650 milliGRAM(s) Oral every 6 hours PRN Temp greater or equal to 38C (100.4F), Mild Pain (1 - 3)  traMADol 25 milliGRAM(s) Oral every 6 hours PRN Moderate Pain (4 - 6)      Patient is a 87y old  Female who presents with a chief complaint of chest pain sob (2023 07:30)      REVIEW OF SYSTEMS    General:no co denies pain in bed now  Skin/Breast:  Ophthalmologic:no co no ch  ENMT:	no co no ch  Respiratory and Thorax: no cough no sp no sob  Cardiovascular:no cp no palp  Gastrointestinal:no nvcd  Genitourinary:no fd  Musculoskeletal:	no pain now  Neurological:	memquite poor akde for her  then said "is he dead?"  Psychiatric:	occ agitated but reorients and moxstly calm, coop  Hematology/Lymphatics:	  Endocrine:	no polyudd  Allergic/Immunologic:  AOSN	y      Vital Signs Last 24 Hrs  T(C): 36.2 (2023 05:15), Max: 36.7 (2023 13:12)  T(F): 97.1 (2023 05:15), Max: 98.1 (2023 13:12)  HR: 82 (2023 05:15) (68 - 100)  BP: 109/67 (2023 05:15) (103/64 - 144/92)  BP(mean): --  RR: 18 (2023 05:15) (18 - 18)  SpO2: 94% (2023 05:15) (92% - 98%)    Parameters below as of 2023 05:15  Patient On (Oxygen Delivery Method): room air        PHYSICAL EXAM:    Constitutional:nad no pain  H+Nncat  Eyes:natalie cwnl  ENMT:hears speaks eats ok  Neck:no cb no tm  Breasts:  Back:  Respiratory:ctab no rrw  Cardiovascular:rrr now  Gastrointestinal:sooft nt  Genitourinary:  Rectal:  Extremities:no cce  Vascular:hm- vv-  Neurological:nfatmae in bed, mem poor  Skin:cdi  Lymph Nodes:  Musculoskeletal:  Psychiatric:calm coop    LABS          139  |  103  |  26<H>  ----------------------------<  101<H>  3.4<L>   |  29  |  0.57    Ca    8.9      2023 06:35            Imaging:    Xray:    Echo:    CT:    MRI:    Tele:    Orders:    BRUNO Dewitt [phone] DATE OF SERVICE: 23 @ 08:44    HPI:  Hx HN, MVR, AF, CHF has been stable at [facility], until today co sob and chest pain sent to ed, trop ok, see cxr CTA neg, See Cardio, disc c Dr Beckford  pt is demented c no ST Mem cannot statre ab=ny details related to admission and feels fine now after dose lasix asking for  to be called(ret MD who is )  no cp no not sob (2023 18:44)      Interval Events  dtr called about comp fx, "age indetrerminate" not cand for intervention, ortho rec brace  zpoke c dtr again considering alternate, needs pain meds regularly, cant ask  asee notes, waiting for brace for back and PT  disc c NP re ortho, brace , dc    MEDICATIONS  (STANDING):  apixaban 2.5 milliGRAM(s) Oral every 12 hours  cyanocobalamin 1000 MICROGram(s) Oral daily  furosemide    Tablet 20 milliGRAM(s) Oral daily  lisinopril 2.5 milliGRAM(s) Oral daily  metoprolol succinate ER 25 milliGRAM(s) Oral daily  multivitamin 1 Tablet(s) Oral daily  potassium chloride    Tablet ER 10 milliEquivalent(s) Oral daily    MEDICATIONS  (PRN):  acetaminophen     Tablet .. 650 milliGRAM(s) Oral every 6 hours PRN Temp greater or equal to 38C (100.4F), Mild Pain (1 - 3)  traMADol 25 milliGRAM(s) Oral every 6 hours PRN Moderate Pain (4 - 6)      Patient is a 87y old  Female who presents with a chief complaint of chest pain sob (2023 07:30)      REVIEW OF SYSTEMS    General:no co denies pain in bed now  Skin/Breast:  Ophthalmologic:no co no ch  ENMT:	no co no ch  Respiratory and Thorax: no cough no sp no sob  Cardiovascular:no cp no palp  Gastrointestinal:no nvcd  Genitourinary:no fd  Musculoskeletal:	no pain now  Neurological:	memquite poor akde for her  then said "is he dead?"  Psychiatric:	occ agitated but reorients and moxstly calm, coop  Hematology/Lymphatics:	  Endocrine:	no polyudd  Allergic/Immunologic:  AOSN	y      Vital Signs Last 24 Hrs  T(C): 36.2 (2023 05:15), Max: 36.7 (2023 13:12)  T(F): 97.1 (2023 05:15), Max: 98.1 (2023 13:12)  HR: 82 (2023 05:15) (68 - 100)  BP: 109/67 (2023 05:15) (103/64 - 144/92)  BP(mean): --  RR: 18 (2023 05:15) (18 - 18)  SpO2: 94% (2023 05:15) (92% - 98%)    Parameters below as of 2023 05:15  Patient On (Oxygen Delivery Method): room air        PHYSICAL EXAM:    Constitutional:nad no pain  H+Nncat  Eyes:natalie cwnl  ENMT:hears speaks eats ok  Neck:no cb no tm  Breasts:  Back:  Respiratory:ctab no rrw  Cardiovascular:rrr now  Gastrointestinal:sooft nt  Genitourinary:  Rectal:  Extremities:no cce  Vascular:hm- vv-  Neurological:nfatmae in bed, mem poor  Skin:cdi  Lymph Nodes:  Musculoskeletal:  Psychiatric:calm coop    LABS          139  |  103  |  26<H>  ----------------------------<  101<H>  3.4<L>   |  29  |  0.57    Ca    8.9      2023 06:35            Imaging:    Xray:    Echo:    CT:    MRI:    Tele:    Frank:    BRUNO Dewitt [phone]

## 2023-02-02 NOTE — PROGRESS NOTE ADULT - ASSESSMENT
K 3.4, lo but ok  chjaet pain res  Dyspnea res  AF - doac  SDAT  calm coop st memv poor  MVR  MR  CCHF  CM  Sm pleural eff  OA DJD kneew  Osteoporosis\Comp FX T spine - likelly old, getting back brace, needs PT - vcan get standing pain meds  Gait instabiliyty -   dc planninmg re care home - dtr wants her to move to another facility - will speak c CC and Btristal

## 2023-02-03 LAB
ANION GAP SERPL CALC-SCNC: 12 MMOL/L — SIGNIFICANT CHANGE UP (ref 5–17)
BUN SERPL-MCNC: 28 MG/DL — HIGH (ref 7–23)
CALCIUM SERPL-MCNC: 9.9 MG/DL — SIGNIFICANT CHANGE UP (ref 8.4–10.5)
CHLORIDE SERPL-SCNC: 99 MMOL/L — SIGNIFICANT CHANGE UP (ref 96–108)
CO2 SERPL-SCNC: 28 MMOL/L — SIGNIFICANT CHANGE UP (ref 22–31)
CREAT SERPL-MCNC: 0.62 MG/DL — SIGNIFICANT CHANGE UP (ref 0.5–1.3)
EGFR: 86 ML/MIN/1.73M2 — SIGNIFICANT CHANGE UP
GLUCOSE SERPL-MCNC: 113 MG/DL — HIGH (ref 70–99)
POTASSIUM SERPL-MCNC: 3.6 MMOL/L — SIGNIFICANT CHANGE UP (ref 3.5–5.3)
POTASSIUM SERPL-SCNC: 3.6 MMOL/L — SIGNIFICANT CHANGE UP (ref 3.5–5.3)
SARS-COV-2 RNA SPEC QL NAA+PROBE: SIGNIFICANT CHANGE UP
SODIUM SERPL-SCNC: 139 MMOL/L — SIGNIFICANT CHANGE UP (ref 135–145)

## 2023-02-03 PROCEDURE — 71045 X-RAY EXAM CHEST 1 VIEW: CPT | Mod: 26

## 2023-02-03 RX ORDER — POTASSIUM CHLORIDE 20 MEQ
40 PACKET (EA) ORAL ONCE
Refills: 0 | Status: COMPLETED | OUTPATIENT
Start: 2023-02-03 | End: 2023-02-03

## 2023-02-03 RX ORDER — POTASSIUM CHLORIDE 20 MEQ
40 PACKET (EA) ORAL ONCE
Refills: 0 | Status: DISCONTINUED | OUTPATIENT
Start: 2023-02-03 | End: 2023-02-03

## 2023-02-03 RX ADMIN — TRAMADOL HYDROCHLORIDE 25 MILLIGRAM(S): 50 TABLET ORAL at 13:44

## 2023-02-03 RX ADMIN — APIXABAN 2.5 MILLIGRAM(S): 2.5 TABLET, FILM COATED ORAL at 17:26

## 2023-02-03 RX ADMIN — TRAMADOL HYDROCHLORIDE 25 MILLIGRAM(S): 50 TABLET ORAL at 14:35

## 2023-02-03 RX ADMIN — Medication 650 MILLIGRAM(S): at 06:13

## 2023-02-03 RX ADMIN — PREGABALIN 1000 MICROGRAM(S): 225 CAPSULE ORAL at 13:44

## 2023-02-03 RX ADMIN — APIXABAN 2.5 MILLIGRAM(S): 2.5 TABLET, FILM COATED ORAL at 05:20

## 2023-02-03 RX ADMIN — TRAMADOL HYDROCHLORIDE 25 MILLIGRAM(S): 50 TABLET ORAL at 06:13

## 2023-02-03 RX ADMIN — Medication 650 MILLIGRAM(S): at 05:19

## 2023-02-03 RX ADMIN — LISINOPRIL 2.5 MILLIGRAM(S): 2.5 TABLET ORAL at 05:20

## 2023-02-03 RX ADMIN — Medication 650 MILLIGRAM(S): at 21:25

## 2023-02-03 RX ADMIN — Medication 25 MILLIGRAM(S): at 05:20

## 2023-02-03 RX ADMIN — TRAMADOL HYDROCHLORIDE 25 MILLIGRAM(S): 50 TABLET ORAL at 05:19

## 2023-02-03 RX ADMIN — Medication 40 MILLIEQUIVALENT(S): at 13:44

## 2023-02-03 RX ADMIN — TRAMADOL HYDROCHLORIDE 25 MILLIGRAM(S): 50 TABLET ORAL at 21:27

## 2023-02-03 RX ADMIN — Medication 1 TABLET(S): at 13:44

## 2023-02-03 RX ADMIN — Medication 20 MILLIGRAM(S): at 05:20

## 2023-02-03 NOTE — PROGRESS NOTE ADULT - ASSESSMENT
K better,hypo res  chest pain res  dyspnea res  PAF -doac  MVR  MR  Cardiomegaly  CCHF better see meds - EF 45  sm pl eff - diuretic added this adm  SDAT st mem v poor, neeeds direction but coop  Osteoporosis  comp Fx T12> see ortho, brace, I dont see her in pain    disc c Dtr again this am , arranging dc, poss today     K better,hypo res  chest pain res  dyspnea res  PAF -doac  MVR  MR  Cardiomegaly  CCHF better see meds - EF 45  sm pl eff - diuretic added this adm  SDAT st mem v poor, neeeds direction but coop  Osteoporosis  comp Fx T12> see ortho, brace, I dont see her in pain    disc c Dtr again this am , arranging dc, poss today. Disc GOC, Adv directives, resigned DNR DNI and new MOLST form signed

## 2023-02-03 NOTE — PROGRESS NOTE ADULT - SUBJECTIVE AND OBJECTIVE BOX
CARDIOLOGY     PROGRESS  NOTE   ________________________________________________    CHIEF COMPLAINT:Patient is a 87y old  Female who presents with a chief complaint of chest pain sob (02 Feb 2023 15:21)  doing well  	  REVIEW OF SYSTEMS:  CONSTITUTIONAL: No fever, weight loss, or fatigue  EYES: No eye pain, visual disturbances, or discharge  ENT:  No difficulty hearing, tinnitus, vertigo; No sinus or throat pain  NECK: No pain or stiffness  RESPIRATORY: No cough, wheezing, chills or hemoptysis; No Shortness of Breath  CARDIOVASCULAR: No chest pain, palpitations, passing out, dizziness, or leg swelling  GASTROINTESTINAL: No abdominal or epigastric pain. No nausea, vomiting, or hematemesis; No diarrhea or constipation. No melena or hematochezia.  GENITOURINARY: No dysuria, frequency, hematuria, or incontinence  NEUROLOGICAL: No headaches, memory loss, loss of strength, numbness, or tremors  SKIN: No itching, burning, rashes, or lesions   LYMPH Nodes: No enlarged glands  ENDOCRINE: No heat or cold intolerance; No hair loss  MUSCULOSKELETAL: No joint pain or swelling; No muscle, back, or extremity pain  PSYCHIATRIC: No depression, anxiety, mood swings, or difficulty sleeping  HEME/LYMPH: No easy bruising, or bleeding gums  ALLERGY AND IMMUNOLOGIC: No hives or eczema	    [ ] All others negative	  [ ] Unable to obtain    PHYSICAL EXAM:  T(C): 36.6 (02-02-23 @ 20:38), Max: 36.8 (02-02-23 @ 11:11)  HR: 107 (02-02-23 @ 20:38) (100 - 107)  BP: 116/63 (02-02-23 @ 20:38) (103/67 - 116/63)  RR: 18 (02-02-23 @ 20:38) (18 - 18)  SpO2: 93% (02-02-23 @ 20:38) (93% - 95%)  Wt(kg): --  I&O's Summary    02 Feb 2023 07:01  -  03 Feb 2023 07:00  --------------------------------------------------------  IN: 420 mL / OUT: 0 mL / NET: 420 mL        Appearance: Normal	  HEENT:   Normal oral mucosa, PERRL, EOMI	  Lymphatic: No lymphadenopathy  Cardiovascular: Normal S1 S2, No JVD, +murmurs, No edema  Respiratory: rhonchi  Psychiatry: dementia  Gastrointestinal:  Soft, Non-tender, + BS	  Skin: No rashes, No ecchymoses, No cyanosis	  Neurologic: Non-focal  Extremities: Normal range of motion, No clubbing, cyanosis or edema  Vascular: Peripheral pulses palpable 2+ bilaterally    MEDICATIONS  (STANDING):  acetaminophen     Tablet .. 650 milliGRAM(s) Oral every 8 hours  apixaban 2.5 milliGRAM(s) Oral every 12 hours  cyanocobalamin 1000 MICROGram(s) Oral daily  furosemide    Tablet 20 milliGRAM(s) Oral daily  lisinopril 2.5 milliGRAM(s) Oral daily  metoprolol succinate ER 25 milliGRAM(s) Oral daily  multivitamin 1 Tablet(s) Oral daily  traMADol 25 milliGRAM(s) Oral three times a day      TELEMETRY: 	    ECG:  	  RADIOLOGY:  OTHER: 	  	  LABS:	 	    CARDIAC MARKERS:            02-02    139  |  103  |  26<H>  ----------------------------<  101<H>  3.4<L>   |  29  |  0.57    Ca    8.9      02 Feb 2023 06:35      proBNP: Serum Pro-Brain Natriuretic Peptide: 1368 pg/mL (02-01 @ 06:47)  Serum Pro-Brain Natriuretic Peptide: 2860 pg/mL (01-30 @ 12:16)    Lipid Profile:   HgA1c:   TSH: Thyroid Stimulating Hormone, Serum: 1.02 uIU/mL (02-01 @ 06:54)          Assessment and plan  ---------------------------  Patient is an 87y F PMHx dementia, Afib (on eliquis, metoprolol), 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 Daughter at bedside to help with history. Patient had 3+ pitting edema in BLE last week, has since resolved. Also has been less mobile recently due to worsening DENSON. No hx COPD or other pulmonary condition, no hx CHF. Denies fevers, urinary symptoms, abdominal pain, nvd.  pt with hx of dementia, htn, a,fib on AC with c/o chest pain and sob  CTA of chest noted with no PE with small r sided effusion  ?chf / hfpef  continue all cardiac meds  trop  are all negative  ? also sob can be from hx of asthma, start on inhalers  continue iv Lasix with gentle diuresis  fu lytes  echo noted with MVP and mild global lv dysfunction medical therapy  add losartan 25 mg daily, continue Lasix and beta blocker  a.fib hr controlled continue AC  change Lasix to po, add kcl 10 meq daily  will change losartan to lisinopril 2.5 mg daily  dc planning/ physical therapy

## 2023-02-04 ENCOUNTER — TRANSCRIPTION ENCOUNTER (OUTPATIENT)
Age: 88
End: 2023-02-04

## 2023-02-04 VITALS
TEMPERATURE: 98 F | OXYGEN SATURATION: 95 % | SYSTOLIC BLOOD PRESSURE: 107 MMHG | DIASTOLIC BLOOD PRESSURE: 60 MMHG | RESPIRATION RATE: 18 BRPM | HEART RATE: 88 BPM

## 2023-02-04 PROCEDURE — 87637 SARSCOV2&INF A&B&RSV AMP PRB: CPT

## 2023-02-04 PROCEDURE — 84443 ASSAY THYROID STIM HORMONE: CPT

## 2023-02-04 PROCEDURE — 99285 EMERGENCY DEPT VISIT HI MDM: CPT

## 2023-02-04 PROCEDURE — 84132 ASSAY OF SERUM POTASSIUM: CPT

## 2023-02-04 PROCEDURE — 83605 ASSAY OF LACTIC ACID: CPT

## 2023-02-04 PROCEDURE — 81001 URINALYSIS AUTO W/SCOPE: CPT

## 2023-02-04 PROCEDURE — 84295 ASSAY OF SERUM SODIUM: CPT

## 2023-02-04 PROCEDURE — 93306 TTE W/DOPPLER COMPLETE: CPT

## 2023-02-04 PROCEDURE — 80048 BASIC METABOLIC PNL TOTAL CA: CPT

## 2023-02-04 PROCEDURE — U0005: CPT

## 2023-02-04 PROCEDURE — 84484 ASSAY OF TROPONIN QUANT: CPT

## 2023-02-04 PROCEDURE — 97162 PT EVAL MOD COMPLEX 30 MIN: CPT

## 2023-02-04 PROCEDURE — 85025 COMPLETE CBC W/AUTO DIFF WBC: CPT

## 2023-02-04 PROCEDURE — 85018 HEMOGLOBIN: CPT

## 2023-02-04 PROCEDURE — 82803 BLOOD GASES ANY COMBINATION: CPT

## 2023-02-04 PROCEDURE — 82330 ASSAY OF CALCIUM: CPT

## 2023-02-04 PROCEDURE — 80053 COMPREHEN METABOLIC PANEL: CPT

## 2023-02-04 PROCEDURE — U0003: CPT

## 2023-02-04 PROCEDURE — 87086 URINE CULTURE/COLONY COUNT: CPT

## 2023-02-04 PROCEDURE — 36415 COLL VENOUS BLD VENIPUNCTURE: CPT

## 2023-02-04 PROCEDURE — 71045 X-RAY EXAM CHEST 1 VIEW: CPT

## 2023-02-04 PROCEDURE — 71275 CT ANGIOGRAPHY CHEST: CPT | Mod: MA

## 2023-02-04 PROCEDURE — 83880 ASSAY OF NATRIURETIC PEPTIDE: CPT

## 2023-02-04 PROCEDURE — 82947 ASSAY GLUCOSE BLOOD QUANT: CPT

## 2023-02-04 PROCEDURE — 82435 ASSAY OF BLOOD CHLORIDE: CPT

## 2023-02-04 PROCEDURE — 96374 THER/PROPH/DIAG INJ IV PUSH: CPT

## 2023-02-04 PROCEDURE — 85014 HEMATOCRIT: CPT

## 2023-02-04 RX ORDER — PREGABALIN 225 MG/1
1 CAPSULE ORAL
Qty: 0 | Refills: 0 | DISCHARGE

## 2023-02-04 RX ORDER — LISINOPRIL 2.5 MG/1
1 TABLET ORAL
Qty: 30 | Refills: 0
Start: 2023-02-04 | End: 2023-03-05

## 2023-02-04 RX ORDER — ACETAMINOPHEN 500 MG
2 TABLET ORAL
Qty: 30 | Refills: 0
Start: 2023-02-04 | End: 2023-02-08

## 2023-02-04 RX ORDER — PREGABALIN 225 MG/1
1 CAPSULE ORAL
Qty: 30 | Refills: 0
Start: 2023-02-04 | End: 2023-03-05

## 2023-02-04 RX ORDER — POTASSIUM CHLORIDE 20 MEQ
1 PACKET (EA) ORAL
Qty: 30 | Refills: 0
Start: 2023-02-04 | End: 2023-03-05

## 2023-02-04 RX ORDER — TRAMADOL HYDROCHLORIDE 50 MG/1
0.5 TABLET ORAL
Qty: 7.5 | Refills: 0
Start: 2023-02-04 | End: 2023-02-08

## 2023-02-04 RX ORDER — METOPROLOL TARTRATE 50 MG
1 TABLET ORAL
Qty: 30 | Refills: 0
Start: 2023-02-04 | End: 2023-03-05

## 2023-02-04 RX ORDER — FUROSEMIDE 40 MG
1 TABLET ORAL
Qty: 30 | Refills: 0
Start: 2023-02-04 | End: 2023-03-05

## 2023-02-04 RX ORDER — APIXABAN 2.5 MG/1
1 TABLET, FILM COATED ORAL
Qty: 60 | Refills: 0
Start: 2023-02-04 | End: 2023-03-05

## 2023-02-04 RX ADMIN — Medication 650 MILLIGRAM(S): at 05:28

## 2023-02-04 RX ADMIN — PREGABALIN 1000 MICROGRAM(S): 225 CAPSULE ORAL at 11:37

## 2023-02-04 RX ADMIN — TRAMADOL HYDROCHLORIDE 25 MILLIGRAM(S): 50 TABLET ORAL at 14:34

## 2023-02-04 RX ADMIN — APIXABAN 2.5 MILLIGRAM(S): 2.5 TABLET, FILM COATED ORAL at 05:28

## 2023-02-04 RX ADMIN — Medication 20 MILLIGRAM(S): at 05:29

## 2023-02-04 RX ADMIN — Medication 650 MILLIGRAM(S): at 14:34

## 2023-02-04 RX ADMIN — Medication 25 MILLIGRAM(S): at 05:29

## 2023-02-04 RX ADMIN — Medication 1 TABLET(S): at 11:37

## 2023-02-04 RX ADMIN — TRAMADOL HYDROCHLORIDE 25 MILLIGRAM(S): 50 TABLET ORAL at 16:52

## 2023-02-04 RX ADMIN — Medication 650 MILLIGRAM(S): at 16:51

## 2023-02-04 RX ADMIN — Medication 650 MILLIGRAM(S): at 06:28

## 2023-02-04 RX ADMIN — APIXABAN 2.5 MILLIGRAM(S): 2.5 TABLET, FILM COATED ORAL at 16:51

## 2023-02-04 RX ADMIN — TRAMADOL HYDROCHLORIDE 25 MILLIGRAM(S): 50 TABLET ORAL at 05:32

## 2023-02-04 RX ADMIN — LISINOPRIL 2.5 MILLIGRAM(S): 2.5 TABLET ORAL at 05:29

## 2023-02-04 NOTE — DISCHARGE NOTE PROVIDER - HOSPITAL COURSE
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 Daughter at bedside to help with history. Patient had 3+ pitting edema in BLE last week, has since resolved. Also has been less mobile recently due to worsening DENSON. No hx COPD or other pulmonary condition, no hx CHF. Denies fevers, urinary symptoms, abdominal pain, nvd.  pt with hx of dementia, htn, a,fib on AC with c/o chest pain and sob. CTA of chest noted with no PE with small r sided effusion. 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 s/p IV diuresis. Transitioned to PO Lasix. Trop neg. ECho with  MVP and mild global lv dysfunction medical therapy. Added lisinopril > continue beta blocker.   Discharge, Disposition, medications discussed with ATtending. Medically cleared for discharge to Assisted living facility       Patient is an 87y F PMHx dementia, Afib (on eliquis, metoprolol), 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 Daughter at bedside to help with history. Patient had 3+ pitting edema in BLE last week, has since resolved. Also has been less mobile recently due to worsening DENSON. No hx COPD or other pulmonary condition, no hx CHF. Denies fevers, urinary symptoms, abdominal pain, nvd.  pt with hx of dementia, htn, a,fib on AC with c/o chest pain and sob. CTA of chest noted with no PE with small r sided effusion. 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 s/p IV diuresis. Transitioned to PO Lasix. Trop neg. ECho with  MVP and mild global lv dysfunction medical therapy. Added lisinopril > continued beta blocker. Noted to have T12 chronic vertebral fracture. TLSO brace provided.   Discharge, Disposition, medications discussed with ATtending. Medically cleared for discharge to Assisted living facility       Patient is an 87y F PMHx dementia, Afib (on eliquis, metoprolol), 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 Daughter at bedside to help with history. Patient had 3+ pitting edema in BLE last week, has since resolved. Also has been less mobile recently due to worsening DENSON. No hx COPD or other pulmonary condition, no hx CHF. Denies fevers, urinary symptoms, abdominal pain, nvd.  pt with hx of dementia, htn, a,fib on AC with c/o chest pain and sob. CTA of chest noted with no PE with small r sided effusion. 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 s/p IV diuresis. Transitioned to PO Lasix. Trop neg. ECho with  MVP and mild global lv dysfunction medical therapy. Added lisinopril > continued beta blocker. Noted to have T12 chronic vertebral fracture. TLSO brace provided.   Discharge, Disposition, medications discussed with ATtending. Medically cleared for discharge to Assisted living facility    [facility] per daughter Patient is an 87y F PMHx dementia, Afib (on eliquis, metoprolol), 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 Daughter at bedside to help with history. Patient had 3+ pitting edema in BLE last week, has since resolved. Also has been less mobile recently due to worsening DENSON. No hx COPD or other pulmonary condition, no hx CHF. Denies fevers, urinary symptoms, abdominal pain, nvd.  pt with hx of dementia, htn, a,fib on AC with c/o chest pain and sob. CTA of chest noted with no PE with small r sided effusion. 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 s/p IV diuresis. Transitioned to PO Lasix. Trop neg. ECho with  MVP and mild global lv dysfunction medical therapy. Added lisinopril > continued beta blocker. Noted to have T12 chronic vertebral fracture. TLSO brace provided.   Discharge, Disposition, medications discussed with ATtending. Medically cleared for discharge to Assisted living facility    Discharged to [facility] per daughters arrangements rather than [facility] where pt was living prior to  adm because pt now requires higher level of care, more personal attention and more medication and pain control.

## 2023-02-04 NOTE — PROGRESS NOTE ADULT - SUBJECTIVE AND OBJECTIVE BOX
CARDIOLOGY     PROGRESS  NOTE   ________________________________________________    CHIEF COMPLAINT:Patient is a 87y old  Female who presents with a chief complaint of chest pain sob (03 Feb 2023 08:36)  no complain  	  REVIEW OF SYSTEMS:  CONSTITUTIONAL: No fever, weight loss, or fatigue  EYES: No eye pain, visual disturbances, or discharge  ENT:  No difficulty hearing, tinnitus, vertigo; No sinus or throat pain  NECK: No pain or stiffness  RESPIRATORY: No cough, wheezing, chills or hemoptysis; decrease  Shortness of Breath  CARDIOVASCULAR: No chest pain, palpitations, passing out, dizziness, or leg swelling  GASTROINTESTINAL: No abdominal or epigastric pain. No nausea, vomiting, or hematemesis; No diarrhea or constipation. No melena or hematochezia.  GENITOURINARY: No dysuria, frequency, hematuria, or incontinence  NEUROLOGICAL: No headaches, memory loss, loss of strength, numbness, or tremors  SKIN: No itching, burning, rashes, or lesions   LYMPH Nodes: No enlarged glands  ENDOCRINE: No heat or cold intolerance; No hair loss  MUSCULOSKELETAL: No joint pain or swelling; No muscle, back, or extremity pain  PSYCHIATRIC: No depression, anxiety, mood swings, or difficulty sleeping  HEME/LYMPH: No easy bruising, or bleeding gums  ALLERGY AND IMMUNOLOGIC: No hives or eczema	    [x ] All others negative	  [ ] Unable to obtain    PHYSICAL EXAM:  T(C): 36.6 (02-04-23 @ 05:12), Max: 36.9 (02-03-23 @ 20:14)  HR: 77 (02-04-23 @ 05:12) (77 - 80)  BP: 112/70 (02-04-23 @ 05:12) (91/56 - 112/70)  RR: 18 (02-04-23 @ 05:12) (18 - 18)  SpO2: 93% (02-04-23 @ 05:12) (93% - 94%)  Wt(kg): --  I&O's Summary    03 Feb 2023 07:01  -  04 Feb 2023 07:00  --------------------------------------------------------  IN: 240 mL / OUT: 400 mL / NET: -160 mL        Appearance: Normal	  HEENT:   Normal oral mucosa, PERRL, EOMI	  Lymphatic: No lymphadenopathy  Cardiovascular: Normal S1 S2, No JVD, + murmurs, No edema  Respiratory: rhonchi  Gastrointestinal:  Soft, Non-tender, + BS	  Skin: No rashes, No ecchymoses, No cyanosis	  Neurologic: Non-focal  Extremities: Normal range of motion, No clubbing, cyanosis or edema  Vascular: Peripheral pulses palpable 2+ bilaterally    MEDICATIONS  (STANDING):  acetaminophen     Tablet .. 650 milliGRAM(s) Oral every 8 hours  apixaban 2.5 milliGRAM(s) Oral every 12 hours  cyanocobalamin 1000 MICROGram(s) Oral daily  furosemide    Tablet 20 milliGRAM(s) Oral daily  lisinopril 2.5 milliGRAM(s) Oral daily  metoprolol succinate ER 25 milliGRAM(s) Oral daily  multivitamin 1 Tablet(s) Oral daily  traMADol 25 milliGRAM(s) Oral three times a day      TELEMETRY: 	    ECG:  	  RADIOLOGY:  OTHER: 	  	  LABS:	 	    CARDIAC MARKERS:            02-03    139  |  99  |  28<H>  ----------------------------<  113<H>  3.6   |  28  |  0.62    Ca    9.9      03 Feb 2023 07:03      proBNP: Serum Pro-Brain Natriuretic Peptide: 1368 pg/mL (02-01 @ 06:47)  Serum Pro-Brain Natriuretic Peptide: 2860 pg/mL (01-30 @ 12:16)    Lipid Profile:   HgA1c:   TSH: Thyroid Stimulating Hormone, Serum: 1.02 uIU/mL (02-01 @ 06:54)          Assessment and plan  ---------------------------  Patient is an 87y F PMHx dementia, Afib (on eliquis, metoprolol), 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 Daughter at bedside to help with history. Patient had 3+ pitting edema in BLE last week, has since resolved. Also has been less mobile recently due to worsening DENSON. No hx COPD or other pulmonary condition, no hx CHF. Denies fevers, urinary symptoms, abdominal pain, nvd.  pt with hx of dementia, htn, a,fib on AC with c/o chest pain and sob  CTA of chest noted with no PE with small r sided effusion  ?chf / hfpef  continue all cardiac meds  trop  are all negative  ? also sob can be from hx of asthma, start on inhalers  continue iv Lasix with gentle diuresis  fu lytes  echo noted with MVP and mild global lv dysfunction medical therapy  add losartan 25 mg daily, continue Lasix and beta blocker  a.fib hr controlled continue AC  change Lasix to po, add kcl 10 meq daily  will change losartan to lisinopril 2.5 mg daily  dc planning/ physical therapy ?not dc yet  hr is well controlled

## 2023-02-04 NOTE — DISCHARGE NOTE PROVIDER - NSDCMRMEDTOKEN_GEN_ALL_CORE_FT
acetaminophen 325 mg oral tablet: 2 tab(s) orally every 8 hours, As Needed   cyanocobalamin 1000 mcg oral tablet: 1 tab(s) orally once a day  Eliquis 2.5 mg oral tablet: 1 tab(s) orally 2 times a day  furosemide 20 mg oral tablet: 1 tab(s) orally once a day  K-Tab 10 mEq oral tablet, extended release: 1 tab(s) orally once a day   lisinopril 2.5 mg oral tablet: 1 tab(s) orally once a day  metoprolol succinate 25 mg oral tablet, extended release: 1 tab(s) orally once a day  Multiple Vitamins oral tablet: 1 tab(s) orally once a day  PHysical Therapy: DX: I50.9  PT for evlaution and treatment  traMADol 50 mg oral tablet: 0.5 tab(s) orally 3 times a day MDD:3

## 2023-02-04 NOTE — PROGRESS NOTE ADULT - PROVIDER SPECIALTY LIST ADULT
Cardiology
Family Medicine
Family Medicine
Orthopedics
Cardiology
Cardiology
Family Medicine
Family Medicine

## 2023-02-04 NOTE — DISCHARGE NOTE PROVIDER - PROVIDER TOKENS
PROVIDER:[TOKEN:[[id number]],FOLLOWUP:[1 week]],PROVIDER:[TOKEN:[[id number]],FOLLOWUP:[2 weeks]]

## 2023-02-04 NOTE — DISCHARGE NOTE PROVIDER - CARE PROVIDER_API CALL
George Dewitt)  Family Medicine  [address] [place] [address]  Phone: [phone]  Fax: [phone]  Follow Up Time: 1 week    Vesta Beckford  CARDIOVASCULAR DISEASE  [address] [place] [address], [address]  [place] [address]  Phone: [phone]  Fax: [phone]  Follow Up Time: 2 weeks

## 2023-02-04 NOTE — DISCHARGE NOTE NURSING/CASE MANAGEMENT/SOCIAL WORK - PATIENT PORTAL LINK FT
You can access the FollowMyHealth Patient Portal offered by [facility] by registering at the following website: [email or web address]. By joining [facility]’s FollowMyHealth portal, you will also be able to view your health information using other applications (apps) compatible with our system.

## 2023-02-04 NOTE — DISCHARGE NOTE PROVIDER - NSDCCPCAREPLAN_GEN_ALL_CORE_FT
PRINCIPAL DISCHARGE DIAGNOSIS  Diagnosis: Diastolic CHF, acute on chronic  Assessment and Plan of Treatment: Weigh yourself daily.  If you gain 3lbs in 3 days, or 5lbs in a week call your Health Care Provider.  Do not eat or drink foods containing more than 2000mg of salt (sodium) in your diet every day.  Call your Health Care Provider if you have any swelling or increased swelling in your feet, ankles, and/or stomach.  Take all of your medication as directed.  If you become dizzy call your Health Care Provider.        SECONDARY DISCHARGE DIAGNOSES  Diagnosis: Chronic atrial fibrillation  Assessment and Plan of Treatment: Atrial fibrillation is the most common heart rhythm problem.  The condition puts you at risk for has stroke and heart attack  It helps if you control your blood pressure, not drink more than 1-2 alcohol drinks per day, cut down on caffeine, getting treatment for over active thyroid gland, and get regular exercise  Call your doctor if you feel your heart racing or beating unusually, chest tightness or pain, lightheaded, faint, shortness of breath especially with exercise  It is important to take your heart medication as prescribed  You may be on anticoagulation which is very important to take as directed -      Diagnosis: Dementia  Assessment and Plan of Treatment: continue current medications     PRINCIPAL DISCHARGE DIAGNOSIS  Diagnosis: Diastolic CHF, acute on chronic  Assessment and Plan of Treatment: Weigh yourself daily.  If you gain 3lbs in 3 days, or 5lbs in a week call your Health Care Provider.  Do not eat or drink foods containing more than 2000mg of salt (sodium) in your diet every day.  Call your Health Care Provider if you have any swelling or increased swelling in your feet, ankles, and/or stomach.  Take all of your medication as directed.  If you become dizzy call your Health Care Provider.        SECONDARY DISCHARGE DIAGNOSES  Diagnosis: Chronic atrial fibrillation  Assessment and Plan of Treatment: Atrial fibrillation is the most common heart rhythm problem.  The condition puts you at risk for has stroke and heart attack  It helps if you control your blood pressure, not drink more than 1-2 alcohol drinks per day, cut down on caffeine, getting treatment for over active thyroid gland, and get regular exercise  Call your doctor if you feel your heart racing or beating unusually, chest tightness or pain, lightheaded, faint, shortness of breath especially with exercise  It is important to take your heart medication as prescribed  You may be on anticoagulation which is very important to take as directed -      Diagnosis: Dementia  Assessment and Plan of Treatment: continue current medications    Diagnosis: T12 vertebral fracture  Assessment and Plan of Treatment: noted to have subacute T12 fracture  wear TLSO brace when OOB

## 2023-03-06 NOTE — PROGRESS NOTE ADULT - REASON FOR ADMISSION
chest pain sob
Numbness or tingling

## 2023-03-25 ENCOUNTER — INPATIENT (INPATIENT)
Facility: HOSPITAL | Age: 88
LOS: 5 days | Discharge: NURSING, REHAB OR LONG-TERM CARE | DRG: 194 | End: 2023-03-31
Attending: HOSPITALIST | Admitting: HOSPITALIST
Payer: MEDICARE

## 2023-03-25 VITALS
OXYGEN SATURATION: 96 % | SYSTOLIC BLOOD PRESSURE: 130 MMHG | TEMPERATURE: 100 F | DIASTOLIC BLOOD PRESSURE: 84 MMHG | WEIGHT: 119.93 LBS | RESPIRATION RATE: 18 BRPM | HEART RATE: 86 BPM

## 2023-03-25 DIAGNOSIS — Z98.890 OTHER SPECIFIED POSTPROCEDURAL STATES: Chronic | ICD-10-CM

## 2023-03-25 DIAGNOSIS — J12.3 HUMAN METAPNEUMOVIRUS PNEUMONIA: ICD-10-CM

## 2023-03-25 LAB
ALBUMIN SERPL ELPH-MCNC: 3.4 G/DL — SIGNIFICANT CHANGE UP (ref 3.3–5)
ALP SERPL-CCNC: 98 U/L — SIGNIFICANT CHANGE UP (ref 40–120)
ALT FLD-CCNC: 7 U/L — LOW (ref 10–45)
ANION GAP SERPL CALC-SCNC: 7 MMOL/L — SIGNIFICANT CHANGE UP (ref 5–17)
APPEARANCE UR: CLEAR — SIGNIFICANT CHANGE UP
APTT BLD: 35.6 SEC — HIGH (ref 27.5–35.5)
AST SERPL-CCNC: 18 U/L — SIGNIFICANT CHANGE UP (ref 10–40)
BASOPHILS # BLD AUTO: 0.03 K/UL — SIGNIFICANT CHANGE UP (ref 0–0.2)
BASOPHILS NFR BLD AUTO: 0.5 % — SIGNIFICANT CHANGE UP (ref 0–2)
BILIRUB SERPL-MCNC: 0.4 MG/DL — SIGNIFICANT CHANGE UP (ref 0.2–1.2)
BILIRUB UR-MCNC: NEGATIVE — SIGNIFICANT CHANGE UP
BUN SERPL-MCNC: 20 MG/DL — SIGNIFICANT CHANGE UP (ref 7–23)
CALCIUM SERPL-MCNC: 8.9 MG/DL — SIGNIFICANT CHANGE UP (ref 8.4–10.5)
CHLORIDE SERPL-SCNC: 98 MMOL/L — SIGNIFICANT CHANGE UP (ref 96–108)
CO2 SERPL-SCNC: 29 MMOL/L — SIGNIFICANT CHANGE UP (ref 22–31)
COLOR SPEC: YELLOW — SIGNIFICANT CHANGE UP
COMMENT - URINE: SIGNIFICANT CHANGE UP
CREAT SERPL-MCNC: 0.66 MG/DL — SIGNIFICANT CHANGE UP (ref 0.5–1.3)
DIFF PNL FLD: NEGATIVE — SIGNIFICANT CHANGE UP
EGFR: 85 ML/MIN/1.73M2 — SIGNIFICANT CHANGE UP
EOSINOPHIL # BLD AUTO: 0.05 K/UL — SIGNIFICANT CHANGE UP (ref 0–0.5)
EOSINOPHIL NFR BLD AUTO: 0.9 % — SIGNIFICANT CHANGE UP (ref 0–6)
EPI CELLS # UR: SIGNIFICANT CHANGE UP
GLUCOSE SERPL-MCNC: 111 MG/DL — HIGH (ref 70–99)
GLUCOSE UR QL: NEGATIVE — SIGNIFICANT CHANGE UP
HCT VFR BLD CALC: 36.4 % — SIGNIFICANT CHANGE UP (ref 34.5–45)
HGB BLD-MCNC: 11.8 G/DL — SIGNIFICANT CHANGE UP (ref 11.5–15.5)
HMPV RNA SPEC QL NAA+PROBE: DETECTED
IMM GRANULOCYTES NFR BLD AUTO: 0.3 % — SIGNIFICANT CHANGE UP (ref 0–0.9)
INR BLD: 1.37 RATIO — HIGH (ref 0.88–1.16)
KETONES UR-MCNC: ABNORMAL
LACTATE SERPL-SCNC: 0.8 MMOL/L — SIGNIFICANT CHANGE UP (ref 0.7–2)
LEUKOCYTE ESTERASE UR-ACNC: NEGATIVE — SIGNIFICANT CHANGE UP
LYMPHOCYTES # BLD AUTO: 0.7 K/UL — LOW (ref 1–3.3)
LYMPHOCYTES # BLD AUTO: 12.2 % — LOW (ref 13–44)
MCHC RBC-ENTMCNC: 30.2 PG — SIGNIFICANT CHANGE UP (ref 27–34)
MCHC RBC-ENTMCNC: 32.4 GM/DL — SIGNIFICANT CHANGE UP (ref 32–36)
MCV RBC AUTO: 93.1 FL — SIGNIFICANT CHANGE UP (ref 80–100)
MONOCYTES # BLD AUTO: 0.47 K/UL — SIGNIFICANT CHANGE UP (ref 0–0.9)
MONOCYTES NFR BLD AUTO: 8.2 % — SIGNIFICANT CHANGE UP (ref 2–14)
NEUTROPHILS # BLD AUTO: 4.47 K/UL — SIGNIFICANT CHANGE UP (ref 1.8–7.4)
NEUTROPHILS NFR BLD AUTO: 77.9 % — HIGH (ref 43–77)
NITRITE UR-MCNC: NEGATIVE — SIGNIFICANT CHANGE UP
NRBC # BLD: 0 /100 WBCS — SIGNIFICANT CHANGE UP (ref 0–0)
NT-PROBNP SERPL-SCNC: 1546 PG/ML — HIGH (ref 0–300)
PH UR: 6 — SIGNIFICANT CHANGE UP (ref 5–8)
PLATELET # BLD AUTO: 171 K/UL — SIGNIFICANT CHANGE UP (ref 150–400)
POTASSIUM SERPL-MCNC: 4.2 MMOL/L — SIGNIFICANT CHANGE UP (ref 3.5–5.3)
POTASSIUM SERPL-SCNC: 4.2 MMOL/L — SIGNIFICANT CHANGE UP (ref 3.5–5.3)
PROT SERPL-MCNC: 7.3 G/DL — SIGNIFICANT CHANGE UP (ref 6–8.3)
PROT UR-MCNC: 15
PROTHROM AB SERPL-ACNC: 15.9 SEC — HIGH (ref 10.5–13.4)
RAPID RVP RESULT: DETECTED
RBC # BLD: 3.91 M/UL — SIGNIFICANT CHANGE UP (ref 3.8–5.2)
RBC # FLD: 13.6 % — SIGNIFICANT CHANGE UP (ref 10.3–14.5)
RBC CASTS # UR COMP ASSIST: SIGNIFICANT CHANGE UP /HPF (ref 0–4)
SARS-COV-2 RNA SPEC QL NAA+PROBE: SIGNIFICANT CHANGE UP
SODIUM SERPL-SCNC: 134 MMOL/L — LOW (ref 135–145)
SP GR SPEC: 1.01 — SIGNIFICANT CHANGE UP (ref 1.01–1.02)
UROBILINOGEN FLD QL: NEGATIVE — SIGNIFICANT CHANGE UP
WBC # BLD: 5.74 K/UL — SIGNIFICANT CHANGE UP (ref 3.8–10.5)
WBC # FLD AUTO: 5.74 K/UL — SIGNIFICANT CHANGE UP (ref 3.8–10.5)
WBC UR QL: SIGNIFICANT CHANGE UP /HPF (ref 0–5)

## 2023-03-25 PROCEDURE — 99497 ADVNCD CARE PLAN 30 MIN: CPT | Mod: 25

## 2023-03-25 PROCEDURE — 71045 X-RAY EXAM CHEST 1 VIEW: CPT | Mod: 26

## 2023-03-25 PROCEDURE — 93010 ELECTROCARDIOGRAM REPORT: CPT

## 2023-03-25 PROCEDURE — 99223 1ST HOSP IP/OBS HIGH 75: CPT

## 2023-03-25 PROCEDURE — 99285 EMERGENCY DEPT VISIT HI MDM: CPT

## 2023-03-25 RX ORDER — TRAMADOL HYDROCHLORIDE 50 MG/1
50 TABLET ORAL THREE TIMES A DAY
Refills: 0 | Status: DISCONTINUED | OUTPATIENT
Start: 2023-03-25 | End: 2023-03-31

## 2023-03-25 RX ORDER — METOPROLOL TARTRATE 50 MG
25 TABLET ORAL DAILY
Refills: 0 | Status: DISCONTINUED | OUTPATIENT
Start: 2023-03-25 | End: 2023-03-26

## 2023-03-25 RX ORDER — ONDANSETRON 8 MG/1
4 TABLET, FILM COATED ORAL EVERY 8 HOURS
Refills: 0 | Status: DISCONTINUED | OUTPATIENT
Start: 2023-03-25 | End: 2023-03-31

## 2023-03-25 RX ORDER — LISINOPRIL 2.5 MG/1
2.5 TABLET ORAL DAILY
Refills: 0 | Status: DISCONTINUED | OUTPATIENT
Start: 2023-03-25 | End: 2023-03-26

## 2023-03-25 RX ORDER — FUROSEMIDE 40 MG
20 TABLET ORAL DAILY
Refills: 0 | Status: DISCONTINUED | OUTPATIENT
Start: 2023-03-25 | End: 2023-03-26

## 2023-03-25 RX ORDER — ACETAMINOPHEN 500 MG
650 TABLET ORAL EVERY 6 HOURS
Refills: 0 | Status: DISCONTINUED | OUTPATIENT
Start: 2023-03-25 | End: 2023-03-31

## 2023-03-25 RX ORDER — SODIUM CHLORIDE 9 MG/ML
1000 INJECTION, SOLUTION INTRAVENOUS
Refills: 0 | Status: DISCONTINUED | OUTPATIENT
Start: 2023-03-25 | End: 2023-03-26

## 2023-03-25 RX ORDER — ACETAMINOPHEN 500 MG
650 TABLET ORAL ONCE
Refills: 0 | Status: COMPLETED | OUTPATIENT
Start: 2023-03-25 | End: 2023-03-25

## 2023-03-25 RX ORDER — APIXABAN 2.5 MG/1
2.5 TABLET, FILM COATED ORAL
Refills: 0 | Status: DISCONTINUED | OUTPATIENT
Start: 2023-03-25 | End: 2023-03-31

## 2023-03-25 RX ORDER — LANOLIN ALCOHOL/MO/W.PET/CERES
3 CREAM (GRAM) TOPICAL AT BEDTIME
Refills: 0 | Status: DISCONTINUED | OUTPATIENT
Start: 2023-03-25 | End: 2023-03-31

## 2023-03-25 RX ADMIN — Medication 3 MILLIGRAM(S): at 22:33

## 2023-03-25 RX ADMIN — TRAMADOL HYDROCHLORIDE 50 MILLIGRAM(S): 50 TABLET ORAL at 21:08

## 2023-03-25 RX ADMIN — TRAMADOL HYDROCHLORIDE 50 MILLIGRAM(S): 50 TABLET ORAL at 22:08

## 2023-03-25 RX ADMIN — Medication 650 MILLIGRAM(S): at 16:00

## 2023-03-25 RX ADMIN — Medication 650 MILLIGRAM(S): at 16:30

## 2023-03-25 NOTE — H&P ADULT - ASSESSMENT
87 yr old female with dementia ( AO x 1),CHF, AFIB, T12 compression fx, mitral valve replacement presents from [facility] due to coughing and congestion.      fever , cough, congestion- likely due to viral PNA  unable to be d/c to assisted living since has a room mate and facility wont accept her back with viral PNA  admit to siobhan  unable to go back to assisted living due to viral PNA  in ED - cxr + pna, RVP +  no hypoxia  am labs  aspiration precautions  fall precautions  DASH diet  will hold off abtx since viral  supportive care  Tylenol prn  IV  fluids   02 support prn    HTN -  c/w lisinopril and Toprol   monitor BP'S    CHF-   c/w Lasix    Throrasic compression fracture-  c/w tramadol tid.  per daughter she needs tramadol as standing dose, unable to ask due to dementia, per daughter tramadol standing help with mobility of the patient.     chronic Afib -   c/w Eliquis 2.5 bid    goc -   prior MOLST in chart- DNI. DNR.  d/w daughter, c/w current dnr/dni  time spent 16 min.     vte ppx- on Eliquis     87 yr old female with dementia ( AO x 1),CHF, AFIB, T12 compression fx, mitral valve replacement presents from [facility] due to coughing and congestion.      fever , cough, congestion- likely due to viral PNA  with electrolyte imbalance- hyponatrimia  unable to be d/c to assisted living since has a room mate and facility wont accept her back with viral PNA  admit to siobhan  unable to go back to assisted living due to viral PNA  in ED - cxr + pna, RVP +  no hypoxia  am labs  aspiration precautions  fall precautions  DASH diet  will hold off abtx since viral  supportive care  Tylenol prn  IV  fluids   02 support prn    HTN -  c/w lisinopril and Toprol   monitor BP'S    CHF-   c/w Lasix  Pro-Brain Natriuretic Peptide: 1546 pg/mL (03.25.23 @ 14:25)  per daughter Pro BNP 2 months back- 2400's.  no fluid over-load noted      Throrasic compression fracture-  diagnosed at [facility] 6-8 weeks back, no h/o falls  c/w tramadol tid.  per daughter she needs tramadol as standing dose, unable to ask due to dementia, per daughter tramadol standing help with mobility of the patient.     chronic Afib -   c/w Eliquis 2.5 bid    goc -   prior MOLST in chart- DNI. DNR.  d/w daughter, c/w current dnr/dni  time spent 16 min.     vte ppx- on Eliquis     87 yr old female with dementia ( AO x 1),CHF, AFIB, T12 compression fx, mitral valve replacement [facility] from [facility] due to coughing and congestion.      fever , cough, congestion- likely due to viral PNA  with electrolyte imbalance- hyponatrimia  unable to be d/c to assisted living since has a room mate and facility wont accept her back with viral PNA  admit to siobhan  unable to go back to assisted living due to viral PNA  in ED - cxr + pna, RVP +  no hypoxia  am labs  aspiration precautions  fall precautions  DASH diet  will hold off abtx since viral  supportive care  Tylenol prn  IV  fluids   02 support prn    HTN -  c/w lisinopril and Toprol   monitor BP'S    CHF-   c/w Lasix  Pro-Brain Natriuretic Peptide: 1546 pg/mL (03.25.23 @ 14:25)  per daughter Pro BNP 2 months back- 2400's.  no fluid over-load noted      Throrasic compression fracture-  diagnosed at [facility] 6-8 weeks back, no h/o falls  c/w tramadol tid.  per daughter she needs tramadol as standing dose, unable to ask due to dementia, per daughter tramadol standing help with mobility of the patient.     chronic Afib -   c/w Eliquis 2.5 bid    goc -   prior MOLST in chart- DNI. DNR.  d/w daughter, c/w current dnr/dni  time spent 16 min.     vte ppx- on Eliquis

## 2023-03-25 NOTE — H&P ADULT - NSICDXPASTMEDICALHX_GEN_ALL_CORE_FT
PAST MEDICAL HISTORY:  Benign essential HTN     Chronic atrial fibrillation     Dementia     MVP (mitral valve prolapse)      PAST MEDICAL HISTORY:  Benign essential HTN     Chronic atrial fibrillation     Dementia     MVP (mitral valve prolapse)     Spinal compression fracture

## 2023-03-25 NOTE — ED PROVIDER NOTE - NS ED ATTENDING STATEMENT MOD
This was a shared visit with the AROLDO. I reviewed and verified the documentation and independently performed the documented:

## 2023-03-25 NOTE — H&P ADULT - NSHPPHYSICALEXAM_GEN_ALL_CORE
Vital Signs Last 24 Hrs  T(C): 38.4 (25 Mar 2023 14:10), Max: 38.4 (25 Mar 2023 14:10)  T(F): 101.2 (25 Mar 2023 14:10), Max: 101.2 (25 Mar 2023 14:10)  HR: 86 (25 Mar 2023 12:45) (86 - 86)  BP: 130/84 (25 Mar 2023 12:45) (130/84 - 130/84)  BP(mean): --  RR: 18 (25 Mar 2023 12:45) (18 - 18)  SpO2: 97% (25 Mar 2023 14:10) (96% - 97%)    Parameters below as of 25 Mar 2023 14:10  Patient On (Oxygen Delivery Method): room air    GENERAL- NAD  EAR/NOSE/MOUTH/THROAT - no pharyngeal exudates, no oral lesions,  MMM  EYES- MISSY, conjunctiva and Sclera clear  NECK- supple  RESPIRATORY- scattered right rales +, coarse breath sounds  to auscultation bilaterally, non laboured breathing  CARDIOVASCULAR - SIS2, RRR  GI - soft NT BS present  EXTREMITIES- no pedal edema  NEUROLOGY- no gross focal deficits, co operative, calm, confused  PSYCHIATRY- AAO X 1  MUSCULOSKELETAL- ROM normal

## 2023-03-25 NOTE — ED ADULT NURSE NOTE - OBJECTIVE STATEMENT
Pt BIBA from assisted living facility with c/o cough x several days.  Noted to have nonproductive cough; febrile in ED.  Hx of dementia, pt is alert to self.  SpO2 WNL, no acute respiratory distress noted.  Safety maintained.

## 2023-03-25 NOTE — ED ADULT NURSE NOTE - NSSUHOSCREENINGYN_ED_ALL_ED
Chief Complaint:  No chief complaint on file.      Vitals:  There were no vitals taken for this visit.    HISTORY OF PRESENT ILLNESS     HPI:  Manuel is a 72 year old male patient who presents to the clinic today for a follow up visit. He has a history of nonobstructive CAD, aortic stenosis s/p aortic valve replacement (#25 Lindsay-Blackburn magna ease bovine pericardial valve) 9/2016, FRANK, and pulmonary hypertension. He also has chronic lymphedema. ***       Other significant problems:  Patient Active Problem List   Diagnosis   • Adenoma of large intestine   • Camp's esophagus   • Unspecified sinusitis (chronic)   • Hiatal hernia   • Essential hypertension, benign   • Asthma   • Type II or unspecified type diabetes mellitus without mention of complication, not stated as uncontrolled   • Lymphedema   • Morbid obesity due to excess calories (CMS/[facility])   • AS (aortic stenosis)   • S/P AVR (aortic valve replacement - TISSUE)   • AF (atrial fibrillation) (CMS/[facility])   • Atrial flutter, post op   • FRANK (obstructive sleep apnea)   • Umbilical hernia without obstruction and without gangrene   • History of Camp's esophagus   • Fallen arches   • Osteoarthritis of both ankles and feet   • Gram-positive cocci in clusters   • Bacteremia   • Cardiac pacemaker in situ   • Bilateral leg edema   • Spider veins         PAST MEDICAL, FAMILY AND SOCIAL HISTORY     Medications:  Current Medications    ACETAMINOPHEN (TYLENOL) 325 MG TABLET    Take 325 mg by mouth every 4 hours as needed for Pain.     ALBUTEROL 108 (90 BASE) MCG/ACT INHALER    Inhale 2 puffs into the lungs every 4 hours as needed for Shortness of Breath or Wheezing.    AMLODIPINE (NORVASC) 5 MG TABLET    Take 1 tablet by mouth daily.    AMOXICILLIN (AMOXIL) 500 MG CAPSULE    Take 4 capsules by mouth, i hour before dental appt..    BLOOD GLUCOSE MONITORING SUPPL (FREESTYLE LITE) W/DEVICE KIT    use to test 1 time daily    BLOOD GLUCOSE MONITORING SUPPL W/DEVICE KIT     Test blood sugar 1 times daily.    BLOOD GLUCOSE TEST STRIP    Test blood sugar 1 times daily.    CHOLECALCIFEROL (VITAMIN D) 25 MCG (1,000 UNITS) TABLET    Take by mouth daily.    CLOPIDOGREL (PLAVIX) 75 MG TABLET    Take 1 tablet by mouth daily.    CYANOCOBALAMIN (VITAMIN B-12) 500 MCG TABLET    Take 500 mcg by mouth daily.    FLUOROURACIL (EFUDEX) 5 % CREAM    Apply to affected area of hand twice a day for three additional weeks,  then stop    FLUTICASONE (FLONASE) 50 MCG/ACT NASAL SPRAY    Spray 2 sprays in each nostril daily.    FLUTICASONE-UMECLIDIN-VILANTEROL (TRELEGY ELLIPTA) 200-62.5-25 MCG/ACT INHALER    Inhale 1 puff into the lungs daily.    FOLIC ACID (FOLATE) 1 MG TABLET    Take 1 mg by mouth daily.    FUROSEMIDE (LASIX) 40 MG TABLET    Take 1 tablet by mouth daily.    HYDROCODONE-ACETAMINOPHEN (NORCO) 5-325 MG PER TABLET    Take 1-2 tablets by mouth every 4 hours as needed for Pain.    LANCETS (FREESTYLE) MISC    use to test 1 day    LOSARTAN (COZAAR) 100 MG TABLET    Take 1 tablet by mouth daily.    MAGNESIUM OXIDE PO    Take 250 mg by mouth daily.    METFORMIN (GLUCOPHAGE-XR) 500 MG 24 HR TABLET    Take 1 tablet by mouth daily (with breakfast).    MONTELUKAST (SINGULAIR) 10 MG TABLET    Take 1 tablet by mouth every evening.    NIACINAMIDE 500 MG TABLET    Take 500 mg by mouth daily.    PANTOPRAZOLE (PROTONIX) 40 MG TABLET    Take 1 tablet by mouth daily.    POTASSIUM CHLORIDE (KLOR-CON SPRINKLE) 8 MEQ ER CAPSULE    Take 8 mEq by mouth daily.    RIVAROXABAN (XARELTO) 20 MG TAB    Take 1 tablet by mouth daily (with dinner).    SIMVASTATIN (ZOCOR) 10 MG TABLET    Take 1 tablet by mouth nightly.    SOFTCLIX LANCETS MISC    Test Blood Sugar 1 times daily    SPIRONOLACTONE (ALDACTONE) 25 MG TABLET    Take 1 tablet by mouth 2 times daily.    TRIAMCINOLONE (ARISTOCORT) 0.1 % OINTMENT    Apply twice a day to arms, legs and hands x 2 weeks. Stop. Then as needed for flares    VITAMIN D, ERGOCALCIFEROL, 1.25 MG  (50,000 UNITS) CAPSULE    Take 1 capsule by mouth 1 day a week.    ZINC SULFATE PO    Take 15 mg by mouth daily.       Allergies:  ALLERGIES:   Allergen Reactions   • Aspirin Angioedema     Throat and tongue swelling       Past Medical History/Surgeries:  Past Medical History:   Diagnosis Date   • Adenoma of large intestine    • Allergy    • Anesthesia     Hard IV stick - used U/S machine - anesthesia to do IV   • AS (aortic stenosis) 09/09/2016   • Atrial fibrillation (CMS/HCC)    • Atrial flutter (CMS/HCC)    • CAD (coronary atherosclerotic disease)     non-obstructive    • Cardiac pacemaker in situ 06/17/2021   • Diabetes mellitus (CMS/HCC)    • Difficult intravenous access 2021    Required Triple-lumen central venous catheter morning of surgery (should have PICC placed preop)    • Essential (primary) hypertension    • Fracture 3rd grade    Nose   • Gastroesophageal reflux disease     gillespie   • Heart block AV second degree     intermittent CHB   • Hiatal hernia    • Lymphedema    • Obesity, Class III, BMI 40-49.9 (morbid obesity) (CMS/HCC)    • Pulmonary hypertension (CMS/HCC)     Mild    • RAD (reactive airway disease)    • S/P AVR (aortic valve replacement - TISSUE) 09/09/2016   • Sinusitis, chronic    • Sleep apnea     CPAP uses nightly   • Symptomatic bradycardia    • Wears prescription eyeglasses     For driving only       Past Surgical History:   Procedure Laterality Date   • Aortic valve replacement  09/09/2016    tissue   • Cardiac catherization  08/25/2016    No significant obstructive coronary disease   • Cardiac valve replacement      2016 Aortic Valve REplace.   • Colonoscopy w/ polypectomy  09/15/2011   • Esophagogastroduodenoscopy  12/12/2003   • Foot surgery Left 03/05/2020    left foot triple arthrodesis by Dr. Walter at [facility]   • Hernia repair  2017   • Joint replacement      Left TH   • Pacemaker implant  06/19/2021    dual chamber Biotronik       Family History:  Family History   Problem  Relation Age of Onset   • COPD Mother    • Cancer Father    • Heart Father    • Asthma Sister    • Asthma Brother    • Asthma Sister    • Asthma Sister    • Osteoarthritis Sister        Social History:  Social History     Socioeconomic History   • Marital status: /Civil Union     Spouse name: Hedy   • Number of children: Not on file   • Years of education: Not on file   • Highest education level: Not on file   Occupational History     Employer: OTHER     Comment: [employer] - part time   • Occupation: retired.   Tobacco Use   • Smoking status: Never   • Smokeless tobacco: Never   Vaping Use   • Vaping Use: never used   Substance and Sexual Activity   • Alcohol use: Yes     Alcohol/week: 1.0 - 2.0 standard drink     Types: 1 - 2 Standard drinks or equivalent per week     Comment: once every few weeks   • Drug use: No   • Sexual activity: Not on file   Other Topics Concern   • Not on file   Social History Narrative    Lives with spouse     Social Determinants of Health     Financial Resource Strain: Not on file   Food Insecurity: Not on file   Transportation Needs: Not on file   Physical Activity: Not on file   Stress: Low Risk    • Social Determinants: Stress: Not at all   Social Connections: Not on file   Intimate Partner Violence: Not At Risk   • Social Determinants: Intimate Partner Violence Past Fear: No   • Social Determinants: Intimate Partner Violence Current Fear: No         REVIEW OF SYSTEMS     ***REVIEW OF SYSTEMS:  All other systems reviewed, negative except as noted.  CONSTITUTIONAL:  Denies fever, chills, myalgias, or declining functional capacity.  RESPIRATORY:  Denies cough, dyspnea, wheezing, or sputum production.  CARDIOVASCULAR:  Denies chest pain, chest pressure, or palpitations.  GASTROINTESTINAL:  Denies abdominal pain, nausea, vomiting, bloody stools, constipation, or diarrhea.   GENITOURINARY:  Denies dysuria or flank pain.   MUSCULOSKELETAL:  Denies back pain.   EXTREMITIES:  +  lymphedema.  INTEGUMENT:  Denies rash or excessive bruising.   NEUROLOGICAL:  Denies lightheadedness, dizziness, or near syncopal events.    PHYSICAL EXAM     VITAL SIGNS:    There were no vitals taken for this visit.  ***GENERAL:  Manuel Young Jr. is a 72 year old male who is well developed and well nourished.  He is in no acute distress, nontoxic appearance.   HEENT:  Left carotid:  no bruit, Right carotid: no bruit.  RESPIRATORY:  No respiratory distress, normal breath sounds, no rales, no wheezing.   CARDIOVASCULAR:  Normal rate, regular rhythm, no murmur noted, no gallops, no rubs.   EXTREMITIES:  No clubbing or cyanosis. Lymphedema noted.  Radial pulses are adequate.    Pertinent laboratory tests:  Lab Results   Component Value Date    SODIUM 136 02/09/2023    POTASSIUM 4.3 02/09/2023    CO2 28 02/09/2023    BUN 21 (H) 02/09/2023    CREATININE 1.05 02/09/2023    GFRNA 66 11/11/2019       Lab Results   Component Value Date    AST 18 10/03/2022    GPT 28 10/03/2022    ALKPT 59 10/03/2022    BILIRUBIN 0.6 10/03/2022       Lab Results   Component Value Date    GLUCOSE 116 (H) 02/09/2023    HGBA1C 6.5 (H) 10/03/2022       Lab Results   Component Value Date    WBC 7.2 03/04/2022    HCT 41.4 03/04/2022    HGB 13.7 03/04/2022     03/04/2022    INR 1.1 03/18/2021       Lab Results   Component Value Date    TSH 1.338 02/03/2023       Lab Results   Component Value Date    CHOLESTEROL 140 03/04/2022    HDL 46 03/04/2022    CALCLDL 76 03/04/2022    TRIGLYCERIDE 92 03/04/2022       Lab Results   Component Value Date    BUN 21 (H) 02/09/2023    CREATININE 1.05 02/09/2023    GFRNA 66 11/11/2019       Diagnostic tests reviewed:    *** 12-Lead EKG:  Reviewed    3/17/2021 RAFY: Reviewed    4/18/2022 Echocardiogram:  Reviewed    8/25/2016 Cardiac catheterization:  Reviewed    9/7/2016 Carotid Ultrasound: Reviewed    2/20/2018 Ultrasound Aorta: Reviewed    3/18/2021 CTA Head/Neck: Reviewed (no hemodynamically significant  narrowing bilaterally)      ASSESSMENT/PLAN           #1  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     #2 Aortic Valve Stenosis s/p Aortic Valve Replacement (#25 mm Lindsay-Blackburn magna ease bovine pericardial valve) 9/2016: ***  Last imaged 3/17/2021 showing appropriately functioning valve. He will continue SBE prophylaxis prior to dental work/procedures.      #3 Hypertension: Blood pressure today is ***. ***  Blood pressure today is 126/76. He will continue his current medication regimen.      #3 Non-obstructive Coronary Artery Disease: ***  per catheterization from 2016. He denies any exertional chest pain, chest pressure, or shortness of breath. He will continue Plavix and statin therapy. We will continue to follow clinically.     #4 Post-Operative Atrial Fibrillation: ***  noted post operatively after AVR in 2016. EKG today shows AV sequential pacing.        Follow Up: ***      ***   No - the patient is unable to be screened due to medical condition

## 2023-03-25 NOTE — PATIENT PROFILE ADULT - NSPROPASSIVESMOKEEXPOSURE_GEN_A_NUR
Problem: NORMAL   Goal: Experiences normal transition  Description  INTERVENTIONS:  - Assess and monitor vital signs and lab values.   - Encourage skin-to-skin with caregiver for thermoregulation  - Assess signs, symptoms and risk factors for hypog encouraged. -Reviewed all tests/labs to be completed during  hospital stay. -Routine TCB scheduled for 0500    Parents verbalized understanding and encouraged parents to ask questions. Will continue to monitor. Unknown

## 2023-03-25 NOTE — ED ADULT TRIAGE NOTE - WEIGHT IN LBS
----- Message from Luz Elena Byers MD sent at 7/29/2021  2:01 PM CDT -----  Large baker's cyst noted.  Otherwise looks ok.  Would like her to see ortho for further advise  
Called and left a message asking patient to return call.  
[phone]

## 2023-03-25 NOTE — ED PROVIDER NOTE - OBJECTIVE STATEMENT
87 yr old female with dementia ( AO x 1),CHF, AFIB, T12 compression fx, mitral valve replacement presents from [facility] due to coughing and congestions as per nursing note and daughter at bedside.  No known fever, chills or any other symptoms   Pt DNI. DNR.   daughter at bedside.

## 2023-03-25 NOTE — ED PROVIDER NOTE - ATTENDING APP SHARED VISIT CONTRIBUTION OF CARE
Dr. Coats: I performed a face to face bedside interview with patient regarding history of present illness, review of symptoms and past medical history. I completed an independent physical exam.  I have discussed patient's plan of care with PA.   I agree with note as stated above, having amended the EMR as needed to reflect my findings.   This includes HISTORY OF PRESENT ILLNESS, HIV, PAST MEDICAL/SURGICAL/FAMILY/SOCIAL HISTORY, ALLERGIES AND HOME MEDICATIONS, REVIEW OF SYSTEMS, PHYSICAL EXAM, and any PROGRESS NOTES during the time I functioned as the attending physician for this patient.    dr coats: 87 yr old female with dementia ( AO x 1),CHF, AFIB, T12 compression fx, mitral valve replacement [facility] from [facility] living due to coughing and congestions as per nursing note and daughter at bedside.  No known fever, chills or any other symptoms   Pt DNI. DNR.   daughter at bedside.    clear lungs, abd soft non tender  will check labs, rectal temp,. xray. and swab   labs, imaging all reviewed   xray showing possible viral pna, and + for hmPV , called facility and pt unable to come back with symptoms due to pt with roommate. pt will be admitted to medicine. d/w hospitalist.

## 2023-03-25 NOTE — ED PROVIDER NOTE - CPE EDP CARDIAC NORM
2.5 Mm Punch Excision Text: A 2.5 mm punch biopsy was used to excise the lesion to the level of the subcutaneous fat.  Blunt dissection was used to free the lesion from the surrounding tissues and the lesion was removed. normal...

## 2023-03-25 NOTE — ED ADULT NURSE NOTE - NSIMPLEMENTINTERV_GEN_ALL_ED
Implemented All Fall with Harm Risk Interventions:  [place] to call system. Call bell, personal items and telephone within reach. Instruct patient to call for assistance. Room bathroom lighting operational. Non-slip footwear when patient is off stretcher. Physically safe environment: no spills, clutter or unnecessary equipment. Stretcher in lowest position, wheels locked, appropriate side rails in place. Provide visual cue, wrist band, yellow gown, etc. Monitor gait and stability. Monitor for mental status changes and reorient to person, place, and time. Review medications for side effects contributing to fall risk. Reinforce activity limits and safety measures with patient and family. Provide visual clues: red socks. Implemented All Fall with Harm Risk Interventions:  [place] to call system. Call bell, personal items and telephone within reach. Instruct patient to call for assistance. Room bathroom lighting operational. Non-slip footwear when patient is off stretcher. Physically safe environment: no spills, clutter or unnecessary equipment. Stretcher in lowest position, wheels locked, appropriate side rails in place. Provide visual cue, wrist band, yellow gown, etc. Monitor gait and stability. Monitor for mental status changes and reorient to person, place, and time. Review medications for side effects contributing to fall risk. Reinforce activity limits and safety measures with patient and family. Provide visual clues: red socks. Implemented All Fall with Harm Risk Interventions:  [place] to call system. Call bell, personal items and telephone within reach. Instruct patient to call for assistance. Room bathroom lighting operational. Non-slip footwear when patient is off stretcher. Physically safe environment: no spills, clutter or unnecessary equipment. Stretcher in lowest position, wheels locked, appropriate side rails in place. Provide visual cue, wrist band, yellow gown, etc. Monitor gait and stability. Monitor for mental status changes and reorient to person, place, and time. Review medications for side effects contributing to fall risk. Reinforce activity limits and safety measures with patient and family. Provide visual clues: red socks.

## 2023-03-25 NOTE — H&P ADULT - HISTORY OF PRESENT ILLNESS
87 yr old female with dementia ( AO x 1),CHF, AFIB, T12 compression fx, mitral valve replacement [facility] from [facility] due to coughing and congestions as per nursing note and daughter at bedside.  No known fever, chills or any other symptoms,     prior [facility] in chart- DNI. DNR.    denies pain, dysuria, CP.     in ED - cxr + pna, RVP +, noted to be congested, awake co operative, confused.  87 yr old female with dementia ( AO x 1),CHF, AFIB, T12 compression fx, mitral valve replacement presents from [facility] due to coughing and congestions as per nursing note and daughter at bedside.     prior [facility] in chart- DNI. DNR.    denies pain, dysuria, CP.     in ED - temp 101f, cxr + pna, RVP +, noted to be congested, awake co operative, confused.  87 yr old female with dementia ( AO x 1),CHF, AFIB, T12 compression fx, mitral valve replacement presents from [facility] due to coughing and congestion per daughter.  prior [facility] in chart- DNI. DNR.  denies pain, dysuria, CP.     in ED - temp 101f, cxr + pna, RVP +, noted to be congested, awake co operative, confused.

## 2023-03-25 NOTE — H&P ADULT - NSHPLABSRESULTS_GEN_ALL_CORE
11.8                 134  | 29   | 20           5.74  >-----------< 171     ------------------------< 111                   36.4                 4.2  | 98   | 0.66                                         Ca 8.9   Mg x     Ph x      Urinalysis Basic - ( 25 Mar 2023 14:25 )    Color: Yellow / Appearance: Clear / S.015 / pH: x  Gluc: x / Ketone: Trace  / Bili: Negative / Urobili: Negative   Blood: x / Protein: 15 / Nitrite: Negative   Leuk Esterase: Negative / RBC: 0-1 /HPF / WBC 3-5 /HPF   Sq Epi: x / Non Sq Epi: Neg.-Few / Bacteria: x      Labs reviewed:     CXR personally reviewed: < from: Xray Chest 1 View-PORTABLE IMMEDIATE (23 @ 14:19) >      Impression: Small focus of airspace opacity within the periphery of the   right lower lung. Follow-up imaging is recommended to confirm resolution.   Scattered linear atelectasis at the lung bases bilaterally.    < end of copied text >    ECG reviewed and interpreted: afib  80

## 2023-03-25 NOTE — ED PROVIDER NOTE - CLINICAL SUMMARY MEDICAL DECISION MAKING FREE TEXT BOX
87 yr old female with dementia ( AO x 1),CHF, AFIB, T12 compression fx, mitral valve replacement [facility] from [facility] due to coughing and congestions as per nursing note and daughter at bedside.  No known fever, chills or any other symptoms   Pt DNI. DNR.   daughter at bedside.    clear lungs, abd soft non tender  will check labs, rectal temp,. xray. and swab 87 yr old female with dementia ( AO x 1),CHF, AFIB, T12 compression fx, mitral valve replacement presents from [facility] due to coughing and congestions as per nursing note and daughter at bedside.  No known fever, chills or any other symptoms   Pt DNI. DNR.   daughter at bedside.    clear lungs, abd soft non tender  will check labs, rectal temp,. xray. and swab   labs, imaging all reviewed   xray showing possible viral pna, and + for hmPV , called facility and pt unable to come back with symptoms due to pt with roommate. pt will be admitted to medicine. d/w hospitalist.

## 2023-03-25 NOTE — PATIENT PROFILE ADULT - FALL HARM RISK - HARM RISK INTERVENTIONS
Assistance with ambulation/Assistance OOB with selected safe patient handling equipment/Communicate Risk of Fall with Harm to all staff/Discuss with provider need for PT consult/Monitor for mental status changes/Monitor gait and stability/Move patient closer to nurses' station/Reinforce activity limits and safety measures with patient and family/Reorient to person, place and time as needed/Tailored Fall Risk Interventions/Toileting schedule using arm’s reach rule for commode and bathroom/Use of alarms - bed, chair and/or voice tab/Visual Cue: Yellow wristband and red socks/Bed in lowest position, wheels locked, appropriate side rails in place/Call bell, personal items and telephone in reach/Instruct patient to call for assistance before getting out of bed or chair/Non-slip footwear when patient is out of bed/[place] to call system/Physically safe environment - no spills, clutter or unnecessary equipment/Purposeful Proactive Rounding/Room/bathroom lighting operational, light cord in reach Assistance with ambulation/Assistance OOB with selected safe patient handling equipment/Communicate Risk of Fall with Harm to all staff/Discuss with provider need for PT consult/Monitor for mental status changes/Monitor gait and stability/Move patient closer to nurses' station/Reinforce activity limits and safety measures with patient and family/Reorient to person, place and time as needed/Tailored Fall Risk Interventions/Toileting schedule using arm’s reach rule for commode and bathroom/Use of alarms - bed, chair and/or voice tab/Visual Cue: Yellow wristband and red socks/Bed in lowest position, wheels locked, appropriate side rails in place/Call bell, personal items and telephone in reach/Instruct patient to call for assistance before getting out of bed or chair/Non-slip footwear when patient is out of bed/[place] to call system/Physically safe environment - no spills, clutter or unnecessary equipment/Purposeful Proactive Rounding/Room/bathroom lighting operational, light cord in reach Assistance with ambulation/Assistance OOB with selected safe patient handling equipment/Communicate Risk of Fall with Harm to all staff/Discuss with provider need for PT consult/Monitor for mental status changes/Monitor gait and stability/Move patient closer to nurses' station/Reinforce activity limits and safety measures with patient and family/Reorient to person, place and time as needed/Tailored Fall Risk Interventions/Toileting schedule using arm’s reach rule for commode and bathroom/Use of alarms - bed, chair and/or voice tab/Visual Cue: Yellow wristband and red socks/Bed in lowest position, wheels locked, appropriate side rails in place/Call bell, personal items and telephone in reach/Instruct patient to call for assistance before getting out of bed or chair/Non-slip footwear when patient is out of bed/[place] to call system/Physically safe environment - no spills, clutter or unnecessary equipment/Purposeful Proactive Rounding/Room/bathroom lighting operational, light cord in reach

## 2023-03-26 LAB
ALBUMIN SERPL ELPH-MCNC: 2.9 G/DL — LOW (ref 3.3–5)
ALP SERPL-CCNC: 74 U/L — SIGNIFICANT CHANGE UP (ref 40–120)
ALT FLD-CCNC: 14 U/L — SIGNIFICANT CHANGE UP (ref 10–45)
ANION GAP SERPL CALC-SCNC: 8 MMOL/L — SIGNIFICANT CHANGE UP (ref 5–17)
AST SERPL-CCNC: 21 U/L — SIGNIFICANT CHANGE UP (ref 10–40)
BASOPHILS # BLD AUTO: 0.02 K/UL — SIGNIFICANT CHANGE UP (ref 0–0.2)
BASOPHILS NFR BLD AUTO: 0.3 % — SIGNIFICANT CHANGE UP (ref 0–2)
BILIRUB SERPL-MCNC: 0.5 MG/DL — SIGNIFICANT CHANGE UP (ref 0.2–1.2)
BUN SERPL-MCNC: 20 MG/DL — SIGNIFICANT CHANGE UP (ref 7–23)
CALCIUM SERPL-MCNC: 8.2 MG/DL — LOW (ref 8.4–10.5)
CHLORIDE SERPL-SCNC: 102 MMOL/L — SIGNIFICANT CHANGE UP (ref 96–108)
CO2 SERPL-SCNC: 28 MMOL/L — SIGNIFICANT CHANGE UP (ref 22–31)
CREAT SERPL-MCNC: 0.66 MG/DL — SIGNIFICANT CHANGE UP (ref 0.5–1.3)
CULTURE RESULTS: SIGNIFICANT CHANGE UP
EGFR: 85 ML/MIN/1.73M2 — SIGNIFICANT CHANGE UP
EOSINOPHIL # BLD AUTO: 0 K/UL — SIGNIFICANT CHANGE UP (ref 0–0.5)
EOSINOPHIL NFR BLD AUTO: 0 % — SIGNIFICANT CHANGE UP (ref 0–6)
GLUCOSE SERPL-MCNC: 143 MG/DL — HIGH (ref 70–99)
HCT VFR BLD CALC: 34.5 % — SIGNIFICANT CHANGE UP (ref 34.5–45)
HGB BLD-MCNC: 11.5 G/DL — SIGNIFICANT CHANGE UP (ref 11.5–15.5)
IMM GRANULOCYTES NFR BLD AUTO: 0.3 % — SIGNIFICANT CHANGE UP (ref 0–0.9)
LYMPHOCYTES # BLD AUTO: 0.47 K/UL — LOW (ref 1–3.3)
LYMPHOCYTES # BLD AUTO: 7.6 % — LOW (ref 13–44)
MCHC RBC-ENTMCNC: 30.6 PG — SIGNIFICANT CHANGE UP (ref 27–34)
MCHC RBC-ENTMCNC: 33.3 GM/DL — SIGNIFICANT CHANGE UP (ref 32–36)
MCV RBC AUTO: 91.8 FL — SIGNIFICANT CHANGE UP (ref 80–100)
MONOCYTES # BLD AUTO: 0.5 K/UL — SIGNIFICANT CHANGE UP (ref 0–0.9)
MONOCYTES NFR BLD AUTO: 8.1 % — SIGNIFICANT CHANGE UP (ref 2–14)
NEUTROPHILS # BLD AUTO: 5.15 K/UL — SIGNIFICANT CHANGE UP (ref 1.8–7.4)
NEUTROPHILS NFR BLD AUTO: 83.7 % — HIGH (ref 43–77)
NRBC # BLD: 0 /100 WBCS — SIGNIFICANT CHANGE UP (ref 0–0)
PLATELET # BLD AUTO: 152 K/UL — SIGNIFICANT CHANGE UP (ref 150–400)
POTASSIUM SERPL-MCNC: 3.7 MMOL/L — SIGNIFICANT CHANGE UP (ref 3.5–5.3)
POTASSIUM SERPL-SCNC: 3.7 MMOL/L — SIGNIFICANT CHANGE UP (ref 3.5–5.3)
PROT SERPL-MCNC: 6.3 G/DL — SIGNIFICANT CHANGE UP (ref 6–8.3)
RBC # BLD: 3.76 M/UL — LOW (ref 3.8–5.2)
RBC # FLD: 13.7 % — SIGNIFICANT CHANGE UP (ref 10.3–14.5)
SODIUM SERPL-SCNC: 138 MMOL/L — SIGNIFICANT CHANGE UP (ref 135–145)
SPECIMEN SOURCE: SIGNIFICANT CHANGE UP
WBC # BLD: 6.16 K/UL — SIGNIFICANT CHANGE UP (ref 3.8–10.5)
WBC # FLD AUTO: 6.16 K/UL — SIGNIFICANT CHANGE UP (ref 3.8–10.5)

## 2023-03-26 PROCEDURE — 99232 SBSQ HOSP IP/OBS MODERATE 35: CPT

## 2023-03-26 RX ADMIN — TRAMADOL HYDROCHLORIDE 50 MILLIGRAM(S): 50 TABLET ORAL at 14:24

## 2023-03-26 RX ADMIN — TRAMADOL HYDROCHLORIDE 50 MILLIGRAM(S): 50 TABLET ORAL at 13:11

## 2023-03-26 RX ADMIN — TRAMADOL HYDROCHLORIDE 50 MILLIGRAM(S): 50 TABLET ORAL at 05:42

## 2023-03-26 RX ADMIN — Medication 20 MILLIGRAM(S): at 05:43

## 2023-03-26 RX ADMIN — TRAMADOL HYDROCHLORIDE 50 MILLIGRAM(S): 50 TABLET ORAL at 06:36

## 2023-03-26 RX ADMIN — APIXABAN 2.5 MILLIGRAM(S): 2.5 TABLET, FILM COATED ORAL at 17:50

## 2023-03-26 RX ADMIN — Medication 200 MILLIGRAM(S): at 14:36

## 2023-03-26 RX ADMIN — APIXABAN 2.5 MILLIGRAM(S): 2.5 TABLET, FILM COATED ORAL at 05:43

## 2023-03-26 RX ADMIN — TRAMADOL HYDROCHLORIDE 50 MILLIGRAM(S): 50 TABLET ORAL at 22:50

## 2023-03-26 RX ADMIN — TRAMADOL HYDROCHLORIDE 50 MILLIGRAM(S): 50 TABLET ORAL at 21:58

## 2023-03-26 NOTE — PROGRESS NOTE ADULT - NS ATTEND AMEND GEN_ALL_CORE FT
Tmax 101.2. If no fever for 48hrs will D/C back to intermediate. Tmax 101.2. If no fever for 48hrs will D/C back to group home. Tmax 101.2. If no fever for 48hrs will D/C back to senior living.

## 2023-03-26 NOTE — PROGRESS NOTE ADULT - SUBJECTIVE AND OBJECTIVE BOX
Patient is a 87y old  Female who presents with a chief complaint of cough congestion       Patient seen and examined at bedside. Pleasantly confused. Denies complaints.     ALLERGIES:  No Known Allergies    MEDICATIONS  (STANDING):  apixaban 2.5 milliGRAM(s) Oral two times a day  traMADol 50 milliGRAM(s) Oral three times a day    MEDICATIONS  (PRN):  acetaminophen     Tablet .. 650 milliGRAM(s) Oral every 6 hours PRN Temp greater or equal to 38C (100.4F), Mild Pain (1 - 3)  aluminum hydroxide/magnesium hydroxide/simethicone Suspension 30 milliLiter(s) Oral every 4 hours PRN Dyspepsia  melatonin 3 milliGRAM(s) Oral at bedtime PRN Insomnia  ondansetron Injectable 4 milliGRAM(s) IV Push every 8 hours PRN Nausea and/or Vomiting    Vital Signs Last 24 Hrs  T(F): 98.6 (26 Mar 2023 05:43), Max: 101.2 (25 Mar 2023 14:10)  HR: 96 (26 Mar 2023 05:43) (76 - 96)  BP: 109/71 (26 Mar 2023 05:43) (109/71 - 146/72)  RR: 18 (26 Mar 2023 05:43) (18 - 18)  SpO2: 94% (26 Mar 2023 05:43) (94% - 98%)  I&O's Summary    25 Mar 2023 07:01  -  26 Mar 2023 07:00  --------------------------------------------------------  IN: 840 mL / OUT: 0 mL / NET: 840 mL      PHYSICAL EXAM:  General: NAD, Alert, confused  ENT: MMM, no oral thrush   Neck: Supple, No JVD  Lungs: Clear to auscultation bilaterally, non labored breathing  Cardio: RRR, S1/S2, No murmurs  Abdomen: Soft, Nontender, Nondistended; Bowel sounds present  Extremities: No calf tenderness, No pitting edema    LABS:                        11.5   6.16  )-----------( 152      ( 26 Mar 2023 05:40 )             34.5     03-26    138  |  102  |  20  ----------------------------<  143  3.7   |  28  |  0.66    Ca    8.2      26 Mar 2023 05:40    TPro  6.3  /  Alb  2.9  /  TBili  0.5  /  DBili  x   /  AST  21  /  ALT  14  /  AlkPhos  74        PT/INR - ( 25 Mar 2023 14:25 )   PT: 15.9 sec;   INR: 1.37 ratio         PTT - ( 25 Mar 2023 14:25 )  PTT:35.6 sec  Lactate, Blood: 0.8 mmol/L ( @ 14:25)                          Urinalysis Basic - ( 25 Mar 2023 14:25 )    Color: Yellow / Appearance: Clear / S.015 / pH: x  Gluc: x / Ketone: Trace  / Bili: Negative / Urobili: Negative   Blood: x / Protein: 15 / Nitrite: Negative   Leuk Esterase: Negative / RBC: 0-1 /HPF / WBC 3-5 /HPF   Sq Epi: x / Non Sq Epi: Neg.-Few / Bacteria: x            RADIOLOGY & ADDITIONAL TESTS:  < from: Xray Chest 1 View-PORTABLE IMMEDIATE (23 @ 14:19) >  Impression: Small focus of airspace opacity within the periphery of the   right lower lung. Follow-up imaging is recommended to confirm resolution.   Scattered linear atelectasis at the lung bases bilaterally.    --- End of Report ---            CARRIE JACOBSEN MD; Attending Radiologist  This document has been electronically signed. Mar 25 2023  3:12PM    < end of copied text >    Care Discussed with Consultants/Other Providers:    Patient is a 87y old  Female who presents with a chief complaint of cough congestion       Patient seen and examined at bedside. Pleasantly confused. Denies complaints.     ALLERGIES:  No Known Allergies    MEDICATIONS  (STANDING):  apixaban 2.5 milliGRAM(s) Oral two times a day  traMADol 50 milliGRAM(s) Oral three times a day    MEDICATIONS  (PRN):  acetaminophen     Tablet .. 650 milliGRAM(s) Oral every 6 hours PRN Temp greater or equal to 38C (100.4F), Mild Pain (1 - 3)  aluminum hydroxide/magnesium hydroxide/simethicone Suspension 30 milliLiter(s) Oral every 4 hours PRN Dyspepsia  melatonin 3 milliGRAM(s) Oral at bedtime PRN Insomnia  ondansetron Injectable 4 milliGRAM(s) IV Push every 8 hours PRN Nausea and/or Vomiting    Vital Signs Last 24 Hrs  T(F): 98.6 (26 Mar 2023 05:43), Max: 101.2 (25 Mar 2023 14:10)  HR: 96 (26 Mar 2023 05:43) (76 - 96)  BP: 109/71 (26 Mar 2023 05:43) (109/71 - 146/72)  RR: 18 (26 Mar 2023 05:43) (18 - 18)  SpO2: 94% (26 Mar 2023 05:43) (94% - 98%)  I&O's Summary    25 Mar 2023 07:01  -  26 Mar 2023 07:00  --------------------------------------------------------  IN: 840 mL / OUT: 0 mL / NET: 840 mL      PHYSICAL EXAM:  General: NAD, Alert, confused  ENT: MMM, no oral thrush   Neck: Supple, No JVD  Lungs: Clear to auscultation bilaterally, non labored breathing  Cardio: RRR, S1/S2, No murmurs  Abdomen: Soft, Nontender, Nondistended; Bowel sounds present  Extremities: No calf tenderness, No pitting edema    LABS:                        11.5   6.16  )-----------( 152      ( 26 Mar 2023 05:40 )             34.5     03-26    138  |  102  |  20  ----------------------------<  143  3.7   |  28  |  0.66    Ca    8.2      26 Mar 2023 05:40    TPro  6.3  /  Alb  2.9  /  TBili  0.5  /  DBili  x   /  AST  21  /  ALT  14  /  AlkPhos  74        PT/INR - ( 25 Mar 2023 14:25 )   PT: 15.9 sec;   INR: 1.37 ratio         PTT - ( 25 Mar 2023 14:25 )  PTT:35.6 sec  Lactate, Blood: 0.8 mmol/L ( @ 14:25)                          Urinalysis Basic - ( 25 Mar 2023 14:25 )    Color: Yellow / Appearance: Clear / S.015 / pH: x  Gluc: x / Ketone: Trace  / Bili: Negative / Urobili: Negative   Blood: x / Protein: 15 / Nitrite: Negative   Leuk Esterase: Negative / RBC: 0-1 /HPF / WBC 3-5 /HPF   Sq Epi: x / Non Sq Epi: Neg.-Few / Bacteria: x            RADIOLOGY & ADDITIONAL TESTS:  < from: Xray Chest 1 View-PORTABLE IMMEDIATE (23 @ 14:19) >  Impression: Small focus of airspace opacity within the periphery of the   right lower lung. Follow-up imaging is recommended to confirm resolution.   Scattered linear atelectasis at the lung bases bilaterally.    --- End of Report ---            CARRIE AJCOBSEN MD; Attending Radiologist  This document has been electronically signed. Mar 25 2023  3:12PM    < end of copied text >    Care Discussed with Consultants/Other Providers:

## 2023-03-26 NOTE — PROGRESS NOTE ADULT - ASSESSMENT
87 yr old female with dementia ( AO x 1),CHF, AFIB, T12 compression fx, mitral valve replacement presents from [facility] due to coughing and congestion.      fever , cough, congestion- likely due to viral PNA  with electrolyte imbalance- hyponatremia (resolved)  unable to be d/c to assisted living since has a room mate and facility wont accept her back with viral PNA. Will confirm with case management  cxr + pna, RVP +  aspiration precautions  fall precautions  DASH diet  will hold off abx since viral  supportive care  Tylenol prn  monitor for fever  02 support prn, maintain spO2 > 94%    HTN -  c/w lisinopril and Toprol   monitor BP'S    CHF-   c/w Lasix  Pro-Brain Natriuretic Peptide: 1546 pg/mL (03.25.23 @ 14:25)  per daughter Pro BNP 2 months back- 2400's.  no fluid over-load noted      Thoracic compression fracture  diagnosed at [facility] 6-8 weeks back, no h/o falls  c/w tramadol tid.  per daughter she needs tramadol as standing dose, unable to ask due to dementia, per daughter tramadol standing help with mobility of the patient.     chronic Afib -   c/w Eliquis 2.5 bid    goc -   prior MOLST in chart- DNI. DNR.      vte ppx- on Eliquis    Updated daughter Silvia Flahertyo pending assisted living acceptance for return 87 yr old female with dementia ( AO x 1),CHF, AFIB, T12 compression fx, mitral valve replacement presents from [facility] living due to coughing and congestion.      fever , cough, congestion- likely due to viral PNA  with electrolyte imbalance- hyponatremia (resolved)  unable to be d/c to assisted living since has a room mate and facility wont accept her back with viral PNA. Will confirm with case management  cxr + pna, RVP +  aspiration precautions  fall precautions  DASH diet  will hold off abx since viral  supportive care  Tylenol prn  monitor for fever  02 support prn, maintain spO2 > 94%    HTN -  c/w lisinopril and Toprol   monitor BP'S    CHF-   c/w Lasix  Pro-Brain Natriuretic Peptide: 1546 pg/mL (03.25.23 @ 14:25)  per daughter Pro BNP 2 months back- 2400's.  no fluid over-load noted      Thoracic compression fracture  diagnosed at [facility] 6-8 weeks back, no h/o falls  c/w tramadol tid.  per daughter she needs tramadol as standing dose, unable to ask due to dementia, per daughter tramadol standing help with mobility of the patient.     chronic Afib -   c/w Eliquis 2.5 bid    goc -   prior MOLST in chart- DNI. DNR.      vte ppx- on Eliquis    Updated daughter Silvia Flahertyo pending assisted living acceptance for return 87 yr old female with dementia ( AO x 1),CHF, AFIB, T12 compression fx, mitral valve replacement presents from [facility] living due to coughing and congestion.      fever , cough, congestion- likely due to viral PNA  with electrolyte imbalance- hyponatremia (resolved)  unable to be d/c to assisted living since has a room mate and facility wont accept her back with viral PNA. Will confirm with case management  cxr + pna, RVP +  aspiration precautions  fall precautions  DASH diet  will hold off abx since viral  supportive care  Tylenol prn  monitor for fever  02 support prn, maintain spO2 > 94%    HTN -  c/w lisinopril and Toprol   monitor BP'S    CHF-   c/w Lasix  Pro-Brain Natriuretic Peptide: 1546 pg/mL (03.25.23 @ 14:25)  per daughter Pro BNP 2 months back- 2400's.  no fluid over-load noted      Thoracic compression fracture  diagnosed at [facility] 6-8 weeks back, no h/o falls  c/w tramadol tid.  per daughter she needs tramadol as standing dose, unable to ask due to dementia, per daughter tramadol standing help with mobility of the patient.     chronic Afib -   c/w Eliquis 2.5 bid    goc -   prior MOLST in chart- DNI. DNR.      vte ppx- on Eliquis    Updated daughter Silvia Flahertyo pending assisted living acceptance for return 87 yr old female with dementia ( AO x 1),CHF, AFIB, T12 compression fx, mitral valve replacement presents from assistant living due to coughing and congestion. From [facility]    Fever , cough, congestion- likely due to viral PNA  with electrolyte imbalance- hyponatremia (resolved)  unable to be d/c to assisted living since has a room mate and facility wont accept her back with viral PNA. Will confirm with case management  cxr + pna, RVP +  aspiration precautions  fall precautions  DASH diet  will hold off abx since viral  supportive care  Tylenol prn  monitor for fever  02 support prn, maintain spO2 > 94%    HTN -  c/w lisinopril and Toprol   monitor BP'S    CHF-   c/w Lasix  Pro-Brain Natriuretic Peptide: 1546 pg/mL (03.25.23 @ 14:25)  per daughter Pro BNP 2 months back- 2400's.  no fluid over-load noted    Thoracic compression fracture  diagnosed at [facility] 6-8 weeks back, no h/o falls  c/w tramadol tid.  per daughter she needs tramadol as standing dose, unable to ask due to dementia, per daughter tramadol standing help with mobility of the patient.     chronic Afib -   c/w Eliquis 2.5 bid    goc -   prior MOLST in chart- DNI. DNR.      vte ppx- on Eliquis    Updated daughter Silvia   Dispo pending assisted living acceptance for return 87 yr old female with dementia ( AO x 1),CHF, AFIB, T12 compression fx, mitral valve replacement presents from [facility] living due to coughing and congestion. From [facility]    Fever , cough, congestion- likely due to viral PNA  with electrolyte imbalance- hyponatremia (resolved)  unable to be d/c to assisted living since has a room mate and facility wont accept her back with viral PNA. Will confirm with case management  cxr + pna, RVP +  aspiration precautions  fall precautions  DASH diet  will hold off abx since viral  supportive care  Tylenol prn  monitor for fever  02 support prn, maintain spO2 > 94%    HTN -  c/w lisinopril and Toprol   monitor BP'S    CHF-   c/w Lasix  Pro-Brain Natriuretic Peptide: 1546 pg/mL (03.25.23 @ 14:25)  per daughter Pro BNP 2 months back- 2400's.  no fluid over-load noted    Thoracic compression fracture  diagnosed at [facility] 6-8 weeks back, no h/o falls  c/w tramadol tid.  per daughter she needs tramadol as standing dose, unable to ask due to dementia, per daughter tramadol standing help with mobility of the patient.     chronic Afib -   c/w Eliquis 2.5 bid    goc -   prior MOLST in chart- DNI. DNR.      vte ppx- on Eliquis    Updated daughter Silvia   Dispo pending assisted living acceptance for return 87 yr old female with dementia ( AO x 1),CHF, AFIB, T12 compression fx, mitral valve replacement presents from assistant living due to coughing and congestion. From [facility]    Fever , cough, congestion- likely due to viral PNA  with electrolyte imbalance- hyponatremia (resolved)  unable to be d/c to assisted living since has a room mate and facility wont accept her back with viral PNA. Will confirm with case management  cxr + pna, RVP +  aspiration precautions  fall precautions  DASH diet  will hold off abx since viral  supportive care  Tylenol prn  monitor for fever  02 support prn, maintain spO2 > 94%    HTN -  c/w lisinopril and Toprol   monitor BP'S    CHF-   c/w Lasix  Pro-Brain Natriuretic Peptide: 1546 pg/mL (03.25.23 @ 14:25)  per daughter Pro BNP 2 months back- 2400's.  no fluid over-load noted    Thoracic compression fracture  diagnosed at [facility] 6-8 weeks back, no h/o falls  c/w tramadol tid.  per daughter she needs tramadol as standing dose, unable to ask due to dementia, per daughter tramadol standing help with mobility of the patient.     chronic Afib -   c/w Eliquis 2.5 bid    goc -   prior MOLST in chart- DNI. DNR.      vte ppx- on Eliquis    Updated daughter Silvia   Dispo pending assisted living acceptance for return

## 2023-03-27 PROCEDURE — 99232 SBSQ HOSP IP/OBS MODERATE 35: CPT

## 2023-03-27 RX ADMIN — TRAMADOL HYDROCHLORIDE 50 MILLIGRAM(S): 50 TABLET ORAL at 18:02

## 2023-03-27 RX ADMIN — Medication 1200 MILLIGRAM(S): at 17:58

## 2023-03-27 RX ADMIN — APIXABAN 2.5 MILLIGRAM(S): 2.5 TABLET, FILM COATED ORAL at 17:58

## 2023-03-27 RX ADMIN — TRAMADOL HYDROCHLORIDE 50 MILLIGRAM(S): 50 TABLET ORAL at 17:58

## 2023-03-27 RX ADMIN — TRAMADOL HYDROCHLORIDE 50 MILLIGRAM(S): 50 TABLET ORAL at 22:34

## 2023-03-27 RX ADMIN — TRAMADOL HYDROCHLORIDE 50 MILLIGRAM(S): 50 TABLET ORAL at 21:42

## 2023-03-27 RX ADMIN — Medication 200 MILLIGRAM(S): at 06:15

## 2023-03-27 RX ADMIN — APIXABAN 2.5 MILLIGRAM(S): 2.5 TABLET, FILM COATED ORAL at 06:15

## 2023-03-27 RX ADMIN — TRAMADOL HYDROCHLORIDE 50 MILLIGRAM(S): 50 TABLET ORAL at 07:15

## 2023-03-27 RX ADMIN — TRAMADOL HYDROCHLORIDE 50 MILLIGRAM(S): 50 TABLET ORAL at 06:15

## 2023-03-27 NOTE — PROGRESS NOTE ADULT - ASSESSMENT
87 yr old female with dementia ( AO x 1),CHF, AFIB, T12 compression fx, mitral valve replacement presents from [facility] living due to coughing and congestion. From [facility]    Fever , cough, congestion- likely due to viral PNA  with electrolyte imbalance- hyponatremia (resolved)  unable to be d/c to assisted living since has a room mate and facility wont accept her back with viral PNA. Will confirm with case management  cxr + pna, RVP +  aspiration precautions  fall precautions  DASH diet  will hold off abx since viral  supportive care  Tylenol prn  monitor for fever  02 support prn, maintain spO2 > 94%    HTN -  c/w lisinopril and Toprol   monitor BP'S    CHF-   c/w Lasix  Pro-Brain Natriuretic Peptide: 1546 pg/mL (03.25.23 @ 14:25)  per daughter Pro BNP 2 months back- 2400's.  no fluid over-load noted    Thoracic compression fracture  diagnosed at [facility] 6-8 weeks back, no h/o falls  c/w tramadol tid.  per daughter she needs tramadol as standing dose, unable to ask due to dementia, per daughter tramadol standing help with mobility of the patient.     chronic Afib -   c/w Eliquis 2.5 bid    goc -   prior MOLST in chart- DNI. DNR.      vte ppx- on Eliquis    Updated daughter Silvia   Dispo pending assisted living acceptance for return 87 yr old female with dementia ( AO x 1),CHF, AFIB, T12 compression fx, mitral valve replacement presents from [facility] living due to coughing and congestion. From [facility]    Fever , cough, congestion- likely due to viral PNA  with electrolyte imbalance- hyponatremia (resolved)  unable to be d/c to assisted living since has a room mate and facility wont accept her back with viral PNA. Will confirm with case management  cxr + pna, RVP +  aspiration precautions  fall precautions  DASH diet  will hold off abx since viral  supportive care  Tylenol prn  monitor for fever  02 support prn, maintain spO2 > 94%    HTN -  c/w lisinopril and Toprol   monitor BP'S    CHF-   c/w Lasix  Pro-Brain Natriuretic Peptide: 1546 pg/mL (03.25.23 @ 14:25)  per daughter Pro BNP 2 months back- 2400's.  no fluid over-load noted    Thoracic compression fracture  diagnosed at [facility] 6-8 weeks back, no h/o falls  c/w tramadol tid.  per daughter she needs tramadol as standing dose, unable to ask due to dementia, per daughter tramadol standing help with mobility of the patient.     chronic Afib -   c/w Eliquis 2.5 bid    goc -   prior MOLST in chart- DNI. DNR.      vte ppx- on Eliquis    Updated daughter Silvia   Dispo pending assisted living acceptance for return 87 yr old female with dementia ( AO x 1),CHF, AFIB, T12 compression fx, mitral valve replacement presents from [facility] living due to coughing and congestion. From [facility]    Fever , cough, congestion- likely due to viral PNA  with electrolyte imbalance- hyponatremia (resolved)  unable to be d/c to assisted living since has a room mate and facility wont accept her back with viral PNA. Will confirm with case management  cxr + pna, RVP +  aspiration precautions  fall precautions  DASH diet  will hold off abx since viral  supportive care  Tylenol prn  monitor for fever  02 support prn, maintain spO2 > 94%    HTN -  c/w lisinopril and Toprol   monitor BP'S    CHF-   c/w Lasix  Pro-Brain Natriuretic Peptide: 1546 pg/mL (03.25.23 @ 14:25)  per daughter Pro BNP 2 months back- 2400's.  no fluid over-load noted    Thoracic compression fracture  diagnosed at [facility] 6-8 weeks back, no h/o falls  c/w tramadol tid.  per daughter she needs tramadol as standing dose, unable to ask due to dementia, per daughter tramadol standing help with mobility of the patient.     chronic Afib -   c/w Eliquis 2.5 bid    goc -   prior MOLST in chart- DNI. DNR.      vte ppx- on Eliquis    Updated daughter Silvia   Dispo pending assisted living acceptance for return 87 yr old female with dementia ( AO x 1),CHF, AFIB, T12 compression fx, mitral valve replacement presents from assistant living due to coughing and congestion. From [facility]    Fever , cough, congestion- likely due to viral PNA  with electrolyte imbalance- hyponatremia (resolved)  unable to be d/c to assisted living since has a room mate and facility wont accept her back with viral PNA. Will confirm with case management  cxr + pna, RVP +  aspiration precautions  wet congestive cough- Mucinex 1200mg bid   fall precautions  DASH diet   hold off abx since viral  supportive care  Tylenol prn  monitor for fever  02 support prn, maintain spO2 > 94%    HTN -  c/w lisinopril and Toprol   monitor BP'S    CHF-   c/w Lasix  Pro-Brain Natriuretic Peptide: 1546 pg/mL (03.25.23 @ 14:25)  per daughter Pro BNP 2 months back- 2400's.  no fluid over-load noted    Thoracic compression fracture  diagnosed at [facility] 6-8 weeks back, no h/o falls  c/w tramadol tid.  per daughter she needs tramadol as standing dose, unable to ask due to dementia, per daughter tramadol standing help with mobility of the patient.     chronic Afib -   c/w Eliquis 2.5 bid    goc -   prior MOLST in chart- DNI. DNR.      vte ppx- on Eliquis    3/27 Updated daughter Silvia Hayes pending assisted living acceptance for return 87 yr old female with dementia ( AO x 1),CHF, AFIB, T12 compression fx, mitral valve replacement presents from assistant living due to coughing and congestion. From [facility]    Fever , cough, congestion- likely due to viral PNA  with electrolyte imbalance- hyponatremia (resolved)  unable to be d/c to assisted living since has a room mate and facility wont accept her back with viral PNA. Will confirm with case management  cxr + pna, RVP +  aspiration precautions  wet congestive cough- Mucinex 1200mg bid   fall precautions  DASH diet   hold off abx since viral  supportive care  Tylenol prn  monitor for fever  02 support prn, maintain spO2 > 94%    HTN -  c/w lisinopril and Toprol   monitor BP'S    CHF-   c/w Lasix  Pro-Brain Natriuretic Peptide: 1546 pg/mL (03.25.23 @ 14:25)  per daughter Pro BNP 2 months back- 2400's.  no fluid over-load noted    Thoracic compression fracture  diagnosed at [facility] 6-8 weeks back, no h/o falls  c/w tramadol tid.  per daughter she needs tramadol as standing dose, unable to ask due to dementia, per daughter tramadol standing help with mobility of the patient.     chronic Afib -   c/w Eliquis 2.5 bid    goc -   prior MOLST in chart- DNI. DNR.      vte ppx- on Eliquis    3/27 Updated daughter Silvia Hayes pending assisted living acceptance for return 87 yr old female with dementia ( AO x 1),CHF, AFIB, T12 compression fx, mitral valve replacement presents from assistant living due to coughing and congestion. From [facility]    Fever , cough, congestion- likely due to viral PNA  with electrolyte imbalance- hyponatremia (resolved)  unable to be d/c to assisted living since has a room mate and facility wont accept her back with viral PNA. Will confirm with case management  cxr + pna, RVP +  aspiration precautions  wet congestive cough- Mucinex 1200mg bid   fall precautions  DASH diet   hold off abx since viral  supportive care  Tylenol prn  monitor for fever  02 support prn, maintain spO2 > 94%    HTN -  c/w lisinopril and Toprol   monitor BP'S    CHF-   c/w Lasix  Pro-Brain Natriuretic Peptide: 1546 pg/mL (03.25.23 @ 14:25)  per daughter Pro BNP 2 months back- 2400's.  no fluid over-load noted    Thoracic compression fracture  diagnosed at [facility] 6-8 weeks back, no h/o falls  c/w tramadol tid.  per daughter she needs tramadol as standing dose, unable to ask due to dementia, per daughter tramadol standing help with mobility of the patient.     chronic Afib -   c/w Eliquis 2.5 bid    goc -   prior MOLST in chart- DNI. DNR.      vte ppx- on Eliquis    3/27 Updated daughter Silvia Hayes pending assisted living acceptance for return

## 2023-03-27 NOTE — PROGRESS NOTE ADULT - SUBJECTIVE AND OBJECTIVE BOX
Patient is a 87y old  Female who presents with a chief complaint of cough congestion (26 Mar 2023 11:33)      Patient seen and examined at bedside.    ALLERGIES:  No Known Allergies    MEDICATIONS  (STANDING):  apixaban 2.5 milliGRAM(s) Oral two times a day  traMADol 50 milliGRAM(s) Oral three times a day    MEDICATIONS  (PRN):  acetaminophen     Tablet .. 650 milliGRAM(s) Oral every 6 hours PRN Temp greater or equal to 38C (100.4F), Mild Pain (1 - 3)  aluminum hydroxide/magnesium hydroxide/simethicone Suspension 30 milliLiter(s) Oral every 4 hours PRN Dyspepsia  guaiFENesin Oral Liquid (Sugar-Free) 200 milliGRAM(s) Oral every 6 hours PRN Cough  melatonin 3 milliGRAM(s) Oral at bedtime PRN Insomnia  ondansetron Injectable 4 milliGRAM(s) IV Push every 8 hours PRN Nausea and/or Vomiting    Vital Signs Last 24 Hrs  T(F): 98.1 (27 Mar 2023 05:10), Max: 98.1 (27 Mar 2023 05:10)  HR: 83 (27 Mar 2023 05:10) (83 - 109)  BP: 130/60 (27 Mar 2023 05:10) (106/80 - 130/60)  RR: 16 (27 Mar 2023 05:10) (16 - 18)  SpO2: 97% (27 Mar 2023 05:10) (93% - 97%)  I&O's Summary    PHYSICAL EXAM:  General: NAD, A/O x 3  ENT: MMM  Neck: Supple, No JVD  Lungs: Clear to auscultation bilaterally, Non labored breathing   Cardio: RRR, S1/S2, No murmurs  Abdomen: Soft, Nontender, Nondistended; Bowel sounds present  Extremities: No calf tenderness, No pitting edema    LABS:                        11.5   6.16  )-----------( 152      ( 26 Mar 2023 05:40 )             34.5     -    138  |  102  |  20  ----------------------------<  143  3.7   |  28  |  0.66    Ca    8.2      26 Mar 2023 05:40    TPro  6.3  /  Alb  2.9  /  TBili  0.5  /  DBili  x   /  AST  21  /  ALT  14  /  AlkPhos  74  -      PT/INR - ( 25 Mar 2023 14:25 )   PT: 15.9 sec;   INR: 1.37 ratio         PTT - ( 25 Mar 2023 14:25 )  PTT:35.6 sec  Lactate, Blood: 0.8 mmol/L ( @ 14:25)                          Urinalysis Basic - ( 25 Mar 2023 14:25 )    Color: Yellow / Appearance: Clear / S.015 / pH: x  Gluc: x / Ketone: Trace  / Bili: Negative / Urobili: Negative   Blood: x / Protein: 15 / Nitrite: Negative   Leuk Esterase: Negative / RBC: 0-1 /HPF / WBC 3-5 /HPF   Sq Epi: x / Non Sq Epi: Neg.-Few / Bacteria: x        Culture - Urine (collected 25 Mar 2023 14:25)  Source: Clean Catch Clean Catch (Midstream)  Final Report (26 Mar 2023 17:35):    <10,000 CFU/mL Normal Urogenital Nancy    Culture - Blood (collected 25 Mar 2023 14:25)  Source: .Blood Blood-Peripheral  Preliminary Report (26 Mar 2023 22:02):    No growth to date.    Culture - Blood (collected 25 Mar 2023 14:25)  Source: .Blood Blood-Peripheral  Preliminary Report (26 Mar 2023 22:02):    No growth to date.        RADIOLOGY & ADDITIONAL TESTS:    Care Discussed with Consultants/Other Providers:    Patient is a 87y old  Female who presents with a chief complaint of cough congestion (26 Mar 2023 11:33)      Patient seen and examined at bedside.  Pt with wet congestive cough.  Pt pleasantly demented     ALLERGIES:  No Known Allergies    MEDICATIONS  (STANDING):  apixaban 2.5 milliGRAM(s) Oral two times a day  traMADol 50 milliGRAM(s) Oral three times a day    MEDICATIONS  (PRN):  acetaminophen     Tablet .. 650 milliGRAM(s) Oral every 6 hours PRN Temp greater or equal to 38C (100.4F), Mild Pain (1 - 3)  aluminum hydroxide/magnesium hydroxide/simethicone Suspension 30 milliLiter(s) Oral every 4 hours PRN Dyspepsia  guaiFENesin Oral Liquid (Sugar-Free) 200 milliGRAM(s) Oral every 6 hours PRN Cough  melatonin 3 milliGRAM(s) Oral at bedtime PRN Insomnia  ondansetron Injectable 4 milliGRAM(s) IV Push every 8 hours PRN Nausea and/or Vomiting    Vital Signs Last 24 Hrs  T(F): 98.1 (27 Mar 2023 05:10), Max: 98.1 (27 Mar 2023 05:10)  HR: 83 (27 Mar 2023 05:10) (83 - 109)  BP: 130/60 (27 Mar 2023 05:10) (106/80 - 130/60)  RR: 16 (27 Mar 2023 05:10) (16 - 18)  SpO2: 97% (27 Mar 2023 05:10) (93% - 97%)  I&O's Summary    PHYSICAL EXAM:  General: 88 y/o female in NAD, A/O x 1-2  Pulse OX 96 % RA   ENT: MMM  Neck: Supple, No JVD  Lungs: Course bs to auscultation bilaterally, Non labored breathing   Cardio: RRR, S1/S2, No murmurs  Abdomen: Soft, Nontender, Nondistended; Bowel sounds present  Extremities: No calf tenderness, No pitting edema    LABS:                        11.5   6.16  )-----------( 152      ( 26 Mar 2023 05:40 )             34.5     03-    138  |  102  |  20  ----------------------------<  143  3.7   |  28  |  0.66    Ca    8.2      26 Mar 2023 05:40    TPro  6.3  /  Alb  2.9  /  TBili  0.5  /  DBili  x   /  AST  21  /  ALT  14  /  AlkPhos  74        PT/INR - ( 25 Mar 2023 14:25 )   PT: 15.9 sec;   INR: 1.37 ratio         PTT - ( 25 Mar 2023 14:25 )  PTT:35.6 sec  Lactate, Blood: 0.8 mmol/L ( @ 14:25)                          Urinalysis Basic - ( 25 Mar 2023 14:25 )    Color: Yellow / Appearance: Clear / S.015 / pH: x  Gluc: x / Ketone: Trace  / Bili: Negative / Urobili: Negative   Blood: x / Protein: 15 / Nitrite: Negative   Leuk Esterase: Negative / RBC: 0-1 /HPF / WBC 3-5 /HPF   Sq Epi: x / Non Sq Epi: Neg.-Few / Bacteria: x        Culture - Urine (collected 25 Mar 2023 14:25)  Source: Clean Catch Clean Catch (Midstream)  Final Report (26 Mar 2023 17:35):    <10,000 CFU/mL Normal Urogenital Nancy    Culture - Blood (collected 25 Mar 2023 14:25)  Source: .Blood Blood-Peripheral  Preliminary Report (26 Mar 2023 22:02):    No growth to date.    Culture - Blood (collected 25 Mar 2023 14:25)  Source: .Blood Blood-Peripheral  Preliminary Report (26 Mar 2023 22:02):    No growth to date.        RADIOLOGY & ADDITIONAL TESTS:    Care Discussed with Consultants/Other Providers:    Patient is a 87y old  Female who presents with a chief complaint of cough congestion (26 Mar 2023 11:33)      Patient seen and examined at bedside.  Pt with wet congestive cough.  Pt pleasantly demented     ALLERGIES:  No Known Allergies    MEDICATIONS  (STANDING):  apixaban 2.5 milliGRAM(s) Oral two times a day  traMADol 50 milliGRAM(s) Oral three times a day    MEDICATIONS  (PRN):  acetaminophen     Tablet .. 650 milliGRAM(s) Oral every 6 hours PRN Temp greater or equal to 38C (100.4F), Mild Pain (1 - 3)  aluminum hydroxide/magnesium hydroxide/simethicone Suspension 30 milliLiter(s) Oral every 4 hours PRN Dyspepsia  guaiFENesin Oral Liquid (Sugar-Free) 200 milliGRAM(s) Oral every 6 hours PRN Cough  melatonin 3 milliGRAM(s) Oral at bedtime PRN Insomnia  ondansetron Injectable 4 milliGRAM(s) IV Push every 8 hours PRN Nausea and/or Vomiting    Vital Signs Last 24 Hrs  T(F): 98.1 (27 Mar 2023 05:10), Max: 98.1 (27 Mar 2023 05:10)  HR: 83 (27 Mar 2023 05:10) (83 - 109)  BP: 130/60 (27 Mar 2023 05:10) (106/80 - 130/60)  RR: 16 (27 Mar 2023 05:10) (16 - 18)  SpO2: 97% (27 Mar 2023 05:10) (93% - 97%)  I&O's Summary    PHYSICAL EXAM:  General: 86 y/o female in NAD, A/O x 1-2  Pulse OX 96 % RA   ENT: MMM  Neck: Supple, No JVD  Lungs: Course bs to auscultation bilaterally, Non labored breathing   Cardio: RRR, S1/S2, No murmurs  Abdomen: Soft, Nontender, Nondistended; Bowel sounds present  Extremities: No calf tenderness, No pitting edema    LABS:                        11.5   6.16  )-----------( 152      ( 26 Mar 2023 05:40 )             34.5     03-    138  |  102  |  20  ----------------------------<  143  3.7   |  28  |  0.66    Ca    8.2      26 Mar 2023 05:40    TPro  6.3  /  Alb  2.9  /  TBili  0.5  /  DBili  x   /  AST  21  /  ALT  14  /  AlkPhos  74        PT/INR - ( 25 Mar 2023 14:25 )   PT: 15.9 sec;   INR: 1.37 ratio         PTT - ( 25 Mar 2023 14:25 )  PTT:35.6 sec  Lactate, Blood: 0.8 mmol/L ( @ 14:25)                          Urinalysis Basic - ( 25 Mar 2023 14:25 )    Color: Yellow / Appearance: Clear / S.015 / pH: x  Gluc: x / Ketone: Trace  / Bili: Negative / Urobili: Negative   Blood: x / Protein: 15 / Nitrite: Negative   Leuk Esterase: Negative / RBC: 0-1 /HPF / WBC 3-5 /HPF   Sq Epi: x / Non Sq Epi: Neg.-Few / Bacteria: x        Culture - Urine (collected 25 Mar 2023 14:25)  Source: Clean Catch Clean Catch (Midstream)  Final Report (26 Mar 2023 17:35):    <10,000 CFU/mL Normal Urogenital Nancy    Culture - Blood (collected 25 Mar 2023 14:25)  Source: .Blood Blood-Peripheral  Preliminary Report (26 Mar 2023 22:02):    No growth to date.    Culture - Blood (collected 25 Mar 2023 14:25)  Source: .Blood Blood-Peripheral  Preliminary Report (26 Mar 2023 22:02):    No growth to date.        RADIOLOGY & ADDITIONAL TESTS:    Care Discussed with Consultants/Other Providers:

## 2023-03-27 NOTE — PROGRESS NOTE ADULT - NS ATTEND AMEND GEN_ALL_CORE FT
Patient to return to senior care when symptoms resolved. Anticipate 48hrs Patient to return to [facility] when symptoms resolved. Anticipate 48hrs [facility] to return to [facility] when symptoms resolved. Anticipate 48hrs

## 2023-03-28 LAB
ANION GAP SERPL CALC-SCNC: 4 MMOL/L — LOW (ref 5–17)
BUN SERPL-MCNC: 20 MG/DL — SIGNIFICANT CHANGE UP (ref 7–23)
CALCIUM SERPL-MCNC: 8.7 MG/DL — SIGNIFICANT CHANGE UP (ref 8.4–10.5)
CHLORIDE SERPL-SCNC: 104 MMOL/L — SIGNIFICANT CHANGE UP (ref 96–108)
CO2 SERPL-SCNC: 30 MMOL/L — SIGNIFICANT CHANGE UP (ref 22–31)
CREAT SERPL-MCNC: 0.63 MG/DL — SIGNIFICANT CHANGE UP (ref 0.5–1.3)
EGFR: 86 ML/MIN/1.73M2 — SIGNIFICANT CHANGE UP
GLUCOSE SERPL-MCNC: 93 MG/DL — SIGNIFICANT CHANGE UP (ref 70–99)
HCT VFR BLD CALC: 35.9 % — SIGNIFICANT CHANGE UP (ref 34.5–45)
HGB BLD-MCNC: 11.8 G/DL — SIGNIFICANT CHANGE UP (ref 11.5–15.5)
MCHC RBC-ENTMCNC: 30.6 PG — SIGNIFICANT CHANGE UP (ref 27–34)
MCHC RBC-ENTMCNC: 32.9 GM/DL — SIGNIFICANT CHANGE UP (ref 32–36)
MCV RBC AUTO: 93.2 FL — SIGNIFICANT CHANGE UP (ref 80–100)
NRBC # BLD: 0 /100 WBCS — SIGNIFICANT CHANGE UP (ref 0–0)
PLATELET # BLD AUTO: 164 K/UL — SIGNIFICANT CHANGE UP (ref 150–400)
POTASSIUM SERPL-MCNC: 4.5 MMOL/L — SIGNIFICANT CHANGE UP (ref 3.5–5.3)
POTASSIUM SERPL-SCNC: 4.5 MMOL/L — SIGNIFICANT CHANGE UP (ref 3.5–5.3)
RBC # BLD: 3.85 M/UL — SIGNIFICANT CHANGE UP (ref 3.8–5.2)
RBC # FLD: 13.6 % — SIGNIFICANT CHANGE UP (ref 10.3–14.5)
SODIUM SERPL-SCNC: 138 MMOL/L — SIGNIFICANT CHANGE UP (ref 135–145)
WBC # BLD: 5.18 K/UL — SIGNIFICANT CHANGE UP (ref 3.8–10.5)
WBC # FLD AUTO: 5.18 K/UL — SIGNIFICANT CHANGE UP (ref 3.8–10.5)

## 2023-03-28 PROCEDURE — 99232 SBSQ HOSP IP/OBS MODERATE 35: CPT

## 2023-03-28 RX ADMIN — TRAMADOL HYDROCHLORIDE 50 MILLIGRAM(S): 50 TABLET ORAL at 06:37

## 2023-03-28 RX ADMIN — TRAMADOL HYDROCHLORIDE 50 MILLIGRAM(S): 50 TABLET ORAL at 21:31

## 2023-03-28 RX ADMIN — TRAMADOL HYDROCHLORIDE 50 MILLIGRAM(S): 50 TABLET ORAL at 06:07

## 2023-03-28 RX ADMIN — Medication 1200 MILLIGRAM(S): at 18:25

## 2023-03-28 RX ADMIN — Medication 1200 MILLIGRAM(S): at 06:07

## 2023-03-28 RX ADMIN — TRAMADOL HYDROCHLORIDE 50 MILLIGRAM(S): 50 TABLET ORAL at 14:51

## 2023-03-28 RX ADMIN — Medication 650 MILLIGRAM(S): at 15:36

## 2023-03-28 RX ADMIN — APIXABAN 2.5 MILLIGRAM(S): 2.5 TABLET, FILM COATED ORAL at 06:07

## 2023-03-28 RX ADMIN — Medication 650 MILLIGRAM(S): at 16:06

## 2023-03-28 RX ADMIN — APIXABAN 2.5 MILLIGRAM(S): 2.5 TABLET, FILM COATED ORAL at 18:25

## 2023-03-28 NOTE — PROGRESS NOTE ADULT - ASSESSMENT
87 yr old female with dementia ( AO x 1),CHF, AFIB, T12 compression fx, mitral valve replacement presents from assistant living due to coughing and congestion. From [facility]    Fever , cough, congestion- likely due to viral PNA  with electrolyte imbalance- hyponatremia (resolved)  CXR with PNA, RVP positive HMPV  Monitor off antibiotics  Supportive Care  Mucinex 1200 mg BID  Blood and urine cultures negative to date  Stable respiratory status off oxygen requirements  Supportive Care, Aspiration Precautions, Fall Precautions    HTN -  c/w lisinopril and Toprol   monitor BP'S    CHF  c/w Lasix  Pro-Brain Natriuretic Peptide: 1546 pg/mL (03.25.23 @ 14:25)  per daughter Pro BNP 2 months back- 2400's.  no fluid over-load noted  Continue to monitor fluid status    Thoracic compression fracture  diagnosed at [facility] 6-8 weeks back, no h/o falls  c/w tramadol tid.  per daughter she needs tramadol as standing dose, unable to ask due to dementia, per daughter tramadol standing help with mobility of the patient.     Chronic Afib -   c/w Eliquis 2.5 bid  Rate controlled    GOC  prior MOLST in chart- DNI. DNR.      vte ppx- on Eliquis    3/28 Updated daughter Silvia Hayes pending assisted living acceptance for return 87 yr old female with dementia ( AO x 1),CHF, AFIB, T12 compression fx, mitral valve replacement presents from assistant living due to coughing and congestion. From [facility]    Fever , cough, congestion- likely due to viral PNA  with electrolyte imbalance- hyponatremia (resolved)  CXR with PNA, RVP positive HMPV  Monitor off antibiotics  Supportive Care  Mucinex 1200 mg BID  Blood and urine cultures negative to date  Stable respiratory status off oxygen requirements  Supportive Care, Aspiration Precautions, Fall Precautions    HTN -  c/w lisinopril and Toprol   monitor BP'S    CHF  c/w Lasix  Pro-Brain Natriuretic Peptide: 1546 pg/mL (03.25.23 @ 14:25)  per daughter Pro BNP 2 months back- 2400's.  no fluid over-load noted  Continue to monitor fluid status    Thoracic compression fracture  diagnosed at [facility] 6-8 weeks back, no h/o falls  c/w tramadol tid.  per daughter she needs tramadol as standing dose, unable to ask due to dementia, per daughter tramadol standing help with mobility of the patient.     Chronic Afib -   c/w Eliquis 2.5 bid  Rate controlled    GOC  prior MOLST in chart- DNI. DNR.      vte ppx- on Eliquis    3/28 Updated daughter Silvia Hayes pending assisted living acceptance for return 87 yr old female with dementia ( AO x 1),CHF, AFIB, T12 compression fx, mitral valve replacement presents from assistant living due to coughing and congestion. From [facility]    Fever , cough, congestion- likely due to viral PNA  with electrolyte imbalance- hyponatremia (resolved)  CXR with PNA, RVP positive HMPV  Monitor off antibiotics  Supportive Care  Mucinex 1200 mg BID  Blood and urine cultures negative to date  Stable respiratory status off oxygen requirements  Supportive Care, Aspiration Precautions, Fall Precautions    HTN -  c/w lisinopril and Toprol   monitor BP'S    CHF  c/w Lasix  Pro-Brain Natriuretic Peptide: 1546 pg/mL (03.25.23 @ 14:25)  per daughter Pro BNP 2 months back- 2400's.  no fluid over-load noted  Continue to monitor fluid status    Thoracic compression fracture  diagnosed at [facility] 6-8 weeks back, no h/o falls  c/w tramadol tid.  per daughter she needs tramadol as standing dose, unable to ask due to dementia, per daughter tramadol standing help with mobility of the patient.     Chronic Afib -   c/w Eliquis 2.5 bid  Rate controlled    GOC  prior MOLST in chart- DNI. DNR.      vte ppx- on Eliquis    3/28 Updated daughter Silvia Hayes pending assisted living acceptance for return 87 yr old female with dementia ( AO x 1),CHF, AFIB, T12 compression fx, mitral valve replacement presents from assistant living due to coughing and congestion. From [facility]    Fever , cough, congestion- likely due to viral PNA  with electrolyte imbalance- hyponatremia (resolved)  CXR with PNA, RVP positive HMPV  Monitor off antibiotics  Supportive Care  Mucinex 1200 mg BID  Blood and urine cultures negative to date  Stable respiratory status off oxygen requirements  Supportive Care, Aspiration Precautions, Fall Precautions  Incentive spirometer    HTN -  c/w lisinopril and Toprol   monitor BP'S    CHF  c/w Lasix  Pro-Brain Natriuretic Peptide: 1546 pg/mL (03.25.23 @ 14:25)  per daughter Pro BNP 2 months back- 2400's.  no fluid over-load noted  Continue to monitor fluid status    Thoracic compression fracture  diagnosed at [facility] 6-8 weeks back, no h/o falls  c/w tramadol tid.  per daughter she needs tramadol as standing dose, unable to ask due to dementia, per daughter tramadol standing help with mobility of the patient.     Chronic Afib -   c/w Eliquis 2.5 bid  Rate controlled    GOC  prior MOLST in chart- DNI. DNR.      vte ppx- on Eliquis    3/28 Updated daughter Silvia   Dispo pending assisted living acceptance for return 87 yr old female with dementia ( AO x 1),CHF, AFIB, T12 compression fx, mitral valve replacement presents from assistant living due to coughing and congestion. From [facility]    Fever , cough, congestion- likely due to viral PNA  with electrolyte imbalance- hyponatremia (resolved)  CXR with PNA, RVP positive HMPV  Monitor off antibiotics  Supportive Care  Mucinex 1200 mg BID  Blood and urine cultures negative to date  Stable respiratory status off oxygen requirements  Supportive Care, Aspiration Precautions, Fall Precautions  Incentive spirometer    HTN -  c/w lisinopril and Toprol   monitor BP'S    CHF  c/w Lasix  Pro-Brain Natriuretic Peptide: 1546 pg/mL (03.25.23 @ 14:25)  per daughter Pro BNP 2 months back- 2400's.  no fluid over-load noted  Continue to monitor fluid status    Thoracic compression fracture  diagnosed at [facility] 6-8 weeks back, no h/o falls  c/w tramadol tid.  per daughter she needs tramadol as standing dose, unable to ask due to dementia, per daughter tramadol standing help with mobility of the patient.     Chronic Afib -   c/w Eliquis 2.5 bid  Rate controlled    GOC  prior MOLST in chart- DNI. DNR.      vte ppx- on Eliquis    3/28 Updated daughter Silvia   Dispo pending assisted living acceptance for return 87 yr old female with dementia ( AO x 1),CHF, AFIB, T12 compression fx, mitral valve replacement presents from assistant living due to coughing and congestion. From [facility]    Fever , cough, congestion- likely due to viral PNA  with electrolyte imbalance- hyponatremia (resolved)  CXR with PNA, RVP positive HMPV  Monitor off antibiotics  Supportive Care  Mucinex 1200 mg BID  Blood and urine cultures negative to date  Stable respiratory status off oxygen requirements  Supportive Care, Aspiration Precautions, Fall Precautions  Incentive spirometer    HTN -  c/w lisinopril and Toprol   monitor BP'S    CHF  c/w Lasix  Pro-Brain Natriuretic Peptide: 1546 pg/mL (03.25.23 @ 14:25)  per daughter Pro BNP 2 months back- 2400's.  no fluid over-load noted  Continue to monitor fluid status    Thoracic compression fracture  diagnosed at [facility] 6-8 weeks back, no h/o falls  c/w tramadol tid.  per daughter she needs tramadol as standing dose, unable to ask due to dementia, per daughter tramadol standing help with mobility of the patient.     Chronic Afib -   c/w Eliquis 2.5 bid  Rate controlled    GOC  prior MOLST in chart- DNI. DNR.      vte ppx- on Eliquis    3/28 Updated daughter Silvia   Dispo pending assisted living acceptance for return

## 2023-03-28 NOTE — PROGRESS NOTE ADULT - SUBJECTIVE AND OBJECTIVE BOX
Patient is a 87y old  Female who presents with a chief complaint of cough congestion (27 Mar 2023 07:19)    Patient seen and examined at bedside.  no acute events overnight    ALLERGIES:  No Known Allergies        Vital Signs Last 24 Hrs  T(F): 98.6 (28 Mar 2023 05:49), Max: 99.1 (27 Mar 2023 19:49)  HR: 86 (28 Mar 2023 05:49) (86 - 110)  BP: 121/71 (28 Mar 2023 05:49) (119/88 - 125/72)  RR: 18 (28 Mar 2023 05:49) (17 - 19)  SpO2: 94% (28 Mar 2023 05:49) (94% - 98%)  I&O's Summary    MEDICATIONS:  acetaminophen     Tablet .. 650 milliGRAM(s) Oral every 6 hours PRN  aluminum hydroxide/magnesium hydroxide/simethicone Suspension 30 milliLiter(s) Oral every 4 hours PRN  apixaban 2.5 milliGRAM(s) Oral two times a day  guaiFENesin ER 1200 milliGRAM(s) Oral every 12 hours  melatonin 3 milliGRAM(s) Oral at bedtime PRN  ondansetron Injectable 4 milliGRAM(s) IV Push every 8 hours PRN  traMADol 50 milliGRAM(s) Oral three times a day      PHYSICAL EXAM:  General: NAD, A/O x 1-2  ENT: MMM, no thrush  Neck: Supple, No JVD  Lungs: Course BS, non labored, bilateral air entry  Cardio: RRR, S1/S2, No murmurs  Abdomen: Soft, Nontender, Nondistended; Bowel sounds present  Extremities: No cyanosis, No edema    LABS:                        11.8   5.18  )-----------( 164      ( 28 Mar 2023 06:00 )             35.9     03-28    138  |  104  |  20  ----------------------------<  93  4.5   |  30  |  0.63    Ca    8.7      28 Mar 2023 06:00    TPro  6.3  /  Alb  2.9  /  TBili  0.5  /  DBili  x   /  AST  21  /  ALT  14  /  AlkPhos  74  03-26      PT/INR - ( 25 Mar 2023 14:25 )   PT: 15.9 sec;   INR: 1.37 ratio         PTT - ( 25 Mar 2023 14:25 )  PTT:35.6 sec  Lactate, Blood: 0.8 mmol/L ( @ 14:25)                          Urinalysis Basic - ( 25 Mar 2023 14:25 )    Color: Yellow / Appearance: Clear / S.015 / pH: x  Gluc: x / Ketone: Trace  / Bili: Negative / Urobili: Negative   Blood: x / Protein: 15 / Nitrite: Negative   Leuk Esterase: Negative / RBC: 0-1 /HPF / WBC 3-5 /HPF   Sq Epi: x / Non Sq Epi: Neg.-Few / Bacteria: x        Culture - Urine (collected 25 Mar 2023 14:25)  Source: Clean Catch Clean Catch (Midstream)  Final Report (26 Mar 2023 17:35):    <10,000 CFU/mL Normal Urogenital Nancy    Culture - Blood (collected 25 Mar 2023 14:25)  Source: .Blood Blood-Peripheral  Preliminary Report (26 Mar 2023 22:02):    No growth to date.    Culture - Blood (collected 25 Mar 2023 14:25)  Source: .Blood Blood-Peripheral  Preliminary Report (26 Mar 2023 22:02):    No growth to date.          RADIOLOGY & ADDITIONAL TESTS:    Care Discussed with Consultants/Other Providers:

## 2023-03-29 ENCOUNTER — TRANSCRIPTION ENCOUNTER (OUTPATIENT)
Age: 88
End: 2023-03-29

## 2023-03-29 LAB
ANION GAP SERPL CALC-SCNC: 7 MMOL/L — SIGNIFICANT CHANGE UP (ref 5–17)
BUN SERPL-MCNC: 23 MG/DL — SIGNIFICANT CHANGE UP (ref 7–23)
CALCIUM SERPL-MCNC: 8.9 MG/DL — SIGNIFICANT CHANGE UP (ref 8.4–10.5)
CHLORIDE SERPL-SCNC: 105 MMOL/L — SIGNIFICANT CHANGE UP (ref 96–108)
CO2 SERPL-SCNC: 28 MMOL/L — SIGNIFICANT CHANGE UP (ref 22–31)
CREAT SERPL-MCNC: 0.67 MG/DL — SIGNIFICANT CHANGE UP (ref 0.5–1.3)
EGFR: 85 ML/MIN/1.73M2 — SIGNIFICANT CHANGE UP
GLUCOSE SERPL-MCNC: 95 MG/DL — SIGNIFICANT CHANGE UP (ref 70–99)
HCT VFR BLD CALC: 35.8 % — SIGNIFICANT CHANGE UP (ref 34.5–45)
HGB BLD-MCNC: 11.8 G/DL — SIGNIFICANT CHANGE UP (ref 11.5–15.5)
MCHC RBC-ENTMCNC: 30.3 PG — SIGNIFICANT CHANGE UP (ref 27–34)
MCHC RBC-ENTMCNC: 33 GM/DL — SIGNIFICANT CHANGE UP (ref 32–36)
MCV RBC AUTO: 92 FL — SIGNIFICANT CHANGE UP (ref 80–100)
NRBC # BLD: 0 /100 WBCS — SIGNIFICANT CHANGE UP (ref 0–0)
PLATELET # BLD AUTO: 198 K/UL — SIGNIFICANT CHANGE UP (ref 150–400)
POTASSIUM SERPL-MCNC: 3.7 MMOL/L — SIGNIFICANT CHANGE UP (ref 3.5–5.3)
POTASSIUM SERPL-SCNC: 3.7 MMOL/L — SIGNIFICANT CHANGE UP (ref 3.5–5.3)
RBC # BLD: 3.89 M/UL — SIGNIFICANT CHANGE UP (ref 3.8–5.2)
RBC # FLD: 13.5 % — SIGNIFICANT CHANGE UP (ref 10.3–14.5)
SARS-COV-2 RNA SPEC QL NAA+PROBE: SIGNIFICANT CHANGE UP
SODIUM SERPL-SCNC: 140 MMOL/L — SIGNIFICANT CHANGE UP (ref 135–145)
WBC # BLD: 5.17 K/UL — SIGNIFICANT CHANGE UP (ref 3.8–10.5)
WBC # FLD AUTO: 5.17 K/UL — SIGNIFICANT CHANGE UP (ref 3.8–10.5)

## 2023-03-29 PROCEDURE — 99232 SBSQ HOSP IP/OBS MODERATE 35: CPT

## 2023-03-29 RX ADMIN — TRAMADOL HYDROCHLORIDE 50 MILLIGRAM(S): 50 TABLET ORAL at 05:30

## 2023-03-29 RX ADMIN — Medication 1200 MILLIGRAM(S): at 17:26

## 2023-03-29 RX ADMIN — APIXABAN 2.5 MILLIGRAM(S): 2.5 TABLET, FILM COATED ORAL at 17:26

## 2023-03-29 RX ADMIN — TRAMADOL HYDROCHLORIDE 50 MILLIGRAM(S): 50 TABLET ORAL at 13:19

## 2023-03-29 RX ADMIN — TRAMADOL HYDROCHLORIDE 50 MILLIGRAM(S): 50 TABLET ORAL at 14:00

## 2023-03-29 RX ADMIN — APIXABAN 2.5 MILLIGRAM(S): 2.5 TABLET, FILM COATED ORAL at 05:30

## 2023-03-29 RX ADMIN — TRAMADOL HYDROCHLORIDE 50 MILLIGRAM(S): 50 TABLET ORAL at 05:32

## 2023-03-29 RX ADMIN — Medication 1200 MILLIGRAM(S): at 05:59

## 2023-03-29 NOTE — DISCHARGE NOTE PROVIDER - NSDCMRMEDTOKEN_GEN_ALL_CORE_FT
cyanocobalamin 1000 mcg oral tablet: 1 tab(s) orally once a day  Eliquis 2.5 mg oral tablet: 1 tab(s) orally 2 times a day  Lasix 20 mg oral tablet: 1 tab(s) orally once a day  lisinopril 2.5 mg oral tablet: 1 orally once a day  Multiple Vitamins oral tablet: 1 tab(s) orally once a day  Potassium Chloride (Eqv-Klor-Con 10) 10 mEq oral tablet, extended release: 1 tab(s) orally once a day  Toprol-XL 25 mg oral tablet, extended release: 1 tab(s) orally once a day  traMADol 50 mg oral tablet: 1 tab(s) orally 3 times a day 1 tab 3x a day  Tylenol 325 mg oral tablet: 2 tab(s) orally every 8 hours as needed for  mild pain   benzonatate 100 mg oral capsule: 1 cap(s) orally 3 times a day As needed Cough  cyanocobalamin 1000 mcg oral tablet: 1 tab(s) orally once a day  dextromethorphan-guaifenesin 10 mg-100 mg/5 mL oral liquid: 10 milliliter(s) orally every 8 hours  Eliquis 2.5 mg oral tablet: 1 tab(s) orally 2 times a day  Lasix 20 mg oral tablet: 1 tab(s) orally once a day  Multiple Vitamins oral tablet: 1 tab(s) orally once a day  Toprol-XL 25 mg oral tablet, extended release: 1 tab(s) orally once a day  traMADol 50 mg oral tablet: 1 tab(s) orally 3 times a day 1 tab 3x a day  Tylenol 325 mg oral tablet: 2 tab(s) orally every 8 hours as needed for  mild pain

## 2023-03-29 NOTE — DISCHARGE NOTE PROVIDER - NSDCCPCAREPLAN_GEN_ALL_CORE_FT
PRINCIPAL DISCHARGE DIAGNOSIS  Diagnosis: Human metapneumovirus (hMPV) pneumonia  Assessment and Plan of Treatment: You presented with a respiratory virus. You were given supportive care and clinically improved.  STable for discharge back to assisted living

## 2023-03-29 NOTE — DISCHARGE NOTE PROVIDER - HOSPITAL COURSE
Hospital Course  87y Female with PMH of dementia, CHF, Afib, T12 compression fracture, mitral valve replacement presented from assisted living ([facility]) with coughing and congestion.  Imaging c/w pneumonia, RVP found to be positive for human metapneumovirus.  Antibiotics were stopped, supportive care, antitussives, chest physiotherapy.  Contact and Droplet precautions maintained.  Pt respiratory status remained stable off oxygen requirements.  Pt clinically improved, cultures remained negative.  Medically cleared to return back to assisted living.     Source of Infection:  Antibiotic / Last Day: Na due to viral illness    Palliative Care / Advanced Care Planning  Code Status: Full code  Patient/Family agreeable to Hospice/Palliative (Y/N)?  Summary of Goals of Care Conversation:    Discharging Provider:  Luis A Titus NP  Contact Info: Cell [phone] - Please call with any questions or concerns.    Outpatient Provider:            Hospital Course  87y Female with PMH of dementia, CHF, Afib, T12 compression fracture, mitral valve replacement presented from assisted living ([facility] [place]) with coughing and congestion.  Imaging c/w pneumonia, RVP found to be positive for human metapneumovirus.  Antibiotics were stopped, supportive care, antitussives, chest physiotherapy.  Contact and Droplet precautions maintained.  Pt respiratory status remained stable off oxygen requirements.  Pt clinically improved, cultures remained negative.  Medically cleared to return back to assisted living.     Source of Infection:  Antibiotic / Last Day: Na due to viral illness    Palliative Care / Advanced Care Planning  Code Status: Full code  Patient/Family agreeable to Hospice/Palliative (Y/N)?  Summary of Goals of Care Conversation:    Discharging Provider:  Luis A Titus NP  Contact Info: Cell [phone] - Please call with any questions or concerns.    Outpatient Provider:            Hospital Course  87y Female with PMH of dementia, CHF, Afib, T12 compression fracture, mitral valve replacement presented from assisted living ([facility]) with coughing and congestion.  Imaging c/w pneumonia, RVP found to be positive for human metapneumovirus.  Antibiotics were stopped, supportive care, antitussives, chest physiotherapy.  Contact and Droplet precautions maintained.  Pt respiratory status remained stable off oxygen requirements.  Pt clinically improved, cultures remained negative.  Medically cleared to return back to assisted living.     Source of Infection:  Antibiotic / Last Day: Na due to viral illness    Palliative Care / Advanced Care Planning  Code Status: Full code  Patient/Family agreeable to Hospice/Palliative (Y/N)?  Summary of Goals of Care Conversation:    Discharging Provider:  Luis A Titus NP  Contact Info: Cell [phone] - Please call with any questions or concerns.    Outpatient Provider:            Hospital Course  87y Female with PMH of dementia, CHF, Afib, T12 compression fracture, mitral valve replacement presented from assisted living ([facility]) with coughing and congestion.  Imaging c/w pneumonia, RVP found to be positive for human metapneumovirus.  Antibiotics were stopped, supportive care, antitussives, chest physiotherapy.  Contact and Droplet precautions maintained.  Pt respiratory status remained stable off oxygen requirements.  Pt clinically improved, cultures remained negative.  Medically cleared to return back to assisted living.     Source of Infection:  Antibiotic / Last Day: Na due to viral illness    Palliative Care / Advanced Care Planning  Code Status: DNR  Patient/Family agreeable to Hospice/Palliative (Y/N)?  Summary of Goals of Care Conversation:    Discharging Provider:  Deana JJ   Contact Info: Cell [phone]- Please call with any questions or concerns.           Hospital Course  87y Female with PMH of dementia, CHF, Afib, T12 compression fracture, mitral valve replacement presented from assisted living ([facility] [place]) with coughing and congestion.  Imaging c/w pneumonia, RVP found to be positive for human metapneumovirus.  Antibiotics were stopped, supportive care, antitussives, chest physiotherapy.  Contact and Droplet precautions maintained.  Pt respiratory status remained stable off oxygen requirements.  Pt clinically improved, cultures remained negative.  Medically cleared to return back to assisted living.     Source of Infection:  Antibiotic / Last Day: Na due to viral illness    Palliative Care / Advanced Care Planning  Code Status: DNR  Patient/Family agreeable to Hospice/Palliative (Y/N)?  Summary of Goals of Care Conversation:    Discharging Provider:  Deana JJ   Contact Info: Cell [phone]- Please call with any questions or concerns.           Hospital Course  87y Female with PMH of dementia, CHF, Afib, T12 compression fracture, mitral valve replacement presented from assisted living ([facility]) with coughing and congestion.  Imaging c/w pneumonia, RVP found to be positive for human metapneumovirus.  Antibiotics were stopped, supportive care, antitussives, chest physiotherapy.  Contact and Droplet precautions maintained.  Pt respiratory status remained stable off oxygen requirements.  Pt clinically improved, cultures remained negative.  Medically cleared to return back to assisted living.     Source of Infection:  Antibiotic / Last Day: Na due to viral illness    Palliative Care / Advanced Care Planning  Code Status: DNR  Patient/Family agreeable to Hospice/Palliative (Y/N)?  Summary of Goals of Care Conversation:    Discharging Provider:  Deana JJ   Contact Info: Cell [phone]- Please call with any questions or concerns.

## 2023-03-29 NOTE — PROGRESS NOTE ADULT - SUBJECTIVE AND OBJECTIVE BOX
Patient is a 87y old  Female who presents with a chief complaint of cough congestion (28 Mar 2023 07:52)    Patient seen and examined at bedside.  no acute overnight events    ALLERGIES:  No Known Allergies        Vital Signs Last 24 Hrs  T(F): 97.7 (29 Mar 2023 05:23), Max: 98.8 (28 Mar 2023 20:40)  HR: 87 (29 Mar 2023 05:23) (87 - 112)  BP: 129/65 (29 Mar 2023 05:23) (127/64 - 129/65)  RR: 17 (29 Mar 2023 05:23) (16 - 18)  SpO2: 95% (29 Mar 2023 05:23) (95% - 96%)  I&O's Summary    MEDICATIONS:  acetaminophen     Tablet .. 650 milliGRAM(s) Oral every 6 hours PRN  aluminum hydroxide/magnesium hydroxide/simethicone Suspension 30 milliLiter(s) Oral every 4 hours PRN  apixaban 2.5 milliGRAM(s) Oral two times a day  guaiFENesin ER 1200 milliGRAM(s) Oral every 12 hours  melatonin 3 milliGRAM(s) Oral at bedtime PRN  ondansetron Injectable 4 milliGRAM(s) IV Push every 8 hours PRN  traMADol 50 milliGRAM(s) Oral three times a day      PHYSICAL EXAM:  General: NAD, A/O x 1-2  ENT: MMM, no thrush  Neck: Supple, No JVD  Lungs: Clear to auscultation bilaterally, non labored, bilateral air entry  Cardio: RRR, S1/S2, No murmurs  Abdomen: Soft, Nontender, Nondistended; Bowel sounds present  Extremities: No cyanosis, No edema    LABS:                        11.8   5.17  )-----------( 198      ( 29 Mar 2023 05:55 )             35.8     03-29    140  |  105  |  23  ----------------------------<  95  3.7   |  28  |  0.67    Ca    8.9      29 Mar 2023 05:55                                      Culture - Urine (collected 25 Mar 2023 14:25)  Source: Clean Catch Clean Catch (Midstream)  Final Report (26 Mar 2023 17:35):    <10,000 CFU/mL Normal Urogenital Nancy    Culture - Blood (collected 25 Mar 2023 14:25)  Source: .Blood Blood-Peripheral  Preliminary Report (26 Mar 2023 22:02):    No growth to date.    Culture - Blood (collected 25 Mar 2023 14:25)  Source: .Blood Blood-Peripheral  Preliminary Report (26 Mar 2023 22:02):    No growth to date.          RADIOLOGY & ADDITIONAL TESTS:    Care Discussed with Consultants/Other Providers:

## 2023-03-29 NOTE — PROGRESS NOTE ADULT - ASSESSMENT
87 yr old female with dementia ( AO x 1),CHF, AFIB, T12 compression fx, mitral valve replacement presents from assistant living due to coughing and congestion. From [facility]    Fever , cough, congestion- likely due to viral PNA  with electrolyte imbalance- hyponatremia (resolved)  CXR with PNA, RVP positive HMPV  Monitor off antibiotics  Supportive Care, Chest physiotherapy  Mucinex 1200 mg BID  Blood and urine cultures negative to date  Stable respiratory status off oxygen requirements  Supportive Care, Aspiration Precautions, Fall Precautions  Incentive spirometer    HTN -  c/w lisinopril and Toprol   monitor BP'S    CHF  c/w Lasix  Pro-Brain Natriuretic Peptide: 1546 pg/mL (03.25.23 @ 14:25)  per daughter Pro BNP 2 months back- 2400's.  no fluid over-load noted  Continue to monitor fluid status    Thoracic compression fracture  diagnosed at [facility] 6-8 weeks back, no h/o falls  c/w tramadol tid.  per daughter she needs tramadol as standing dose, unable to ask due to dementia, per daughter tramadol standing help with mobility of the patient.     Chronic Afib -   c/w Eliquis 2.5 bid  Rate controlled    GOC  prior [facility] in chart- DNI. DNR.      vte ppx- on Eliquis    3/29 Updated daughter Silvia   Dispo pending assisted living acceptance for return, possible dc Thursday 3/29 87 yr old female with dementia ( AO x 1),CHF, AFIB, T12 compression fx, mitral valve replacement presents from assistant living due to coughing and congestion. From [facility]    Fever , cough, congestion- likely due to viral PNA  with electrolyte imbalance- hyponatremia (resolved)  CXR with PNA, RVP positive HMPV  Monitor off antibiotics  Supportive Care, Chest physiotherapy  Mucinex 1200 mg BID  Blood and urine cultures negative to date  Stable respiratory status off oxygen requirements  Supportive Care, Aspiration Precautions, Fall Precautions  Incentive spirometer    HTN -  c/w lisinopril and Toprol   monitor BP'S    CHF  c/w Lasix  Pro-Brain Natriuretic Peptide: 1546 pg/mL (03.25.23 @ 14:25)  per daughter Pro BNP 2 months back- 2400's.  no fluid over-load noted  Continue to monitor fluid status    Thoracic compression fracture  diagnosed at [facility] 6-8 weeks back, no h/o falls  c/w tramadol tid.  per daughter she needs tramadol as standing dose, unable to ask due to dementia, per daughter tramadol standing help with mobility of the patient.     Chronic Afib -   c/w Eliquis 2.5 bid  Rate controlled    GOC  prior [facility]OL[facility] in chart- DNI. DNR.      vte ppx- on Eliquis    3/29 Updated daughter Silvia   Dispo pending assisted living acceptance for return, possible dc Thursday 3/29 87 yr old female with dementia ( AO x 1),CHF, AFIB, T12 compression fx, mitral valve replacement presents from assistant living due to coughing and congestion. From [facility]    Fever , cough, congestion- likely due to viral PNA  with electrolyte imbalance- hyponatremia (resolved)  CXR with PNA, RVP positive HMPV  Monitor off antibiotics  Supportive Care, Chest physiotherapy  Mucinex 1200 mg BID  Blood and urine cultures negative to date  Stable respiratory status off oxygen requirements  Supportive Care, Aspiration Precautions, Fall Precautions  Incentive spirometer    HTN -  c/w lisinopril and Toprol   monitor BP'S    CHF  c/w Lasix  Pro-Brain Natriuretic Peptide: 1546 pg/mL (03.25.23 @ 14:25)  per daughter Pro BNP 2 months back- 2400's.  no fluid over-load noted  Continue to monitor fluid status    Thoracic compression fracture  diagnosed at [facility] 6-8 weeks back, no h/o falls  c/w tramadol tid.  per daughter she needs tramadol as standing dose, unable to ask due to dementia, per daughter tramadol standing help with mobility of the patient.     Chronic Afib -   c/w Eliquis 2.5 bid  Rate controlled    GOC  prior [facility] in chart- DNI. DNR.      vte ppx- on Eliquis    3/29 Updated daughter Silvia   Dispo pending assisted living acceptance for return, possible dc Thursday 3/29 87 yr old female with dementia ( AO x 1),CHF, AFIB, T12 compression fx, mitral valve replacement presents from assistant living due to coughing and congestion. From [facility]    Fever , cough, congestion- likely due to viral PNA  with electrolyte imbalance- hyponatremia (resolved)  CXR with PNA, RVP positive HMPV  Monitor off antibiotics  Supportive Care, Chest physiotherapy  Mucinex 1200 mg BID  Blood and urine cultures negative to date  Stable respiratory status off oxygen requirements  Supportive Care, Aspiration Precautions, Fall Precautions  Incentive spirometer    HTN -  c/w lisinopril and Toprol   monitor BP'S    CHF  c/w Lasix  Pro-Brain Natriuretic Peptide: 1546 pg/mL (03.25.23 @ 14:25)  per daughter Pro BNP 2 months back- 2400's.  no fluid over-load noted  Continue to monitor fluid status    Thoracic compression fracture  diagnosed at [facility] 6-8 weeks back, no h/o falls  c/w tramadol tid.  per daughter she needs tramadol as standing dose, unable to ask due to dementia, per daughter tramadol standing help with mobility of the patient.     Chronic Afib -   c/w Eliquis 2.5 bid  Rate controlled    GOC  prior [facility] in chart- DNI. DNR.      vte ppx- on Eliquis    3/29 Updated daughter Silvia   Dispo pending assisted living acceptance for return, possible dc Thursday 3/30 87 yr old female with dementia ( AO x 1),CHF, AFIB, T12 compression fx, mitral valve replacement presents from assistant living due to coughing and congestion. From [facility]    Fever , cough, congestion- likely due to viral PNA  with electrolyte imbalance- hyponatremia (resolved)  CXR with PNA, RVP positive HMPV  Monitor off antibiotics  Supportive Care, Chest physiotherapy  Mucinex 1200 mg BID  Blood and urine cultures negative to date  Stable respiratory status off oxygen requirements  Supportive Care, Aspiration Precautions, Fall Precautions  Incentive spirometer    HTN -  c/w lisinopril and Toprol   monitor BP'S    CHF  c/w Lasix  Pro-Brain Natriuretic Peptide: 1546 pg/mL (03.25.23 @ 14:25)  per daughter Pro BNP 2 months back- 2400's.  no fluid over-load noted  Continue to monitor fluid status    Thoracic compression fracture  diagnosed at [facility] 6-8 weeks back, no h/o falls  c/w tramadol tid.  per daughter she needs tramadol as standing dose, unable to ask due to dementia, per daughter tramadol standing help with mobility of the patient.     Chronic Afib -   c/w Eliquis 2.5 bid  Rate controlled    GOC  prior [facility] in chart- DNI. DNR.      vte ppx- on Eliquis    3/29 Updated daughter Silvia   Dispo pending assisted living acceptance for return, possible dc Thursday 3/30 87 yr old female with dementia ( AO x 1),CHF, AFIB, T12 compression fx, mitral valve replacement presents from assistant living due to coughing and congestion. From [facility]    Fever , cough, congestion- likely due to viral PNA  with electrolyte imbalance- hyponatremia (resolved)  CXR with PNA, RVP positive HMPV  Monitor off antibiotics  Supportive Care, Chest physiotherapy  Mucinex 1200 mg BID  Blood and urine cultures negative to date  Stable respiratory status off oxygen requirements  Supportive Care, Aspiration Precautions, Fall Precautions  Incentive spirometer    HTN -  c/w lisinopril and Toprol   monitor BP'S    CHF  c/w Lasix  Pro-Brain Natriuretic Peptide: 1546 pg/mL (03.25.23 @ 14:25)  per daughter Pro BNP 2 months back- 2400's.  no fluid over-load noted  Continue to monitor fluid status    Thoracic compression fracture  diagnosed at [facility] 6-8 weeks back, no h/o falls  c/w tramadol tid.  per daughter she needs tramadol as standing dose, unable to ask due to dementia, per daughter tramadol standing help with mobility of the patient.     Chronic Afib -   c/w Eliquis 2.5 bid  Rate controlled    GOC  prior [facility] in chart- DNI. DNR.      vte ppx- on Eliquis    3/29 Updated daughter Silvia   Dispo pending assisted living acceptance for return, possible dc Thursday 3/30

## 2023-03-29 NOTE — PROGRESS NOTE ADULT - NS ATTEND AMEND GEN_ALL_CORE FT
Cough is significantly less. Possible d/c tomorrow back to facility. Have to follow up wit facility and reasonable isolation time

## 2023-03-29 NOTE — DISCHARGE NOTE PROVIDER - DETAILS OF MALNUTRITION DIAGNOSIS/DIAGNOSES
This patient has been assessed with a concern for Malnutrition and was treated during this hospitalization for the following Nutrition diagnosis/diagnoses:     -  03/30/2023: Moderate protein-calorie malnutrition

## 2023-03-30 LAB
CULTURE RESULTS: SIGNIFICANT CHANGE UP
SPECIMEN SOURCE: SIGNIFICANT CHANGE UP

## 2023-03-30 PROCEDURE — 99233 SBSQ HOSP IP/OBS HIGH 50: CPT

## 2023-03-30 RX ORDER — GUAIFENESIN/DEXTROMETHORPHAN 600MG-30MG
10 TABLET, EXTENDED RELEASE 12 HR ORAL EVERY 8 HOURS
Refills: 0 | Status: DISCONTINUED | OUTPATIENT
Start: 2023-03-30 | End: 2023-03-31

## 2023-03-30 RX ORDER — FUROSEMIDE 40 MG
20 TABLET ORAL DAILY
Refills: 0 | Status: DISCONTINUED | OUTPATIENT
Start: 2023-03-30 | End: 2023-03-31

## 2023-03-30 RX ADMIN — Medication 10 MILLILITER(S): at 22:10

## 2023-03-30 RX ADMIN — Medication 1200 MILLIGRAM(S): at 06:01

## 2023-03-30 RX ADMIN — Medication 10 MILLILITER(S): at 16:06

## 2023-03-30 RX ADMIN — TRAMADOL HYDROCHLORIDE 50 MILLIGRAM(S): 50 TABLET ORAL at 16:05

## 2023-03-30 RX ADMIN — APIXABAN 2.5 MILLIGRAM(S): 2.5 TABLET, FILM COATED ORAL at 06:01

## 2023-03-30 RX ADMIN — Medication 20 MILLIGRAM(S): at 16:11

## 2023-03-30 RX ADMIN — APIXABAN 2.5 MILLIGRAM(S): 2.5 TABLET, FILM COATED ORAL at 18:07

## 2023-03-30 RX ADMIN — TRAMADOL HYDROCHLORIDE 50 MILLIGRAM(S): 50 TABLET ORAL at 06:02

## 2023-03-30 RX ADMIN — TRAMADOL HYDROCHLORIDE 50 MILLIGRAM(S): 50 TABLET ORAL at 06:32

## 2023-03-30 RX ADMIN — TRAMADOL HYDROCHLORIDE 50 MILLIGRAM(S): 50 TABLET ORAL at 17:01

## 2023-03-30 NOTE — PROGRESS NOTE ADULT - SUBJECTIVE AND OBJECTIVE BOX
Patient is a 87y old  Female who presents with a chief complaint of cough congestion (29 Mar 2023 14:21)    Afebrile overnight  Cough slightly improving   Patient seen and examined at bedside.    ALLERGIES:  No Known Allergies    MEDICATIONS  (STANDING):  apixaban 2.5 milliGRAM(s) Oral two times a day  guaiFENesin ER 1200 milliGRAM(s) Oral every 12 hours  traMADol 50 milliGRAM(s) Oral three times a day    MEDICATIONS  (PRN):  acetaminophen     Tablet .. 650 milliGRAM(s) Oral every 6 hours PRN Temp greater or equal to 38C (100.4F), Mild Pain (1 - 3)  aluminum hydroxide/magnesium hydroxide/simethicone Suspension 30 milliLiter(s) Oral every 4 hours PRN Dyspepsia  melatonin 3 milliGRAM(s) Oral at bedtime PRN Insomnia  ondansetron Injectable 4 milliGRAM(s) IV Push every 8 hours PRN Nausea and/or Vomiting    Vital Signs Last 24 Hrs  T(F): 97.2 (30 Mar 2023 05:39), Max: 98.6 (29 Mar 2023 15:03)  HR: 99 (30 Mar 2023 05:39) (76 - 101)  BP: 114/77 (30 Mar 2023 05:39) (114/77 - 126/94)  RR: 19 (30 Mar 2023 05:39) (16 - 19)  SpO2: 95% (30 Mar 2023 05:39) (95% - 97%)  I&O's Summary    29 Mar 2023 07:01  -  30 Mar 2023 07:00  --------------------------------------------------------  IN: 480 mL / OUT: 0 mL / NET: 480 mL        PHYSICAL EXAM:  General: NAD, A/O x 2, elderly   ENT: MMM, no thrush  Neck: Supple, No JVD  Lungs: Non labored breathing,  Clear to auscultation bilaterally,   Cardio: RRR, S1/S2, no pitting edema bilaterally  Abdomen: Soft, Nontender, Nondistended; Bowel sounds present  Extremities: No calf tenderness, moves all extremities    LABS:                        11.8   5.17  )-----------( 198      ( 29 Mar 2023 05:55 )             35.8       03-29    140  |  105  |  23  ----------------------------<  95  3.7   |  28  |  0.67    Ca    8.9      29 Mar 2023 05:55                                        Culture - Urine (collected 25 Mar 2023 14:25)  Source: Clean Catch Clean Catch (Midstream)  Final Report (26 Mar 2023 17:35):    <10,000 CFU/mL Normal Urogenital Nancy    Culture - Blood (collected 25 Mar 2023 14:25)  Source: .Blood Blood-Peripheral  Preliminary Report (26 Mar 2023 22:02):    No growth to date.    Culture - Blood (collected 25 Mar 2023 14:25)  Source: .Blood Blood-Peripheral  Preliminary Report (26 Mar 2023 22:02):    No growth to date.      COVID-19 PCR: Ricik (03-29-23 @ 17:30)      RADIOLOGY & ADDITIONAL TESTS:    Care Discussed with Consultants/Other Providers:    Patient is a 87y old  Female who presents with a chief complaint of cough congestion (29 Mar 2023 14:21)    Afebrile overnight  Cough slightly improving   Patient seen and examined at bedside.    ALLERGIES:  No Known Allergies    MEDICATIONS  (STANDING):  apixaban 2.5 milliGRAM(s) Oral two times a day  guaiFENesin ER 1200 milliGRAM(s) Oral every 12 hours  traMADol 50 milliGRAM(s) Oral three times a day    MEDICATIONS  (PRN):  acetaminophen     Tablet .. 650 milliGRAM(s) Oral every 6 hours PRN Temp greater or equal to 38C (100.4F), Mild Pain (1 - 3)  aluminum hydroxide/magnesium hydroxide/simethicone Suspension 30 milliLiter(s) Oral every 4 hours PRN Dyspepsia  melatonin 3 milliGRAM(s) Oral at bedtime PRN Insomnia  ondansetron Injectable 4 milliGRAM(s) IV Push every 8 hours PRN Nausea and/or Vomiting    Vital Signs Last 24 Hrs  T(F): 97.2 (30 Mar 2023 05:39), Max: 98.6 (29 Mar 2023 15:03)  HR: 99 (30 Mar 2023 05:39) (76 - 101)  BP: 114/77 (30 Mar 2023 05:39) (114/77 - 126/94)  RR: 19 (30 Mar 2023 05:39) (16 - 19)  SpO2: 95% (30 Mar 2023 05:39) (95% - 97%)  I&O's Summary    29 Mar 2023 07:01  -  30 Mar 2023 07:00  --------------------------------------------------------  IN: 480 mL / OUT: 0 mL / NET: 480 mL        PHYSICAL EXAM:  General: NAD, A/O x 2, elderly   ENT: MMM, no thrush  Neck: Supple, No JVD  Lungs: Non labored breathing,  Clear to auscultation bilaterally,   Cardio: RRR, S1/S2, no pitting edema bilaterally  Abdomen: Soft, Nontender, Nondistended; Bowel sounds present  Extremities: No calf tenderness, moves all extremities    LABS:                        11.8   5.17  )-----------( 198      ( 29 Mar 2023 05:55 )             35.8       03-29    140  |  105  |  23  ----------------------------<  95  3.7   |  28  |  0.67    Ca    8.9      29 Mar 2023 05:55                                        Culture - Urine (collected 25 Mar 2023 14:25)  Source: Clean Catch Clean Catch (Midstream)  Final Report (26 Mar 2023 17:35):    <10,000 CFU/mL Normal Urogenital Nancy    Culture - Blood (collected 25 Mar 2023 14:25)  Source: .Blood Blood-Peripheral  Preliminary Report (26 Mar 2023 22:02):    No growth to date.    Culture - Blood (collected 25 Mar 2023 14:25)  Source: .Blood Blood-Peripheral  Preliminary Report (26 Mar 2023 22:02):    No growth to date.      COVID-19 PCR: Ricki (03-29-23 @ 17:30)      RADIOLOGY & ADDITIONAL TESTS:    Care Discussed with Consultants/Other Providers:

## 2023-03-30 NOTE — DIETITIAN INITIAL EVALUATION ADULT - ORAL INTAKE PTA/DIET HISTORY
Pt admitted from assisted living facility. Unable to obtain hx from pt due to dysphagia. Interview conducted with pt's daughter via telephone, who reports pt with variable appetite, decreased over the past year or so. Pt is capable of eating well, but intake is inconsistent. NKFA. No chewing/swallowing issues. No feeding tube per MOLST.

## 2023-03-30 NOTE — DIETITIAN INITIAL EVALUATION ADULT - ADD RECOMMEND
1. Change diet to low Na  2. Add Ensure High Protein qd  3. Obtain and honor food preferences as able   4. No feeding tube per MOLST

## 2023-03-30 NOTE — PROGRESS NOTE ADULT - ASSESSMENT
87 yr old female with dementia ( AO x 1),CHF, AFIB, T12 compression fx, mitral valve replacement presents from assistant living due to coughing and congestion. From [facility]    Fever , cough, congestion- likely due to HMPV  CXR with PNA, RVP positive HMPV  Monitor off antibiotics  Supportive Care, Chest physiotherapy  Mucinex 1200 mg BID  Blood and urine cultures negative to date  Stable respiratory status off oxygen requirements  Supportive Care, Aspiration Precautions, Fall Precautions  Incentive spirometer    HTN -  c/w lisinopril and Toprol   monitor BP'S    CHF  c/w Lasix  Pro-Brain Natriuretic Peptide: 1546 pg/mL  per daughter Pro BNP 2 months back- 2400's.  no fluid over-load noted  Continue to monitor fluid status    Thoracic compression fracture  diagnosed at [facility] 6-8 weeks back, no h/o falls  c/w tramadol tid.  per daughter she needs tramadol as standing dose, unable to ask due to dementia, per daughter tramadol standing help with mobility of the patient.     Chronic Afib -   c/w Eliquis 2.5 bid  Rate controlled    GOC  prior MOLST in chart- DNI. DNR.      vte ppx- on Eliquis    3/30 Updated daughter Silvia   Dispo pending assisted living acceptance for return, possible dc Thursday 3/30. Medically stable. Covid PCR today negative  87 yr old female with dementia ( AO x 1),CHF, AFIB, T12 compression fx, mitral valve replacement presents from assistant living due to coughing and congestion. From [facility]    Fever , cough, congestion- likely due to HMPV  CXR with PNA, RVP positive HMPV  Monitor off antibiotics  Supportive Care, Chest physiotherapy  Mucinex 1200 mg BID  Blood and urine cultures negative to date  Stable respiratory status off oxygen requirements  Supportive Care, Aspiration Precautions, Fall Precautions  Incentive spirometer    HTN -  c/w lisinopril and Toprol   monitor BP'S    CHF  c/w Lasix  Pro-Brain Natriuretic Peptide: 1546 pg/mL  per daughter Pro BNP 2 months back- 2400's.  no fluid over-load noted  Continue to monitor fluid status    Thoracic compression fracture  diagnosed at [facility] 6-8 weeks back, no h/o falls  c/w tramadol tid.  per daughter she needs tramadol as standing dose, unable to ask due to dementia, per daughter tramadol standing help with mobility of the patient.     Chronic Afib -   c/w Eliquis 2.5 bid  Rate controlled    GOC  prior MOLST in chart- DNI. DNR.      vte ppx- on Eliquis    3/30 Updated daughter Silvia   Dispo pending assisted living acceptance for return, possible dc Thursday 3/30. Medically stable. Covid PCR today negative  87 yr old female with dementia ( AO x 1),CHF, AFIB, T12 compression fx, mitral valve replacement presents from assistant living due to coughing and congestion. From [facility]    Fever , cough, congestion- likely due to HMPV  CXR with PNA, RVP positive HMPV  Monitor off antibiotics  Supportive Care, Chest physiotherapy  Mucinex 1200 mg BID  Blood and urine cultures negative to date  Stable respiratory status off oxygen requirements  Supportive Care, Aspiration Precautions, Fall Precautions  Incentive spirometer    HTN -  c/w lisinopril and Toprol   monitor BP'S    CHF  c/w Lasix  Pro-Brain Natriuretic Peptide: 1546 pg/mL  per daughter Pro BNP 2 months back- 2400's.  no fluid over-load noted  Continue to monitor fluid status    Thoracic compression fracture  diagnosed at [facility] 6-8 weeks back, no h/o falls  c/w tramadol tid.  per daughter she needs tramadol as standing dose, unable to ask due to dementia, per daughter tramadol standing help with mobility of the patient.     Chronic Afib -   c/w Eliquis 2.5 bid  Rate controlled    GOC  prior MOLST in chart- DNI. DNR.      vte ppx- on Eliquis    3/30 Updated daughter Silvia   Dispo pending assisted living acceptance for return, possible dc Thursday 3/30. Medically stable. Covid PCR today negative  87 yr old female with dementia ( AO x 1),CHF, AFIB, T12 compression fx, mitral valve replacement presents from assistant living due to coughing and congestion. From [facility]    Fever , cough, congestion- likely due to HMPV  CXR with PNA, RVP positive    Monitor off antibiotics  Supportive Care, Chest physiotherapy  Mucinex 1200 mg BID  Blood and urine cultures negative to date  Stable respiratory status off oxygen requirements  Supportive Care, Aspiration Precautions, Fall Precautions  Incentive spirometer    HTN   c/w lisinopril and Toprol   monitor BP'S    CHF  c/w Lasix  Pro-Brain Natriuretic Peptide: 1546 pg/mL  per daughter Pro BNP 2 months back- 2400's.  no fluid over-load noted  Continue to monitor fluid status    Thoracic compression fracture  diagnosed at [facility] 6-8 weeks back, no h/o falls  c/w tramadol tid.  per daughter she needs tramadol as standing dose, unable to ask due to dementia, per daughter tramadol standing help with mobility of the patient.     Chronic Afib -   c/w Eliquis 2.5 bid  Rate controlled    GOC  prior MOLST in chart- DNI. DNR.      vte ppx- on Eliquis    3/30 Updated daughter Silvia   Dispo pending assisted living acceptance for return, possible dc Thursday 3/30. Medically stable. Covid PCR today negative  87 yr old female with dementia ( AO x 1),CHF, AFIB, T12 compression fx, mitral valve replacement presents from assistant living due to coughing and congestion. From [facility]    Fever , cough, congestion- likely due to HMPV  CXR with PNA, RVP positive    Monitor off antibiotics  Supportive Care, Chest physiotherapy  Mucinex 1200 mg BID  Blood and urine cultures negative to date  Stable respiratory status off oxygen requirements  Supportive Care, Aspiration Precautions, Fall Precautions  Incentive spirometer    HTN   c/w lisinopril and Toprol   monitor BP'S    CHF  c/w Lasix  Pro-Brain Natriuretic Peptide: 1546 pg/mL  per daughter Pro BNP 2 months back- 2400's.  no fluid over-load noted  Continue to monitor fluid status    Thoracic compression fracture  diagnosed at [facility] 6-8 weeks back, no h/o falls  c/w tramadol tid.  per daughter she needs tramadol as standing dose, unable to ask due to dementia, per daughter tramadol standing help with mobility of the patient.     Chronic Afib -   c/w Eliquis 2.5 bid  Rate controlled    GOC  prior MOLST in chart- DNI. DNR.      vte ppx- on Eliquis    3/30 Updated daughter Silvia   Dispo pending assisted living acceptance for return, possible dc Thursday 3/30. Medically stable. Covid PCR today negative  87 yr old female with dementia ( AO x 1),CHF, AFIB, T12 compression fx, mitral valve replacement presents from assistant living due to coughing and congestion. From [facility]    Fever , cough, congestion- likely due to HMPV  CXR with PNA, RVP positive    Monitor off antibiotics  Supportive Care, Chest physiotherapy  Mucinex 1200 mg BID  Blood and urine cultures negative to date  Stable respiratory status off oxygen requirements  Supportive Care, Aspiration Precautions, Fall Precautions  Incentive spirometer    HTN   c/w lisinopril and Toprol   monitor BP'S    CHF  c/w Lasix  Pro-Brain Natriuretic Peptide: 1546 pg/mL  per daughter Pro BNP 2 months back- 2400's.  no fluid over-load noted  Continue to monitor fluid status    Thoracic compression fracture  diagnosed at [facility] 6-8 weeks back, no h/o falls  c/w tramadol tid.  per daughter she needs tramadol as standing dose, unable to ask due to dementia, per daughter tramadol standing help with mobility of the patient.     Chronic Afib -   c/w Eliquis 2.5 bid  Rate controlled    GOC  prior MOLST in chart- DNI. DNR.      vte ppx- on Eliquis    3/30 Updated daughter Silvia   Dispo pending assisted living acceptance for return, possible dc Thursday 3/30. Medically stable. Covid PCR today negative

## 2023-03-30 NOTE — DIETITIAN INITIAL EVALUATION ADULT - NSICDXPASTMEDICALHX_GEN_ALL_CORE_FT
PAST MEDICAL HISTORY:  Benign essential HTN     Chronic atrial fibrillation     Dementia     MVP (mitral valve prolapse)     Spinal compression fracture

## 2023-03-30 NOTE — DIETITIAN INITIAL EVALUATION ADULT - PERTINENT MEDS FT
MEDICATIONS  (STANDING):  apixaban 2.5 milliGRAM(s) Oral two times a day  furosemide    Tablet 20 milliGRAM(s) Oral daily  guaifenesin/dextromethorphan Oral Liquid 10 milliLiter(s) Oral every 8 hours  traMADol 50 milliGRAM(s) Oral three times a day    MEDICATIONS  (PRN):  acetaminophen     Tablet .. 650 milliGRAM(s) Oral every 6 hours PRN Temp greater or equal to 38C (100.4F), Mild Pain (1 - 3)  aluminum hydroxide/magnesium hydroxide/simethicone Suspension 30 milliLiter(s) Oral every 4 hours PRN Dyspepsia  benzonatate 100 milliGRAM(s) Oral three times a day PRN Cough  melatonin 3 milliGRAM(s) Oral at bedtime PRN Insomnia  ondansetron Injectable 4 milliGRAM(s) IV Push every 8 hours PRN Nausea and/or Vomiting

## 2023-03-30 NOTE — PHYSICAL THERAPY INITIAL EVALUATION ADULT - PERTINENT HX OF CURRENT PROBLEM, REHAB EVAL
87 yr old female with dementia ( AO x 1),CHF, AFIB, T12 compression fx, mitral valve replacement presents from assistant living due to coughing and congestion. From [facility]    Fever , cough, congestion- likely due to HMPV 87 yr old female with dementia ( AO x 1),CHF, AFIB, T12 compression fx, mitral valve replacement presents from assistant living due to coughing and congestion. From [facility]    Fever , cough, congestion- likely due to HMPV 87 yr old female with dementia ( AO x 1),CHF, AFIB, T12 compression fx, mitral valve replacement presents from assistant living due to coughing and congestion. From [facility]    Fever , cough, congestion- likely due to HMPV

## 2023-03-30 NOTE — DIETITIAN INITIAL EVALUATION ADULT - OTHER INFO
87 yr old female with dementia ( AO x 1),CHF, AFIB, T12 compression fx, mitral valve replacement presents from assistant living due to coughing and congestion. From [facility].   Pt confused, po intake varies per nursing, %. Spoke with pt's daughter, UBW 135lbs ~1 year ago, current weight (3/25) 120.1lbs, +hx CHF on lasix. Receptive to adding Ensure High Protein qd. Will liberalize diet to low Na given advanced age. Preferences obtained from pt's daughter. Diet education inappropriate given pt's mental status, d/c planning to assisted living facility.  87 yr old female with dementia ( AO x 1),CHF, AFIB, T12 compression fx, mitral valve replacement presents from assistant living due to coughing and congestion. From [facility].   Pt confused, po intake varies per nursing, %. Spoke with pt's daughter, UBW 135lbs ~1 year ago, current weight (3/25) 120.1lbs, +hx CHF on lasix. Receptive to adding Ensure High Protein qd. Will liberalize diet to low Na given advanced age. Preferences obtained from pt's daughter. Diet education inappropriate given pt's mental status, d/c planning to assisted living facility.  87 yr old female with dementia ( AO x 1),CHF, AFIB, T12 compression fx, mitral valve replacement presents from assistant living due to coughing and congestion. From [facility].   Pt confused, po intake varies per nursing, %. Spoke with pt's daughter, UBW 135lbs ~1 year ago, current weight (3/25) 120.1lbs, +hx CHF on lasix. Receptive to adding Ensure High Protein qd. Will liberalize diet to low Na given advanced age. Preferences obtained from pt's daughter. Diet education inappropriate given pt's mental status, d/c planning to assisted living facility.

## 2023-03-30 NOTE — DIETITIAN INITIAL EVALUATION ADULT - PERTINENT LABORATORY DATA
03-29    140  |  105  |  23  ----------------------------<  95  3.7   |  28  |  0.67    Ca    8.9      29 Mar 2023 05:55

## 2023-03-30 NOTE — PROGRESS NOTE ADULT - NS ATTEND AMEND GEN_ALL_CORE FT
hMPV infection  - sating well on room air   - pt with persistent cough   - Mucinex 1200mg BID   - pt lives in an assisted living facility with a roommate, pending acceptance for return.

## 2023-03-31 ENCOUNTER — TRANSCRIPTION ENCOUNTER (OUTPATIENT)
Age: 88
End: 2023-03-31

## 2023-03-31 VITALS
TEMPERATURE: 98 F | HEART RATE: 76 BPM | OXYGEN SATURATION: 94 % | DIASTOLIC BLOOD PRESSURE: 72 MMHG | SYSTOLIC BLOOD PRESSURE: 127 MMHG | RESPIRATION RATE: 18 BRPM

## 2023-03-31 PROCEDURE — 99285 EMERGENCY DEPT VISIT HI MDM: CPT | Mod: 25

## 2023-03-31 PROCEDURE — 80048 BASIC METABOLIC PNL TOTAL CA: CPT

## 2023-03-31 PROCEDURE — 83880 ASSAY OF NATRIURETIC PEPTIDE: CPT

## 2023-03-31 PROCEDURE — 85730 THROMBOPLASTIN TIME PARTIAL: CPT

## 2023-03-31 PROCEDURE — 87040 BLOOD CULTURE FOR BACTERIA: CPT

## 2023-03-31 PROCEDURE — 81001 URINALYSIS AUTO W/SCOPE: CPT

## 2023-03-31 PROCEDURE — 87086 URINE CULTURE/COLONY COUNT: CPT

## 2023-03-31 PROCEDURE — 36415 COLL VENOUS BLD VENIPUNCTURE: CPT

## 2023-03-31 PROCEDURE — 71045 X-RAY EXAM CHEST 1 VIEW: CPT

## 2023-03-31 PROCEDURE — 87635 SARS-COV-2 COVID-19 AMP PRB: CPT

## 2023-03-31 PROCEDURE — 85610 PROTHROMBIN TIME: CPT

## 2023-03-31 PROCEDURE — 85027 COMPLETE CBC AUTOMATED: CPT

## 2023-03-31 PROCEDURE — 83605 ASSAY OF LACTIC ACID: CPT

## 2023-03-31 PROCEDURE — 85025 COMPLETE CBC W/AUTO DIFF WBC: CPT

## 2023-03-31 PROCEDURE — 0225U NFCT DS DNA&RNA 21 SARSCOV2: CPT

## 2023-03-31 PROCEDURE — 80053 COMPREHEN METABOLIC PANEL: CPT

## 2023-03-31 PROCEDURE — 93005 ELECTROCARDIOGRAM TRACING: CPT

## 2023-03-31 PROCEDURE — 97162 PT EVAL MOD COMPLEX 30 MIN: CPT

## 2023-03-31 PROCEDURE — 99239 HOSP IP/OBS DSCHRG MGMT >30: CPT

## 2023-03-31 RX ORDER — GUAIFENESIN/DEXTROMETHORPHAN 600MG-30MG
10 TABLET, EXTENDED RELEASE 12 HR ORAL
Qty: 0 | Refills: 0 | DISCHARGE
Start: 2023-03-31

## 2023-03-31 RX ADMIN — Medication 10 MILLILITER(S): at 06:23

## 2023-03-31 RX ADMIN — APIXABAN 2.5 MILLIGRAM(S): 2.5 TABLET, FILM COATED ORAL at 06:06

## 2023-03-31 RX ADMIN — TRAMADOL HYDROCHLORIDE 50 MILLIGRAM(S): 50 TABLET ORAL at 07:00

## 2023-03-31 RX ADMIN — TRAMADOL HYDROCHLORIDE 50 MILLIGRAM(S): 50 TABLET ORAL at 06:06

## 2023-03-31 RX ADMIN — Medication 3 MILLIGRAM(S): at 01:29

## 2023-03-31 RX ADMIN — Medication 20 MILLIGRAM(S): at 06:06

## 2023-03-31 NOTE — PROGRESS NOTE ADULT - REASON FOR ADMISSION
cough congestion

## 2023-03-31 NOTE — PROGRESS NOTE ADULT - NUTRITIONAL ASSESSMENT
This patient has been assessed with a concern for Malnutrition and has been determined to have a diagnosis/diagnoses of Moderate protein-calorie malnutrition.    This patient is being managed with:   Diet Regular-  Low Sodium  Supplement Feeding Modality:  Oral  Ensure Plus High Protein Cans or Servings Per Day:  1       Frequency:  Daily  Entered: Mar 30 2023  3:07PM    Diet Regular-  Low Sodium  Supplement Feeding Modality:  Oral  Ensure Plus High Protein Cans or Servings Per Day:  1       Frequency:  Daily  Entered: Mar 30 2023  3:04PM    The following pending diet order is being considered for treatment of Moderate protein-calorie malnutrition:null

## 2023-03-31 NOTE — DISCHARGE NOTE NURSING/CASE MANAGEMENT/SOCIAL WORK - NSDCPEFALRISK_GEN_ALL_CORE
For information on Fall & Injury Prevention, visit: [email or web address]/[email or web address]-[email or web address]-health-[email or web address]-mobility OR  [email or web address].[email or web address]/news/fall-prevention-tips-to-avoid-injury OR  https://www.cdc.gov/steadi/patient.html For information on Fall & Injury Prevention, visit: https://www.[facility]/news/fall-prevention-protects-and-maintains-health-and-mobility OR  https://www.[facility]/news/fall-prevention-tips-to-avoid-injury OR  https://www.cdc.gov/steadi/patient.html For information on Fall & Injury Prevention, visit: https://www.[facility]/news/fall-prevention-protects-and-maintains-health-and-mobility OR  https://www.[facility]/news/fall-prevention-tips-to-avoid-injury OR  https://www.cdc.gov/steadi/patient.html

## 2023-03-31 NOTE — PROGRESS NOTE ADULT - SUBJECTIVE AND OBJECTIVE BOX
Patient is a 87y old  Female who presents with a chief complaint of Human metapneumovirus (hMPV) pneumonia  Cough improved  No acute issues    (30 Mar 2023 14:46)      Patient seen and examined at bedside.    ALLERGIES:  No Known Allergies    MEDICATIONS  (STANDING):  apixaban 2.5 milliGRAM(s) Oral two times a day  furosemide    Tablet 20 milliGRAM(s) Oral daily  guaifenesin/dextromethorphan Oral Liquid 10 milliLiter(s) Oral every 8 hours  traMADol 50 milliGRAM(s) Oral three times a day    MEDICATIONS  (PRN):  acetaminophen     Tablet .. 650 milliGRAM(s) Oral every 6 hours PRN Temp greater or equal to 38C (100.4F), Mild Pain (1 - 3)  aluminum hydroxide/magnesium hydroxide/simethicone Suspension 30 milliLiter(s) Oral every 4 hours PRN Dyspepsia  benzonatate 100 milliGRAM(s) Oral three times a day PRN Cough  melatonin 3 milliGRAM(s) Oral at bedtime PRN Insomnia  ondansetron Injectable 4 milliGRAM(s) IV Push every 8 hours PRN Nausea and/or Vomiting    Vital Signs Last 24 Hrs  T(F): 98.6 (31 Mar 2023 06:16), Max: 98.6 (31 Mar 2023 06:16)  HR: 97 (31 Mar 2023 06:16) (85 - 97)  BP: 130/74 (31 Mar 2023 06:16) (121/78 - 131/69)  RR: 16 (31 Mar 2023 06:16) (16 - 18)  SpO2: 97% (31 Mar 2023 06:16) (93% - 97%)  I&O's Summary    30 Mar 2023 07:01  -  31 Mar 2023 07:00  --------------------------------------------------------  IN: 600 mL / OUT: 0 mL / NET: 600 mL        PHYSICAL EXAM:  General: NAD, A/O x 1, elderly , confused  ENT: MMM, no thrush  Neck: Supple, No JVD  Lungs: Non labored breathing,  Clear to auscultation bilaterally,   Cardio: RRR, S1/S2, no pitting edema bilaterally  Abdomen: Soft, Nontender, Nondistended; Bowel sounds present  Extremities: No calf tenderness, moves all extremities    LABS:                        11.8   5.17  )-----------( 198      ( 29 Mar 2023 05:55 )             35.8       03-29    140  |  105  |  23  ----------------------------<  95  3.7   |  28  |  0.67    Ca    8.9      29 Mar 2023 05:55                                        Culture - Urine (collected 25 Mar 2023 14:25)  Source: Clean Catch Clean Catch (Midstream)  Final Report (26 Mar 2023 17:35):    <10,000 CFU/mL Normal Urogenital Nancy    Culture - Blood (collected 25 Mar 2023 14:25)  Source: .Blood Blood-Peripheral  Final Report (30 Mar 2023 22:01):    No Growth Final    Culture - Blood (collected 25 Mar 2023 14:25)  Source: .Blood Blood-Peripheral  Final Report (30 Mar 2023 22:01):    No Growth Final      COVID-19 PCR: NotDetec (03-29-23 @ 17:30)      RADIOLOGY & ADDITIONAL TESTS:    Care Discussed with Consultants/Other Providers:

## 2023-03-31 NOTE — DISCHARGE NOTE NURSING/CASE MANAGEMENT/SOCIAL WORK - NSDCFUADDAPPT_GEN_ALL_CORE_FT
[facility] D[facility] D/c to [facility] with Dr. Cas Dinh following D/c to [facility] with Dr. Cas Dinh following D/c to [facility] with Dr. Cas Dinh following

## 2023-03-31 NOTE — DISCHARGE NOTE NURSING/CASE MANAGEMENT/SOCIAL WORK - PATIENT PORTAL LINK FT
You can access the FollowMyHealth Patient Portal offered by [facility] by registering at the following website: [email or web address]. By joining [employer]’s FollowMyHealth portal, you will also be able to view your health information using other applications (apps) compatible with our system. You can access the FollowMyHealth Patient Portal offered by [facility] by registering at the following website: [email or web address]. By joining [employer]’s FollowMyHealth portal, you will also be able to view your health information using other applications (apps) compatible with our system. You can access the FollowMyHealth Patient Portal offered by [facility] by registering at the following website: [email or web address]. By joining [employer]’s FollowMyHealth portal, you will also be able to view your health information using other applications (apps) compatible with our system.

## 2023-03-31 NOTE — PROGRESS NOTE ADULT - ASSESSMENT
87 yr old female with dementia ( AO x 1),CHF, AFIB, T12 compression fx, mitral valve replacement presents from assistant living due to coughing and congestion. From [facility]    Fever , cough, congestion- likely due to HMPV  CXR with PNA, RVP positive    Monitor off antibiotics  Supportive Care, Chest physiotherapy  Mucinex 1200 mg BID  Blood and urine cultures negative to date  Stable respiratory status off oxygen requirements  Supportive Care, Aspiration Precautions, Fall Precautions  Incentive spirometer    HTN   c/w lisinopril and Toprol   monitor BP'S    CHF  c/w Lasix  Pro-Brain Natriuretic Peptide: 1546 pg/mL  per daughter Pro BNP 2 months back- 2400's.  no fluid over-load noted  Continue to monitor fluid status    Thoracic compression fracture  diagnosed at [facility] 6-8 weeks back, no h/o falls  c/w tramadol tid.  per daughter she needs tramadol as standing dose, unable to ask due to dementia, per daughter tramadol standing help with mobility of the patient.     Chronic Afib -   c/w Eliquis 2.5 bid  Rate controlled    GOC  prior MOLST in chart- DNI. DNR.      vte ppx- on Eliquis    3/31 Updated daughter Silvia   Dispo DIscharge back to assisted living today.  Medically stable. Covid PCR negative 3/30 87 yr old female with dementia ( AO x 1),CHF, AFIB, T12 compression fx, mitral valve replacement presents from assistant living due to coughing and congestion. From [facility]    Fever , cough, congestion- likely due to HMPV  CXR with PNA, RVP positive    Monitor off antibiotics  Supportive Care, Chest physiotherapy  Mucinex 1200 mg BID  Blood and urine cultures negative to date  Stable respiratory status off oxygen requirements  Supportive Care, Aspiration Precautions, Fall Precautions  Incentive spirometer    HTN   c/w lisinopril and Toprol   monitor BP'S    CHF  c/w Lasix  Pro-Brain Natriuretic Peptide: 1546 pg/mL  per daughter Pro BNP 2 months back- 2400's.  no fluid over-load noted  Continue to monitor fluid status    Thoracic compression fracture  diagnosed at [facility] 6-8 weeks back, no h/o falls  c/w tramadol tid.  per daughter she needs tramadol as standing dose, unable to ask due to dementia, per daughter tramadol standing help with mobility of the patient.     Chronic Afib -   c/w Eliquis 2.5 bid  Rate controlled    GOC  prior MOLST in chart- DNI. DNR.      vte ppx- on Eliquis    3/31 Updated daughter Silvia   Dispo DIscharge back to assisted living today.  Medically stable. Covid PCR negative 3/30 87 yr old female with dementia ( AO x 1),CHF, AFIB, T12 compression fx, mitral valve replacement presents from assistant living due to coughing and congestion. From [facility]    Fever , cough, congestion- likely due to HMPV  CXR with PNA, RVP positive    Monitor off antibiotics  Supportive Care, Chest physiotherapy  Mucinex 1200 mg BID  Blood and urine cultures negative to date  Stable respiratory status off oxygen requirements  Supportive Care, Aspiration Precautions, Fall Precautions  Incentive spirometer    HTN   c/w lisinopril and Toprol   monitor BP'S    CHF  c/w Lasix  Pro-Brain Natriuretic Peptide: 1546 pg/mL  per daughter Pro BNP 2 months back- 2400's.  no fluid over-load noted  Continue to monitor fluid status    Thoracic compression fracture  diagnosed at [facility] 6-8 weeks back, no h/o falls  c/w tramadol tid.  per daughter she needs tramadol as standing dose, unable to ask due to dementia, per daughter tramadol standing help with mobility of the patient.     Chronic Afib -   c/w Eliquis 2.5 bid  Rate controlled    GOC  prior MOLST in chart- DNI. DNR.      vte ppx- on Eliquis    3/31 Updated daughter Silvia   Dispo DIscharge back to assisted living today.  Medically stable. Covid PCR negative 3/30

## 2023-03-31 NOTE — PROGRESS NOTE ADULT - NS ATTEND AMEND GEN_ALL_CORE FT
fever and cough due to hMPV infection   - fever resolved.   - cough much improved  - sating well on RA  - DC back to assisted living today    Chronic A fib  - Eliquis   - HR controlled

## 2023-07-08 ENCOUNTER — INPATIENT (INPATIENT)
Facility: HOSPITAL | Age: 88
LOS: 1 days | Discharge: HOME | DRG: 866 | End: 2023-07-10
Attending: STUDENT IN AN ORGANIZED HEALTH CARE EDUCATION/TRAINING PROGRAM | Admitting: INTERNAL MEDICINE
Payer: MEDICARE

## 2023-07-08 VITALS
DIASTOLIC BLOOD PRESSURE: 60 MMHG | RESPIRATION RATE: 17 BRPM | HEART RATE: 60 BPM | SYSTOLIC BLOOD PRESSURE: 124 MMHG | TEMPERATURE: 100 F | OXYGEN SATURATION: 95 % | WEIGHT: 158.07 LBS

## 2023-07-08 DIAGNOSIS — R05.9 COUGH, UNSPECIFIED: ICD-10-CM

## 2023-07-08 DIAGNOSIS — Z90.710 ACQUIRED ABSENCE OF BOTH CERVIX AND UTERUS: Chronic | ICD-10-CM

## 2023-07-08 LAB
ALBUMIN SERPL ELPH-MCNC: 3.3 G/DL — SIGNIFICANT CHANGE UP (ref 3.3–5)
ALP SERPL-CCNC: 81 U/L — SIGNIFICANT CHANGE UP (ref 40–120)
ALT FLD-CCNC: 12 U/L — SIGNIFICANT CHANGE UP (ref 10–45)
ANION GAP SERPL CALC-SCNC: 6 MMOL/L — SIGNIFICANT CHANGE UP (ref 5–17)
APTT BLD: 34.7 SEC — SIGNIFICANT CHANGE UP (ref 27.5–35.5)
AST SERPL-CCNC: 19 U/L — SIGNIFICANT CHANGE UP (ref 10–40)
BASOPHILS # BLD AUTO: 0.05 K/UL — SIGNIFICANT CHANGE UP (ref 0–0.2)
BASOPHILS NFR BLD AUTO: 0.4 % — SIGNIFICANT CHANGE UP (ref 0–2)
BILIRUB SERPL-MCNC: 0.9 MG/DL — SIGNIFICANT CHANGE UP (ref 0.2–1.2)
BUN SERPL-MCNC: 17 MG/DL — SIGNIFICANT CHANGE UP (ref 7–23)
CALCIUM SERPL-MCNC: 8.9 MG/DL — SIGNIFICANT CHANGE UP (ref 8.4–10.5)
CHLORIDE SERPL-SCNC: 97 MMOL/L — SIGNIFICANT CHANGE UP (ref 96–108)
CO2 SERPL-SCNC: 30 MMOL/L — SIGNIFICANT CHANGE UP (ref 22–31)
CREAT SERPL-MCNC: 0.78 MG/DL — SIGNIFICANT CHANGE UP (ref 0.5–1.3)
EGFR: 73 ML/MIN/1.73M2 — SIGNIFICANT CHANGE UP
EOSINOPHIL # BLD AUTO: 0.03 K/UL — SIGNIFICANT CHANGE UP (ref 0–0.5)
EOSINOPHIL NFR BLD AUTO: 0.3 % — SIGNIFICANT CHANGE UP (ref 0–6)
GLUCOSE SERPL-MCNC: 118 MG/DL — HIGH (ref 70–99)
HCT VFR BLD CALC: 33.6 % — LOW (ref 34.5–45)
HGB BLD-MCNC: 11 G/DL — LOW (ref 11.5–15.5)
IMM GRANULOCYTES NFR BLD AUTO: 0.5 % — SIGNIFICANT CHANGE UP (ref 0–0.9)
INR BLD: 1.57 RATIO — HIGH (ref 0.88–1.16)
LACTATE SERPL-SCNC: 0.6 MMOL/L — LOW (ref 0.7–2)
LYMPHOCYTES # BLD AUTO: 1.2 K/UL — SIGNIFICANT CHANGE UP (ref 1–3.3)
LYMPHOCYTES # BLD AUTO: 10.5 % — LOW (ref 13–44)
MCHC RBC-ENTMCNC: 29.6 PG — SIGNIFICANT CHANGE UP (ref 27–34)
MCHC RBC-ENTMCNC: 32.7 GM/DL — SIGNIFICANT CHANGE UP (ref 32–36)
MCV RBC AUTO: 90.6 FL — SIGNIFICANT CHANGE UP (ref 80–100)
MONOCYTES # BLD AUTO: 1.16 K/UL — HIGH (ref 0–0.9)
MONOCYTES NFR BLD AUTO: 10.1 % — SIGNIFICANT CHANGE UP (ref 2–14)
NEUTROPHILS # BLD AUTO: 8.95 K/UL — HIGH (ref 1.8–7.4)
NEUTROPHILS NFR BLD AUTO: 78.2 % — HIGH (ref 43–77)
NRBC # BLD: 0 /100 WBCS — SIGNIFICANT CHANGE UP (ref 0–0)
NT-PROBNP SERPL-SCNC: 1268 PG/ML — HIGH (ref 0–300)
PLATELET # BLD AUTO: 196 K/UL — SIGNIFICANT CHANGE UP (ref 150–400)
POTASSIUM SERPL-MCNC: 3.9 MMOL/L — SIGNIFICANT CHANGE UP (ref 3.5–5.3)
POTASSIUM SERPL-SCNC: 3.9 MMOL/L — SIGNIFICANT CHANGE UP (ref 3.5–5.3)
PROCALCITONIN SERPL-MCNC: 0.08 NG/ML — SIGNIFICANT CHANGE UP
PROT SERPL-MCNC: 7.3 G/DL — SIGNIFICANT CHANGE UP (ref 6–8.3)
PROTHROM AB SERPL-ACNC: 18.3 SEC — HIGH (ref 10.5–13.4)
RAPID RVP RESULT: DETECTED
RBC # BLD: 3.71 M/UL — LOW (ref 3.8–5.2)
RBC # FLD: 15.2 % — HIGH (ref 10.3–14.5)
RV+EV RNA SPEC QL NAA+PROBE: DETECTED
SARS-COV-2 RNA SPEC QL NAA+PROBE: SIGNIFICANT CHANGE UP
SODIUM SERPL-SCNC: 133 MMOL/L — LOW (ref 135–145)
WBC # BLD: 11.45 K/UL — HIGH (ref 3.8–10.5)
WBC # FLD AUTO: 11.45 K/UL — HIGH (ref 3.8–10.5)

## 2023-07-08 PROCEDURE — 99285 EMERGENCY DEPT VISIT HI MDM: CPT

## 2023-07-08 PROCEDURE — 99223 1ST HOSP IP/OBS HIGH 75: CPT

## 2023-07-08 PROCEDURE — 93010 ELECTROCARDIOGRAM REPORT: CPT

## 2023-07-08 PROCEDURE — 71045 X-RAY EXAM CHEST 1 VIEW: CPT | Mod: 26

## 2023-07-08 RX ORDER — OLANZAPINE 15 MG/1
2.5 TABLET, FILM COATED ORAL AT BEDTIME
Refills: 0 | Status: DISCONTINUED | OUTPATIENT
Start: 2023-07-08 | End: 2023-07-10

## 2023-07-08 RX ORDER — METOPROLOL TARTRATE 50 MG
25 TABLET ORAL DAILY
Refills: 0 | Status: DISCONTINUED | OUTPATIENT
Start: 2023-07-08 | End: 2023-07-10

## 2023-07-08 RX ORDER — APIXABAN 2.5 MG/1
2.5 TABLET, FILM COATED ORAL
Refills: 0 | Status: DISCONTINUED | OUTPATIENT
Start: 2023-07-08 | End: 2023-07-10

## 2023-07-08 RX ORDER — CEFTRIAXONE 500 MG/1
1000 INJECTION, POWDER, FOR SOLUTION INTRAMUSCULAR; INTRAVENOUS ONCE
Refills: 0 | Status: COMPLETED | OUTPATIENT
Start: 2023-07-08 | End: 2023-07-08

## 2023-07-08 RX ORDER — ACETAMINOPHEN 500 MG
650 TABLET ORAL EVERY 6 HOURS
Refills: 0 | Status: DISCONTINUED | OUTPATIENT
Start: 2023-07-08 | End: 2023-07-10

## 2023-07-08 RX ORDER — FUROSEMIDE 40 MG
20 TABLET ORAL DAILY
Refills: 0 | Status: DISCONTINUED | OUTPATIENT
Start: 2023-07-08 | End: 2023-07-10

## 2023-07-08 RX ORDER — ACETAMINOPHEN 500 MG
650 TABLET ORAL ONCE
Refills: 0 | Status: COMPLETED | OUTPATIENT
Start: 2023-07-08 | End: 2023-07-08

## 2023-07-08 RX ORDER — TRAMADOL HYDROCHLORIDE 50 MG/1
50 TABLET ORAL THREE TIMES A DAY
Refills: 0 | Status: DISCONTINUED | OUTPATIENT
Start: 2023-07-08 | End: 2023-07-09

## 2023-07-08 RX ORDER — ONDANSETRON 8 MG/1
4 TABLET, FILM COATED ORAL EVERY 8 HOURS
Refills: 0 | Status: DISCONTINUED | OUTPATIENT
Start: 2023-07-08 | End: 2023-07-10

## 2023-07-08 RX ORDER — GUAIFENESIN/DEXTROMETHORPHAN 600MG-30MG
10 TABLET, EXTENDED RELEASE 12 HR ORAL EVERY 8 HOURS
Refills: 0 | Status: DISCONTINUED | OUTPATIENT
Start: 2023-07-08 | End: 2023-07-10

## 2023-07-08 RX ORDER — ALBUTEROL 90 UG/1
2.5 AEROSOL, METERED ORAL EVERY 6 HOURS
Refills: 0 | Status: DISCONTINUED | OUTPATIENT
Start: 2023-07-08 | End: 2023-07-10

## 2023-07-08 RX ORDER — LANOLIN ALCOHOL/MO/W.PET/CERES
3 CREAM (GRAM) TOPICAL AT BEDTIME
Refills: 0 | Status: DISCONTINUED | OUTPATIENT
Start: 2023-07-08 | End: 2023-07-10

## 2023-07-08 RX ORDER — PREGABALIN 225 MG/1
1000 CAPSULE ORAL DAILY
Refills: 0 | Status: DISCONTINUED | OUTPATIENT
Start: 2023-07-08 | End: 2023-07-10

## 2023-07-08 RX ORDER — AZITHROMYCIN 500 MG/1
500 TABLET, FILM COATED ORAL ONCE
Refills: 0 | Status: COMPLETED | OUTPATIENT
Start: 2023-07-08 | End: 2023-07-08

## 2023-07-08 RX ADMIN — AZITHROMYCIN 255 MILLIGRAM(S): 500 TABLET, FILM COATED ORAL at 12:36

## 2023-07-08 RX ADMIN — Medication 10 MILLILITER(S): at 14:34

## 2023-07-08 RX ADMIN — AZITHROMYCIN 500 MILLIGRAM(S): 500 TABLET, FILM COATED ORAL at 13:36

## 2023-07-08 RX ADMIN — CEFTRIAXONE 1000 MILLIGRAM(S): 500 INJECTION, POWDER, FOR SOLUTION INTRAMUSCULAR; INTRAVENOUS at 13:06

## 2023-07-08 RX ADMIN — CEFTRIAXONE 100 MILLIGRAM(S): 500 INJECTION, POWDER, FOR SOLUTION INTRAMUSCULAR; INTRAVENOUS at 12:36

## 2023-07-08 RX ADMIN — Medication 650 MILLIGRAM(S): at 12:51

## 2023-07-08 RX ADMIN — Medication 10 MILLILITER(S): at 21:46

## 2023-07-08 RX ADMIN — Medication 3 MILLIGRAM(S): at 22:13

## 2023-07-08 RX ADMIN — Medication 650 MILLIGRAM(S): at 11:51

## 2023-07-08 RX ADMIN — OLANZAPINE 2.5 MILLIGRAM(S): 15 TABLET, FILM COATED ORAL at 22:13

## 2023-07-08 NOTE — ED ADULT NURSE NOTE - OBJECTIVE STATEMENT
Patient came by EMS from assisted living facility with complaint of fever and cough for the past few days. Patent denies any nausea, vomit, SOB or chest pain. PMH of dementia.

## 2023-07-08 NOTE — PHARMACOTHERAPY INTERVENTION NOTE - COMMENTS
Levofloxacin 500mg daily was ordered for this patient with suspected pneumonia.  Recommended dose for pneumonia is 750mg.  Dr. Mukherjee agreed to change dose to 750mg in accordance with pneumonia guidelines.  Please note that patient's creatinine clearance is currently 56 mL/min and renal dosage adjustment is not necessary for levofloxacin until it falls before 50 mL/min.

## 2023-07-08 NOTE — ED PROVIDER NOTE - CLINICAL SUMMARY MEDICAL DECISION MAKING FREE TEXT BOX
88 -year-old female with past medical history of A-fib on Eliquis CHF on Lasix, dementia was brought in by EMS from [facility] for fever and cough x yesterday. Pt denies chest pain, SOB, abd pain, nausea, vomiting, diarrhea all other complaints. PE as noted above. labs/UA/imaging pending, meds given, reassess. Patient afebrile rectally. IV fluids were held given patient's CHF history 88 -year-old female with past medical history of A-fib on Eliquis CHF on Lasix, dementia was brought in by EMS from [facility] for fever and cough x yesterday. Pt denies chest pain, SOB, abd pain, nausea, vomiting, diarrhea all other complaints. PE as noted above. labs/UA/imaging pending, meds given, reassess. Patient afebrile rectally. IV fluids were held given patient's CHF history 88 -year-old female with past medical history of A-fib on Eliquis CHF on Lasix, dementia was brought in by EMS from [facility] for fever and cough x yesterday. Pt denies chest pain, SOB, abd pain, nausea, vomiting, diarrhea all other complaints. PE as noted above. labs/UA/imaging pending, meds given, reassess. Patient afebrile rectally. IV fluids were held given patient's CHF history 88 -year-old female with past medical history of A-fib on Eliquis CHF on Lasix, dementia was brought in by EMS from [facility] for fever and cough x yesterday. Pt denies chest pain, SOB, abd pain, nausea, vomiting, diarrhea all other complaints. PE as noted above. labs/UA/imaging pending, meds given, reassess. Patient afebrile rectally. IV fluids were held given patient's CHF history.     1230pm: Labs reviewed, patient started on antibiotics, chest x-ray results reviewed.  Spoke with hospitalist Dr. Mukherjee will admit for hypoxia with cough and fever, likely clinical pneumonia 88 -year-old female with past medical history of A-fib on Eliquis CHF on Lasix, dementia was brought in by EMS from [facility] for fever and cough x yesterday. Pt denies chest pain, SOB, abd pain, nausea, vomiting, diarrhea all other complaints. PE as noted above. labs/UA/imaging pending, meds given, reassess. Patient afebrile rectally. IV fluids were held given patient's CHF history.     1230pm: Labs reviewed, patient started on antibiotics, chest x-ray results reviewed.  Spoke with hospitalist Dr. Mukherjee will admit for hypoxia with cough and fever, likely clinical pneumonia 88 -year-old female with past medical history of A-fib on Eliquis CHF on Lasix, dementia was brought in by EMS from [facility] for fever and cough x yesterday. Pt denies chest pain, SOB, abd pain, nausea, vomiting, diarrhea all other complaints. PE as noted above. labs/UA/imaging pending, meds given, reassess. Patient afebrile rectally. IV fluids were held given patient's CHF history.     1230pm: Labs reviewed, patient started on antibiotics, chest x-ray results reviewed.  Spoke with hospitalist Dr. Mukherjee will admit for hypoxia with cough and fever, likely clinical pneumonia

## 2023-07-08 NOTE — ED PROVIDER NOTE - ATTENDING APP SHARED VISIT CONTRIBUTION OF CARE
Dr. Montanez: I performed a face to face bedside interview with patient regarding history of present illness, review of symptoms and past medical history. I completed an independent physical exam.  I have discussed patient's plan of care with PA.   I agree with note as stated above, having amended the EMR as needed to reflect my findings.   This includes HISTORY OF PRESENT ILLNESS, HIV, PAST MEDICAL/SURGICAL/FAMILY/SOCIAL HISTORY, ALLERGIES AND HOME MEDICATIONS, REVIEW OF SYSTEMS, PHYSICAL EXAM, and any PROGRESS NOTES during the time I functioned as the attending physician for this patient.    80-year-old female with a history of CHF, A-fib presents emergency department for fever and cough.  Patient aaox1  at baseline unable to provide reliable history.  Spoke with daughter over the phone who is healthcare proxy who states that patient was having a cough and the facility sent her in for evaluation.  Symptoms have been ongoing for the past 1 to 2 days as per EMS bedside.    Vital signs reviewed  GENERAL: Patient nontoxic appearing, NAD  HEAD: NCAT  EYES: Anicteric  ENT: MMM  NECK: Supple, non tender  RESPIRATORY:   Coarse breath sounds bilaterally  CARDIOVASCULAR: Regular rate and rhythm  ABDOMEN: Soft. Nondistended. Nontender. No guarding or rebound. No CVA tenderness.  MUSCULOSKELETAL/EXTREMITIES: Brisk cap refill. 2+ radial pulses.  trace pedal edema bilaterally  SKIN:  Warm and dry  NEURO: AAOx1  PSYCHIATRIC: Cooperative. Affect appropriate.      plan for labs, x-ray, UA, blood cultures, empiric antibiotics, RVP.    Differentials include pneumonia, bronchitis, pleural effusion, worsening CHF, viral syndrome.    Patient noted to be hypoxic down to 90% on room air while at rest ranging only up to the 96%.  Patient likely will need admission to the hospital for further monitoring.    Given her trace pedal edema currently hemodynamically stable with a history of heart failure will not give fluids at this time.

## 2023-07-08 NOTE — ED PROVIDER NOTE - OBJECTIVE STATEMENT
88 -year-old female with past medical history of A-fib on Eliquis CHF on Lasix, dementia was brought in by EMS from [facility] for fever and cough x yesterday. Pt denies chest pain, SOB, abd pain, nausea, vomiting, diarrhea all other complaints. 88 -year-old female with past medical history of A-fib on Eliquis CHF on Lasix, dementia was brought in by EMS from [facility] for fever and cough x yesterday. Pt denies chest pain, SOB, abd pain, nausea, vomiting, diarrhea all other complaints. 88 -year-old female with past medical history of A-fib on Eliquis CHF on Lasix, dementia was brought in by EMS from [facility] for fever and cough x yesterday. Pt denies chest pain, SOB, abd pain, nausea, vomiting, diarrhea all other complaints.

## 2023-07-08 NOTE — PATIENT PROFILE ADULT - MEDICATIONS/VISITS
results found for: WBC, RBC, HGB, HCT, MCV, MCH, MCHC, MPV, NEUTOPHILPCT, LYMPHOPCT, MONOPCT, EOSRELPCT, BASOPCT, NEUTROABS, LYMPHSABS, MONOSABS, EOSABS  No results found for: NA, K, CL, CO2, ANIONGAP, GLUCOSE, BUN, CREATININE, LABGLOM, GFRAA, CALCIUM, PROT, LABALBU, AGRATIO, BILITOT, ALKPHOS, ALT, AST, GLOB  No results found for: CHOL, TRIG, HDL, LDLCALC, LABVLDL  No results found for: TSHREFLEX  No results found for: IRON, TIBC, LABIRON  No results found for: GKHYWRVO17, FOLATE  No results found for: VITD25  No results found for: LABA1C, EAG    Review of Systems   Constitutional: Negative. Negative for chills, fatigue and fever. HENT: Negative. Eyes: Negative. Respiratory: Negative. Negative for cough and shortness of breath. Cardiovascular: Negative. Gastrointestinal: Negative. Endocrine: Negative. Genitourinary: Negative. Musculoskeletal: Negative. Skin: Negative. Allergic/Immunologic: Negative. Neurological: Negative. Hematological: Negative. Psychiatric/Behavioral: Negative. Objective:     Physical Exam  Vitals reviewed. Constitutional:       Appearance: She is well-developed. HENT:      Head: Normocephalic and atraumatic. Eyes:      Conjunctiva/sclera: Conjunctivae normal.      Pupils: Pupils are equal, round, and reactive to light. Pulmonary:      Effort: Pulmonary effort is normal.   Abdominal:      Palpations: Abdomen is soft. Musculoskeletal:         General: Normal range of motion. Cervical back: Normal range of motion and neck supple. Skin:     General: Skin is warm and dry. Neurological:      Mental Status: She is alert and oriented to person, place, and time. Psychiatric:         Behavior: Behavior normal.         Thought Content: Thought content normal.         Judgment: Judgment normal.       Assessment and Plan:   Patient is here for their preoperative education group visit for sleeve gastrectomy. The patient is up 3.6 lbs today.  The patient's current Body mass index is 45.29 kg/m². (3/22/22). She is making good dietary and behavior modifications and is considered to be a good surgical candidate. Patient has a diagnosis of chronic GERD and takes a PPI. Discussed the benefits of weight loss and dietary changes on acid reflux. However, they will most likely need to continue their medication short term after surgery as they adjust to the new diet. Discussed the fact that they will be required to crush or open their medications for the first two weeks after surgery and reviewed those medications that can not be crushed. Patient received instruction that it is recommended to avoid pregnancy following bariatric surgery for at least 2 years to allow them to have stable weight loss and to help avoid increased risk of vitamin deficiencies and malnutrition. The patient was encouraged to discuss possible contraceptive methods with their PCP or OBGYN. Patient received instructions from the registered dietitian in reference to the two week preoperative diet and the four phases of their postoperative diet. In addition I reviewed these instructions and stressed the importance of following these recommendations for their safety. Patient completed the preoperative class where they were provided with education related to their bariatric surgery, common surgical complications, medications preoperatively & postoperatively, special concerns related to bariatric surgery postoperatively, vitamin supplementation, patient agreement, PAT & scheduling, hospital course, wellness discovery program and what to do in the case of an emergency postoperatively. The dietitians reviewed all preoperative and postoperative diet instructions. Patient was given the opportunity to ask questions during the group visit and these questions were answered by myself and/or the dietitian.  I spent a total of 45 minutes on the day of the visit and over half of that time was spent counseling the patient on proper dietary behaviors, exercise and surgery protocols. no

## 2023-07-08 NOTE — PATIENT PROFILE ADULT - NS PRO AD PATIENT TYPE
Health Care Proxy (HCP)/Do Not Resuscitate (DNR)/Medical Orders for Life-Sustaining Treatment (MOLST)/Living Will/Power of

## 2023-07-08 NOTE — H&P ADULT - ASSESSMENT
88 -year-old female with past medical history of A-fib on Eliquis, CHF on Lasix, dementia was brought in by EMS from [facility] assisted living for fever since yesterday and cough for a few days.      #Fever and cough- suspect PNA  - T max 100 and SpO2 lowest 91% in ED  - cont with rocephin and   - CXR negative  - check procalcitonin  - Rocephin and Azithromycin for 5 days total  - Robitussin q8  - check sputum, blood cx.   - check Legionella and strep pneumo ag     #CHF  - pro-bnp 1268, at pt's baseline  - appears euvolemic clinically   - cont with PO lasix 20mg  -Toprol  - check Echo    #HTN  -cont with Toprol 25mg    #A fib   -cont Toprol and Eliquis     #DVT ppx: on Eliquis       88 -year-old female with past medical history of A-fib on Eliquis, CHF on Lasix, dementia was brought in by EMS from [facility] assisted living for fever since yesterday and cough for a few days.      #Fever and cough- suspect PNA  - T max 100 and SpO2 lowest 91% in ED  - cont with rocephin and   - CXR negative  - check procalcitonin  - Rocephin and Azithromycin for 5 days total  - Robitussin q8  - check sputum, blood cx.   - check Legionella and strep pneumo ag     #CHF  - pro-bnp 1268, at pt's baseline  - appears euvolemic clinically   - cont with PO lasix 20mg  -Toprol  - check Echo    #HTN  -cont with Toprol 25mg    #A fib   -cont Toprol and Eliquis     #DVT ppx: on Eliquis       88 -year-old female with past medical history of A-fib on Eliquis, CHF on Lasix, dementia was brought in by EMS from [facility] assisted living for fever since yesterday and cough for a few days.      #Fever and cough- suspect PNA  - T max 100 and SpO2 lowest 91% in ED  - cont with rocephin and   - CXR negative  - check procalcitonin  - Rocephin and Azithromycin for 5 days total  - Robitussin q8  - check sputum, blood cx.   - check Legionella and strep pneumo ag     #CHF  - pro-bnp 1268, at pt's baseline  - appears euvolemic clinically   - cont with PO lasix 20mg  -Toprol  - check Echo    #HTN  -cont with Toprol 25mg    #A fib   -cont Toprol and Eliquis     #DVT ppx: on Eliquis       88 -year-old female with past medical history of A-fib on Eliquis, CHF on Lasix, dementia was brought in by EMS from [facility] assisted living for fever since yesterday and cough for a few days.      #Fever and cough- suspect PNA  - T max 100 and SpO2 lowest 91% in ED  - cont with Rocephin and Azithromycin   - CXR negative  - RVP pending   - check procalcitonin  - Robitussin q8  - check sputum, blood cx.   - check Legionella and strep pneumo ag     #CHF  - pro-bnp 1268, at pt's baseline  - appears euvolemic clinically   - cont with PO lasix 20mg  -Toprol  - check Echo    #HTN  -cont with Toprol 25mg    #A fib   -cont Toprol and Eliquis     #DVT ppx: on Eliquis    Code: DNR/DNI. confirmed with pt's daughter that pt is DNR DNI    updated pt's daughter, Silvia, on the phone.       Update:    RVP panel positive for enterovirus/ rhinovirus   - monitor off antibiotics since symptoms likely due to viral infection   - f/u procalcitonin          88 -year-old female with past medical history of A-fib on Eliquis, CHF on Lasix, dementia was brought in by EMS from [facility] assisted living for fever since yesterday and cough for a few days.      #Fever and cough- suspect PNA  - T max 100 and SpO2 lowest 91% in ED  - cont with Rocephin and Azithromycin   - CXR negative  - RVP pending   - check procalcitonin  - Robitussin q8  - check sputum, blood cx.   - check Legionella and strep pneumo ag     #CHF  - pro-bnp 1268, at pt's baseline  - appears euvolemic clinically   - cont with PO lasix 20mg  -Toprol  - check Echo    #HTN  -cont with Toprol 25mg    #A fib   -cont Toprol and Eliquis     #DVT ppx: on Eliquis    Code: DNR/DNI. confirmed with pt's daughter that pt is DNR DNI    updated pt's daughter, Silvia, on the phone.       Update:    RVP panel positive for enterovirus/ rhinovirus   - monitor off antibiotics since symptoms likely due to viral infection   - f/u procalcitonin          88 -year-old female with past medical history of A-fib on Eliquis, CHF on Lasix, dementia was brought in by EMS from [facility] assisted living for fever since yesterday and cough for a few days.      #Fever and cough- suspect PNA  - T max 100 and SpO2 lowest 91% in ED  - cont with Rocephin and Azithromycin   - CXR negative  - RVP pending   - check procalcitonin  - Robitussin q8  - check sputum, blood cx.   - check Legionella and strep pneumo ag     #CHF  - pro-bnp 1268, at pt's baseline  - appears euvolemic clinically   - cont with PO lasix 20mg  -Toprol  - check Echo    #HTN  -cont with Toprol 25mg    #A fib   -cont Toprol and Eliquis     #DVT ppx: on Eliquis    Code: DNR/DNI. confirmed with pt's daughter that pt is DNR DNI    updated pt's daughter, Silvia, on the phone.       Update:    RVP panel positive for enterovirus/ rhinovirus   - monitor off antibiotics since symptoms likely due to viral infection   - f/u procalcitonin          88 -year-old female with past medical history of A-fib on Eliquis, CHF on Lasix, dementia was brought in by EMS from [facility] assisted living for fever since yesterday and cough for a few days.      #Fever and cough- suspect PNA  - T max 100 and SpO2 lowest 91% in ED  - cont with Rocephin and Azithromycin   - CXR negative  - RVP pending   - check procalcitonin  - Robitussin q8  - check sputum, blood cx.   - check Legionella and strep pneumo ag     #CHF  - pro-bnp 1268, at pt's baseline  - appears euvolemic clinically   - cont with PO lasix 20mg  -Toprol  - check Echo    #HTN  -cont with Toprol 25mg    #A fib   -cont Toprol and Eliquis     #DVT ppx: on Eliquis    Code: DNR/DNI. confirmed with pt's daughter that pt is DNR DNI    updated pt's daughter, Silvia, on the phone.       Update:    RVP panel positive for enterovirus/ rhinovirus   - monitor off antibiotics since symptoms likely due to viral infection   - f/u procalcitonin     Pt's daughter reports that pt can become restless/agitated at night.   - zyprexa 2.5mg qHS prn          88 -year-old female with past medical history of A-fib on Eliquis, CHF on Lasix, dementia was brought in by EMS from [facility] assisted living for fever since yesterday and cough for a few days.      #Fever and cough- suspect PNA  - T max 100 and SpO2 lowest 91% in ED  - cont with Rocephin and Azithromycin   - CXR negative  - RVP pending   - check procalcitonin  - Robitussin q8  - check sputum, blood cx.   - check Legionella and strep pneumo ag     #CHF  - pro-bnp 1268, at pt's baseline  - appears euvolemic clinically   - cont with PO lasix 20mg  -Toprol  - check Echo    #HTN  -cont with Toprol 25mg    #A fib   -cont Toprol and Eliquis     #DVT ppx: on Eliquis    Code: DNR/DNI. confirmed with pt's daughter that pt is DNR DNI    updated pt's daughter, Silvia, on the phone.       Update:    RVP panel positive for enterovirus/ rhinovirus   - monitor off antibiotics since symptoms likely due to viral infection   - f/u procalcitonin     Pt's daughter reports that pt can become restless/agitated at night.   - zyprexa 2.5mg qHS prn          88 -year-old female with past medical history of A-fib on Eliquis, CHF on Lasix, dementia was brought in by EMS from [facility] assisted living for fever since yesterday and cough for a few days.      #Fever and cough- suspect PNA  - T max 100 and SpO2 lowest 91% in ED  - cont with Rocephin and Azithromycin   - CXR negative  - RVP pending   - check procalcitonin  - Robitussin q8  - check sputum, blood cx.   - check Legionella and strep pneumo ag     #CHF  - pro-bnp 1268, at pt's baseline  - appears euvolemic clinically   - cont with PO lasix 20mg  -Toprol  - check Echo    #HTN  -cont with Toprol 25mg    #A fib   -cont Toprol and Eliquis     #DVT ppx: on Eliquis    Code: DNR/DNI. confirmed with pt's daughter that pt is DNR DNI    updated pt's daughter, Silvia, on the phone.       Update:    RVP panel positive for enterovirus/ rhinovirus   - monitor off antibiotics since symptoms likely due to viral infection   - f/u procalcitonin     Pt's daughter reports that pt can become restless/agitated at night.   - zyprexa 2.5mg qHS prn

## 2023-07-08 NOTE — H&P ADULT - NSHPPHYSICALEXAM_GEN_ALL_CORE
T(C): 37.3 (07-08-23 @ 11:40), Max: 37.8 (07-08-23 @ 11:13)  HR: 60 (07-08-23 @ 11:13) (60 - 60)  BP: 124/60 (07-08-23 @ 11:13) (124/60 - 124/60)  RR: 17 (07-08-23 @ 11:13) (17 - 17)  SpO2: 95% (07-08-23 @ 11:13) (95% - 95%)  Wt(kg): --Vital Signs Last 24 Hrs  T(C): 37.3 (08 Jul 2023 11:40), Max: 37.8 (08 Jul 2023 11:13)  T(F): 99.1 (08 Jul 2023 11:40), Max: 100 (08 Jul 2023 11:13)  HR: 60 (08 Jul 2023 11:13) (60 - 60)  BP: 124/60 (08 Jul 2023 11:13) (124/60 - 124/60)  BP(mean): --  RR: 17 (08 Jul 2023 11:13) (17 - 17)  SpO2: 95% (08 Jul 2023 11:13) (95% - 95%)    Parameters below as of 08 Jul 2023 11:13  Patient On (Oxygen Delivery Method): room air        PHYSICAL EXAM:  GENERAL: NAD, well-groomed, well-developed  HEAD:  Atraumatic, Normocephalic  EYES: EOMI, PERRLA, conjunctiva and sclera clear  ENMT: No tonsillar erythema, exudates, or enlargement; Moist mucous membranes, Good dentition, No lesions  NECK: Supple, No JVD, Normal thyroid  NERVOUS SYSTEM:  Alert & Oriented X3, Good concentration; Motor Strength 5/5 B/L upper and lower extremities; DTRs 2+ intact and symmetric  CHEST/LUNG: Clear to percussion bilaterally; No rales, rhonchi, wheezing, or rubs  HEART: Regular rate and rhythm; No murmurs, rubs, or gallops  ABDOMEN: Soft, Nontender, Nondistended; Bowel sounds present  EXTREMITIES:  2+ Peripheral Pulses, No clubbing, cyanosis, or edema  LYMPH: No lymphadenopathy noted  SKIN: No rashes or lesions T(C): 37.3 (07-08-23 @ 11:40), Max: 37.8 (07-08-23 @ 11:13)  HR: 60 (07-08-23 @ 11:13) (60 - 60)  BP: 124/60 (07-08-23 @ 11:13) (124/60 - 124/60)  RR: 17 (07-08-23 @ 11:13) (17 - 17)  SpO2: 95% (07-08-23 @ 11:13) (95% - 95%)  Wt(kg): --Vital Signs Last 24 Hrs  T(C): 37.3 (08 Jul 2023 11:40), Max: 37.8 (08 Jul 2023 11:13)  T(F): 99.1 (08 Jul 2023 11:40), Max: 100 (08 Jul 2023 11:13)  HR: 60 (08 Jul 2023 11:13) (60 - 60)  BP: 124/60 (08 Jul 2023 11:13) (124/60 - 124/60)  BP(mean): --  RR: 17 (08 Jul 2023 11:13) (17 - 17)  SpO2: 95% (08 Jul 2023 11:13) (95% - 95%)    Parameters below as of 08 Jul 2023 11:13  Patient On (Oxygen Delivery Method): room air        PHYSICAL EXAM:  GENERAL: NAD, frequent coughing  HEAD:  Atraumatic, Normocephalic  EYES: EOMI, PERRLA, conjunctiva and sclera clear  ENMT: No tonsillar erythema, exudates, or enlargement; Moist mucous membranes, Good dentition, No lesions  NECK: Supple, No JVD  NERVOUS SYSTEM:  answering questions appropriately; moving all extremities  CHEST/LUNG: + rhonchi . no wheezing  HEART: Regular rate and rhythm; No murmurs  ABDOMEN: Soft, Nontender, Nondistended; Bowel sounds present  EXTREMITIES:   No clubbing, cyanosis, or edema  SKIN: No rashes

## 2023-07-08 NOTE — PATIENT PROFILE ADULT - FALL HARM RISK - HARM RISK INTERVENTIONS
Assistance with ambulation/Assistance OOB with selected safe patient handling equipment/Communicate Risk of Fall with Harm to all staff/Discuss with provider need for PT consult/Monitor for mental status changes/Monitor gait and stability/Move patient closer to nurses' station/Provide patient with walking aids - walker, cane, crutches/Reinforce activity limits and safety measures with patient and family/Reorient to person, place and time as needed/Tailored Fall Risk Interventions/Toileting schedule using arm’s reach rule for commode and bathroom/Use of alarms - bed, chair and/or voice tab/Visual Cue: Yellow wristband and red socks/Bed in lowest position, wheels locked, appropriate side rails in place/Call bell, personal items and telephone in reach/Instruct patient to call for assistance before getting out of bed or chair/Non-slip footwear when patient is out of bed/[place] to call system/Physically safe environment - no spills, clutter or unnecessary equipment/Purposeful Proactive Rounding/Room/bathroom lighting operational, light cord in reach Assistance with ambulation/Assistance OOB with selected safe patient handling equipment/Communicate Risk of Fall with Harm to all staff/Discuss with provider need for PT consult/Monitor for mental status changes/Monitor gait and stability/Move patient closer to nurses' station/Provide patient with walking aids - walker, cane, crutches/Reinforce activity limits and safety measures with patient and family/Reorient to person, place and time as needed/Tailored Fall Risk Interventions/Toileting schedule using arm’s reach rule for commode and bathroom/Use of alarms - bed, chair and/or voice tab/Visual Cue: Yellow wristband and red socks/Bed in lowest position, wheels locked, appropriate side rails in place/Call bell, personal items and telephone in reach/Instruct patient to call for assistance before getting out of bed or chair/Non-slip footwear when patient is out of bed/[place] to call system/Physically safe environment - no spills, clutter or unnecessary equipment/Purposeful Proactive Rounding/Room/bathroom lighting operational, light cord in reach Assistance with ambulation/Assistance OOB with selected safe patient handling equipment/Communicate Risk of Fall with Harm to all staff/Discuss with provider need for PT consult/Monitor for mental status changes/Monitor gait and stability/Move patient closer to nurses' station/Provide patient with walking aids - walker, cane, crutches/Reinforce activity limits and safety measures with patient and family/Reorient to person, place and time as needed/Tailored Fall Risk Interventions/Toileting schedule using arm’s reach rule for commode and bathroom/Use of alarms - bed, chair and/or voice tab/Visual Cue: Yellow wristband and red socks/Bed in lowest position, wheels locked, appropriate side rails in place/Call bell, personal items and telephone in reach/Instruct patient to call for assistance before getting out of bed or chair/Non-slip footwear when patient is out of bed/[place] to call system/Physically safe environment - no spills, clutter or unnecessary equipment/Purposeful Proactive Rounding/Room/bathroom lighting operational, light cord in reach

## 2023-07-08 NOTE — H&P ADULT - HISTORY OF PRESENT ILLNESS
88 -year-old female with past medical history of A-fib on Eliquis CHF on Lasix, dementia was brought in by EMS from [facility] assisted [facility] for fever and cough since yesterday. Pt denies chest pain, SOB, abd pain, nausea, vomiting, diarrhea all other complaints.    In ED, T max 100, HR 60, /60, SpO2 91-95% on RA  88 -year-old female with past medical history of A-fib on Eliquis CHF on Lasix, dementia was brought in by EMS from [facility] assisted [facility] for fever and cough since yesterday. Pt denies chest pain, SOB, abd pain, nausea, vomiting, diarrhea all other complaints.    In ED, T max 100, HR 60, /60, SpO2 91-95% on RA  88 -year-old female with past medical history of A-fib on Eliquis CHF on Lasix, dementia was brought in by EMS from [facility] assisted [facility] for fever and cough since yesterday. Pt denies chest pain, SOB, abd pain, nausea, vomiting, diarrhea all other complaints.    In ED, T max 100, HR 60, /60, SpO2 91-95% on RA  88 -year-old female with past medical history of A-fib on Eliquis CHF on Lasix, dementia was brought in by EMS from [facility] assisted living for fever since yesterday and cough for a few days. Pt reports chest congestion and non-productive cough. denies SOB, chest pain, abd pain, nausea, vomiting, diarrhea, leg pain, leg swelling.    In ED, T max 100, HR 60, /60, SpO2 95% on RA   RVP negative  88 -year-old female with past medical history of A-fib on Eliquis CHF on Lasix, dementia was brought in by EMS from [facility] assisted living for fever since yesterday and cough for a few days. Pt reports chest congestion and non-productive cough. denies SOB, chest pain, abd pain, nausea, vomiting, diarrhea, leg pain, leg swelling.    In ED, T max 100, HR 60, /60, SpO2 95% on RA   RVP negative  88 -year-old female with past medical history of A-fib on Eliquis CHF on Lasix, dementia was brought in by EMS from [facility] assisted living for fever since yesterday and cough for a few days. Pt reports chest congestion and non-productive cough. denies SOB, chest pain, abd pain, nausea, vomiting, diarrhea, leg pain, leg swelling.    In ED, T max 100, HR 60, /60, SpO2 95% on RA   RVP negative

## 2023-07-08 NOTE — ED ADULT NURSE NOTE - NSFALLHARMRISKINTERV_ED_ALL_ED
Assistance OOB with selected safe patient handling equipment if applicable/Assistance with ambulation/Communicate risk of Fall with Harm to all staff, patient, and family/Monitor gait and stability/Provide patient with walking aids/Provide visual cue: red socks, yellow wristband, yellow gown, etc/Reinforce activity limits and safety measures with patient and family/Bed in lowest position, wheels locked, appropriate side rails in place/Call bell, personal items and telephone in reach/Instruct patient to call for assistance before getting out of bed/chair/stretcher/Non-slip footwear applied when patient is off stretcher/[place] to call system/Physically safe environment - no spills, clutter or unnecessary equipment/Purposeful Proactive Rounding/Room/bathroom lighting operational, light cord in reach Assistance OOB with selected safe patient handling equipment if applicable/Assistance with ambulation/Communicate risk of Fall with Harm to all staff, patient, and family/Monitor gait and stability/Provide patient with walking aids/Provide visual cue: red socks, yellow wristband, yellow gown, etc/Reinforce activity limits and safety measures with patient and family/Bed in lowest position, wheels locked, appropriate side rails in place/Call bell, personal items and telephone in reach/Instruct patient to call for assistance before getting out of bed/chair/stretcher/Non-slip footwear applied when patient is off stretcher/[place] to call system/Physically safe environment - no spills, clutter or unnecessary equipment/Purposeful Proactive Rounding/Room/bathroom lighting operational, light cord in reach Assistance OOB with selected safe patient handling equipment if applicable/Assistance with ambulation/Communicate risk of Fall with Harm to all staff, patient, and family/Monitor gait and stability/Provide patient with walking aids/Provide visual cue: red socks, yellow wristband, yellow gown, etc/Reinforce activity limits and safety measures with patient and family/Bed in lowest position, wheels locked, appropriate side rails in place/Call bell, personal items and telephone in reach/Instruct patient to call for assistance before getting out of bed/chair/stretcher/Non-slip footwear applied when patient is off stretcher/[place] to call system/Physically safe environment - no spills, clutter or unnecessary equipment/Purposeful Proactive Rounding/Room/bathroom lighting operational, light cord in reach

## 2023-07-08 NOTE — H&P ADULT - NSHPLABSRESULTS_GEN_ALL_CORE
LABS:                        11.0   11.45 )-----------( 196      ( 08 Jul 2023 11:40 )             33.6     07-08    133<L>  |  97  |  17  ----------------------------<  118<H>  3.9   |  30  |  0.78    Ca    8.9      08 Jul 2023 11:40    TPro  7.3  /  Alb  3.3  /  TBili  0.9  /  DBili  x   /  AST  19  /  ALT  12  /  AlkPhos  81  07-08    PT/INR - ( 08 Jul 2023 11:40 )   PT: 18.3 sec;   INR: 1.57 ratio         PTT - ( 08 Jul 2023 11:40 )  PTT:34.7 sec  Urinalysis Basic - ( 08 Jul 2023 11:40 )    Color: x / Appearance: x / SG: x / pH: x  Gluc: 118 mg/dL / Ketone: x  / Bili: x / Urobili: x   Blood: x / Protein: x / Nitrite: x   Leuk Esterase: x / RBC: x / WBC x   Sq Epi: x / Non Sq Epi: x / Bacteria: x       CAPILLARY BLOOD GLUCOSE            Urinalysis Basic - ( 08 Jul 2023 11:40 )    Color: x / Appearance: x / SG: x / pH: x  Gluc: 118 mg/dL / Ketone: x  / Bili: x / Urobili: x   Blood: x / Protein: x / Nitrite: x   Leuk Esterase: x / RBC: x / WBC x   Sq Epi: x / Non Sq Epi: x / Bacteria: x        RADIOLOGY & ADDITIONAL TESTS:    Consultant(s) Notes Reviewed:  [x ] YES  [ ] NO  Care Discussed with Consultants/Other Providers [ x] YES  [ ] NO ED Brenna JJ   Imaging Personally Reviewed:  [ ] YES  [ ] NO

## 2023-07-08 NOTE — ED ADULT NURSE NOTE - CAS EDP DISCH DISPOSITION ADMI
[facility] [place]

## 2023-07-08 NOTE — ED PROVIDER NOTE - PHYSICAL EXAMINATION
Gen: Well appearing in NAD.   ENT: oral mucosa moist, pharynx unremarkable  Head: atraumatic  Heart: s1/s2, RRR  Lung: CTA b/l  Abd: soft, NT/ND, no rebound or guarding  Msk: no pedal edema or calf pain,  Neuro: AAO x2, pt moving all extremity equally. No focal neuro deficits   Skin: Normal for race. No rashes  Psych: Alert and oriented

## 2023-07-08 NOTE — PATIENT PROFILE ADULT - ARRIVAL FROM
[facility] home

## 2023-07-09 ENCOUNTER — TRANSCRIPTION ENCOUNTER (OUTPATIENT)
Age: 88
End: 2023-07-09

## 2023-07-09 LAB
ANION GAP SERPL CALC-SCNC: 10 MMOL/L — SIGNIFICANT CHANGE UP (ref 5–17)
BASOPHILS # BLD AUTO: 0.04 K/UL — SIGNIFICANT CHANGE UP (ref 0–0.2)
BASOPHILS NFR BLD AUTO: 0.4 % — SIGNIFICANT CHANGE UP (ref 0–2)
BUN SERPL-MCNC: 14 MG/DL — SIGNIFICANT CHANGE UP (ref 7–23)
CALCIUM SERPL-MCNC: 8.9 MG/DL — SIGNIFICANT CHANGE UP (ref 8.4–10.5)
CHLORIDE SERPL-SCNC: 102 MMOL/L — SIGNIFICANT CHANGE UP (ref 96–108)
CO2 SERPL-SCNC: 25 MMOL/L — SIGNIFICANT CHANGE UP (ref 22–31)
CREAT SERPL-MCNC: 0.66 MG/DL — SIGNIFICANT CHANGE UP (ref 0.5–1.3)
EGFR: 84 ML/MIN/1.73M2 — SIGNIFICANT CHANGE UP
EOSINOPHIL # BLD AUTO: 0.09 K/UL — SIGNIFICANT CHANGE UP (ref 0–0.5)
EOSINOPHIL NFR BLD AUTO: 0.9 % — SIGNIFICANT CHANGE UP (ref 0–6)
GLUCOSE SERPL-MCNC: 113 MG/DL — HIGH (ref 70–99)
HCT VFR BLD CALC: 32 % — LOW (ref 34.5–45)
HGB BLD-MCNC: 10.5 G/DL — LOW (ref 11.5–15.5)
IMM GRANULOCYTES NFR BLD AUTO: 0.2 % — SIGNIFICANT CHANGE UP (ref 0–0.9)
LYMPHOCYTES # BLD AUTO: 1.55 K/UL — SIGNIFICANT CHANGE UP (ref 1–3.3)
LYMPHOCYTES # BLD AUTO: 15.4 % — SIGNIFICANT CHANGE UP (ref 13–44)
MAGNESIUM SERPL-MCNC: 1.9 MG/DL — SIGNIFICANT CHANGE UP (ref 1.6–2.6)
MCHC RBC-ENTMCNC: 29.6 PG — SIGNIFICANT CHANGE UP (ref 27–34)
MCHC RBC-ENTMCNC: 32.8 GM/DL — SIGNIFICANT CHANGE UP (ref 32–36)
MCV RBC AUTO: 90.1 FL — SIGNIFICANT CHANGE UP (ref 80–100)
MONOCYTES # BLD AUTO: 1.11 K/UL — HIGH (ref 0–0.9)
MONOCYTES NFR BLD AUTO: 11 % — SIGNIFICANT CHANGE UP (ref 2–14)
NEUTROPHILS # BLD AUTO: 7.26 K/UL — SIGNIFICANT CHANGE UP (ref 1.8–7.4)
NEUTROPHILS NFR BLD AUTO: 72.1 % — SIGNIFICANT CHANGE UP (ref 43–77)
NRBC # BLD: 0 /100 WBCS — SIGNIFICANT CHANGE UP (ref 0–0)
PHOSPHATE SERPL-MCNC: 2.4 MG/DL — LOW (ref 2.5–4.5)
PLATELET # BLD AUTO: 195 K/UL — SIGNIFICANT CHANGE UP (ref 150–400)
POTASSIUM SERPL-MCNC: 3.5 MMOL/L — SIGNIFICANT CHANGE UP (ref 3.5–5.3)
POTASSIUM SERPL-SCNC: 3.5 MMOL/L — SIGNIFICANT CHANGE UP (ref 3.5–5.3)
RBC # BLD: 3.55 M/UL — LOW (ref 3.8–5.2)
RBC # FLD: 15.1 % — HIGH (ref 10.3–14.5)
SODIUM SERPL-SCNC: 137 MMOL/L — SIGNIFICANT CHANGE UP (ref 135–145)
WBC # BLD: 10.07 K/UL — SIGNIFICANT CHANGE UP (ref 3.8–10.5)
WBC # FLD AUTO: 10.07 K/UL — SIGNIFICANT CHANGE UP (ref 3.8–10.5)

## 2023-07-09 PROCEDURE — 93306 TTE W/DOPPLER COMPLETE: CPT | Mod: 26

## 2023-07-09 PROCEDURE — 99232 SBSQ HOSP IP/OBS MODERATE 35: CPT

## 2023-07-09 RX ORDER — ACETAMINOPHEN 500 MG
2 TABLET ORAL
Qty: 0 | Refills: 0 | DISCHARGE
Start: 2023-07-09

## 2023-07-09 RX ORDER — OLANZAPINE 15 MG/1
2.5 TABLET, FILM COATED ORAL ONCE
Refills: 0 | Status: COMPLETED | OUTPATIENT
Start: 2023-07-09 | End: 2023-07-09

## 2023-07-09 RX ORDER — POTASSIUM PHOSPHATE, MONOBASIC POTASSIUM PHOSPHATE, DIBASIC 236; 224 MG/ML; MG/ML
15 INJECTION, SOLUTION INTRAVENOUS ONCE
Refills: 0 | Status: COMPLETED | OUTPATIENT
Start: 2023-07-09 | End: 2023-07-09

## 2023-07-09 RX ORDER — TRAMADOL HYDROCHLORIDE 50 MG/1
50 TABLET ORAL THREE TIMES A DAY
Refills: 0 | Status: DISCONTINUED | OUTPATIENT
Start: 2023-07-09 | End: 2023-07-10

## 2023-07-09 RX ADMIN — TRAMADOL HYDROCHLORIDE 50 MILLIGRAM(S): 50 TABLET ORAL at 19:28

## 2023-07-09 RX ADMIN — TRAMADOL HYDROCHLORIDE 50 MILLIGRAM(S): 50 TABLET ORAL at 18:28

## 2023-07-09 RX ADMIN — Medication 1 TABLET(S): at 12:10

## 2023-07-09 RX ADMIN — APIXABAN 2.5 MILLIGRAM(S): 2.5 TABLET, FILM COATED ORAL at 19:58

## 2023-07-09 RX ADMIN — POTASSIUM PHOSPHATE, MONOBASIC POTASSIUM PHOSPHATE, DIBASIC 62.5 MILLIMOLE(S): 236; 224 INJECTION, SOLUTION INTRAVENOUS at 12:09

## 2023-07-09 RX ADMIN — TRAMADOL HYDROCHLORIDE 50 MILLIGRAM(S): 50 TABLET ORAL at 22:30

## 2023-07-09 RX ADMIN — Medication 10 MILLILITER(S): at 14:58

## 2023-07-09 RX ADMIN — APIXABAN 2.5 MILLIGRAM(S): 2.5 TABLET, FILM COATED ORAL at 06:11

## 2023-07-09 RX ADMIN — OLANZAPINE 2.5 MILLIGRAM(S): 15 TABLET, FILM COATED ORAL at 00:56

## 2023-07-09 RX ADMIN — OLANZAPINE 2.5 MILLIGRAM(S): 15 TABLET, FILM COATED ORAL at 21:31

## 2023-07-09 RX ADMIN — TRAMADOL HYDROCHLORIDE 50 MILLIGRAM(S): 50 TABLET ORAL at 21:31

## 2023-07-09 RX ADMIN — Medication 25 MILLIGRAM(S): at 06:10

## 2023-07-09 RX ADMIN — Medication 10 MILLILITER(S): at 21:40

## 2023-07-09 RX ADMIN — Medication 20 MILLIGRAM(S): at 06:11

## 2023-07-09 RX ADMIN — PREGABALIN 1000 MICROGRAM(S): 225 CAPSULE ORAL at 12:10

## 2023-07-09 RX ADMIN — Medication 10 MILLILITER(S): at 06:10

## 2023-07-09 NOTE — DISCHARGE NOTE PROVIDER - ATTENDING DISCHARGE PHYSICAL EXAMINATION:
Gen: NAD  Lung: CTA b/l no wheezing  Neuro: Alert, follows simple commands, grossly moves all extremities

## 2023-07-09 NOTE — PROGRESS NOTE ADULT - SUBJECTIVE AND OBJECTIVE BOX
Patient is a 88y old  Female who presents with a chief complaint of cough (08 Jul 2023 12:39)    Patient seen and examined at bedside.  no acute overnight events    ALLERGIES:  penicillin (Unknown)        Vital Signs Last 24 Hrs  T(F): 99 (09 Jul 2023 05:33), Max: 100 (08 Jul 2023 11:13)  HR: 97 (09 Jul 2023 05:33) (60 - 97)  BP: 124/71 (09 Jul 2023 05:33) (104/65 - 133/63)  RR: 18 (09 Jul 2023 05:33) (17 - 18)  SpO2: 93% (09 Jul 2023 05:33) (93% - 95%)  I&O's Summary    08 Jul 2023 07:01  -  09 Jul 2023 07:00  --------------------------------------------------------  IN: 0 mL / OUT: 1 mL / NET: -1 mL      MEDICATIONS:  acetaminophen     Tablet .. 650 milliGRAM(s) Oral every 6 hours PRN  acetaminophen     Tablet .. 650 milliGRAM(s) Oral every 6 hours PRN  albuterol    0.083% 2.5 milliGRAM(s) Nebulizer every 6 hours PRN  aluminum hydroxide/magnesium hydroxide/simethicone Suspension 30 milliLiter(s) Oral every 4 hours PRN  apixaban 2.5 milliGRAM(s) Oral two times a day  cyanocobalamin 1000 MICROGram(s) Oral daily  furosemide    Tablet 20 milliGRAM(s) Oral daily  guaifenesin/dextromethorphan Oral Liquid 10 milliLiter(s) Oral every 8 hours  melatonin 3 milliGRAM(s) Oral at bedtime PRN  metoprolol succinate ER 25 milliGRAM(s) Oral daily  multivitamin 1 Tablet(s) Oral daily  OLANZapine 2.5 milliGRAM(s) Oral at bedtime PRN  ondansetron Injectable 4 milliGRAM(s) IV Push every 8 hours PRN  potassium phosphate IVPB 15 milliMole(s) IV Intermittent once  traMADol 50 milliGRAM(s) Oral three times a day PRN      PHYSICAL EXAM:  General: NAD, Alert, elderly female  ENT: MMM, no thrush  Neck: Supple, No JVD  Lungs: Clear to auscultation bilaterally, non labored, good air entry  Cardio: RRR, S1/S2, No murmurs  Abdomen: Soft, Nontender, Nondistended; Bowel sounds present  Extremities: No cyanosis, No edema    LABS:                        10.5   10.07 )-----------( 195      ( 09 Jul 2023 06:17 )             32.0     07-09    137  |  102  |  14  ----------------------------<  113  3.5   |  25  |  0.66    Ca    8.9      09 Jul 2023 06:17  Phos  2.4     07-09  Mg     1.9     07-09    TPro  7.3  /  Alb  3.3  /  TBili  0.9  /  DBili  x   /  AST  19  /  ALT  12  /  AlkPhos  81  07-08      PT/INR - ( 08 Jul 2023 11:40 )   PT: 18.3 sec;   INR: 1.57 ratio         PTT - ( 08 Jul 2023 11:40 )  PTT:34.7 sec  Lactate, Blood: 0.6 mmol/L (07-08 @ 11:40)                          Urinalysis Basic - ( 09 Jul 2023 06:17 )    Color: x / Appearance: x / SG: x / pH: x  Gluc: 113 mg/dL / Ketone: x  / Bili: x / Urobili: x   Blood: x / Protein: x / Nitrite: x   Leuk Esterase: x / RBC: x / WBC x   Sq Epi: x / Non Sq Epi: x / Bacteria: x            RADIOLOGY & ADDITIONAL TESTS:    Care Discussed with Consultants/Other Providers:    Patient is a 88y old  Female who presents with a chief complaint of cough (2023 12:39)    Patient seen and examined at bedside.  no acute overnight events    ALLERGIES:  penicillin (Unknown)        Vital Signs Last 24 Hrs  T(F): 99 (2023 05:33), Max: 100 (2023 11:13)  HR: 97 (2023 05:33) (60 - 97)  BP: 124/71 (2023 05:33) (104/65 - 133/63)  RR: 18 (2023 05:33) (17 - 18)  SpO2: 93% (2023 05:33) (93% - 95%)  I&O's Summary    2023 07:01  -  2023 07:00  --------------------------------------------------------  IN: 0 mL / OUT: 1 mL / NET: -1 mL      MEDICATIONS:  acetaminophen     Tablet .. 650 milliGRAM(s) Oral every 6 hours PRN  acetaminophen     Tablet .. 650 milliGRAM(s) Oral every 6 hours PRN  albuterol    0.083% 2.5 milliGRAM(s) Nebulizer every 6 hours PRN  aluminum hydroxide/magnesium hydroxide/simethicone Suspension 30 milliLiter(s) Oral every 4 hours PRN  apixaban 2.5 milliGRAM(s) Oral two times a day  cyanocobalamin 1000 MICROGram(s) Oral daily  furosemide    Tablet 20 milliGRAM(s) Oral daily  guaifenesin/dextromethorphan Oral Liquid 10 milliLiter(s) Oral every 8 hours  melatonin 3 milliGRAM(s) Oral at bedtime PRN  metoprolol succinate ER 25 milliGRAM(s) Oral daily  multivitamin 1 Tablet(s) Oral daily  OLANZapine 2.5 milliGRAM(s) Oral at bedtime PRN  ondansetron Injectable 4 milliGRAM(s) IV Push every 8 hours PRN  potassium phosphate IVPB 15 milliMole(s) IV Intermittent once  traMADol 50 milliGRAM(s) Oral three times a day PRN      PHYSICAL EXAM:  General: NAD, Alert, elderly female  ENT: MMM, no thrush  Neck: Supple, No JVD  Lungs: Clear to auscultation bilaterally, non labored, good air entry  Cardio: IRRR, S1/S2, No murmurs  Abdomen: Soft, Nontender, Nondistended; Bowel sounds present  Extremities: No cyanosis, No edema    LABS:                        10.5   10.07 )-----------( 195      ( 2023 06:17 )             32.0     07-    137  |  102  |  14  ----------------------------<  113  3.5   |  25  |  0.66    Ca    8.9      2023 06:17  Phos  2.4     07-  Mg     1.9     07-    TPro  7.3  /  Alb  3.3  /  TBili  0.9  /  DBili  x   /  AST  19  /  ALT  12  /  AlkPhos  81  07-08      PT/INR - ( 2023 11:40 )   PT: 18.3 sec;   INR: 1.57 ratio         PTT - ( 2023 11:40 )  PTT:34.7 sec  Lactate, Blood: 0.6 mmol/L ( @ 11:40)                          Urinalysis Basic - ( 2023 06:17 )    Color: x / Appearance: x / SG: x / pH: x  Gluc: 113 mg/dL / Ketone: x  / Bili: x / Urobili: x   Blood: x / Protein: x / Nitrite: x   Leuk Esterase: x / RBC: x / WBC x   Sq Epi: x / Non Sq Epi: x / Bacteria: x            RADIOLOGY & ADDITIONAL TESTS:  < from: TTE Echo Complete w/o Contrast w/ Doppler (23 @ 10:51) >    ACC: [id number] EXAM:  ECHO TTE WO CON COMP W DOPP                          PROCEDURE DATE:  2023          INTERPRETATION:  TRANSTHORACIC ECHOCARDIOGRAM REPORT        Patient Name:   NERY HARRIS Patient Location: [address] [place] [address] Rec #:  [id number]       Accession #:      [id number]  Account #:      [id number]       Height:           61.8 in 157.0 cm  YOB: 1935       Weight:           156.5 lb 71.00 kg  Patient Age:    88 years       BSA:              1.72 m²  Patient Gender: F        BP:               0/0 mmHg      Date of Exam:        2023 10:51:26 AM  Sonographer:         JEREMIE  Referring Physician: MAN    Procedure:     2D Echo/Doppler/Color Doppler Complete.  Indications:   R06.00 - Dyspnea, unspecified  Diagnosis:  R06.00 - Dyspnea, unspecified  Study Details: Technically fair study.        2D AND M-MODE MEASUREMENTS (normal ranges within parentheses):  Left Ventricle:                  Normal   Aorta/Left Atrium:               Normal  IVSd (2D):              1.00 cm (0.7-1.1) Aortic Root (Mmode): 2.36 cm   (2.4-3.7)  LVPWd (2D):             0.93 cm (0.7-1.1) AoV Cusp Separation: 1.77 cm   (1.5-2.6)  LVIDd (2D):             4.71 cm (3.4-5.7) Left Atrium (Mmode): 4.92 cm   (1.9-4.0)  LVIDs (2D):3.01 cm  LV FS (2D):             36.1 %   (>25%)  LV EF (2D):              66 %    (>55%)  Relative Wall Thickness  0.39    (<0.42)    LV DIASTOLIC FUNCTION:  MV Peak E: 1.22 m/s Decel Time: 129 msec  MV Peak A: 0.34 m/s  E/A Ratio: 3.63    SPECTRAL DOPPLER ANALYSIS (where applicable):  Mitral Valve:  MV Max Cody: 0.75 m/s MV P1/2 Time: 37.43 msec                       MV Area, PHT: 5.88 cm²    Aortic Valve: AoV Max Cody: 1.10 m/s AoV Peak P.9 mmHg AoV Mean P.9 mmHg    LVOT Vmax: 0.53 m/sLVOT VTI: 0.116 m LVOT Diameter: 1.90 cm    AoV Area, Vmax: 1.35 cm² AoV Area, VTI: 1.31 cm² AoV Area, Vmn: 1.19 cm²  Ao VTI: 0.249  Tricuspid Valve and PA/RV Systolic Pressure: TR Max Velocity: 2.33 m/s RA   Pressure: 10 mmHg RVSP/PASP: 31.6 mmHg      PHYSICIAN INTERPRETATION:  Left Ventricle: The left ventricular internal cavity size is normal. Left   ventricular wall thickness is normal.  Global LV systolic function was normal. Left ventricular ejection   fraction, by visual estimation, is 55 to 60%. The left ventricular   diastolic function could not be assessed in this study.  Right Ventricle: The right ventricular size is mildly enlarged.  Left Atrium: Moderately enlarged left atrium.  Right Atrium: Moderately enlarged right atrium.  Pericardium: There is no evidence of pericardial effusion. There is a   moderate pleural effusion in both left and right lateral regions.  Mitral Valve: Thickening of the anterior and posterior mitral valve   leaflets. There is moderate mitral annular calcification. Mild mitral   valve regurgitation is seen.  Tricuspid Valve: The tricuspid valve is normal in structure.   Mild-moderate tricuspid regurgitation is visualized.  Aortic Valve: Sclerotic aortic valve with normal opening.  Pulmonic Valve: The pulmonic valve is normal. Mild pulmonic valve   regurgitation.  Aorta: The aortic root is normal in size and structure.      Summary:   1. Marked biatrial enlargement   2. Left ventricular ejection fraction, by visual estimation, is 55 to   60%.   3. Normal global left ventricular systolic function.   4. The left ventricular diastolic function could not be assessed in this   study.   5. Mildly enlarged right ventricle.   6. Moderate mitral annular calcification.   7. Mild mitral valve regurgitation.   8. Thickening of the anterior and posterior mitral valve leaflets.   9. Mild-moderate tricuspid regurgitation.  10. Mild pulmonic valve regurgitation.    [id number] Chapin Tenorio , Electronically signed on 2023   at 12:51:32 PM            *** Final ***    < end of copied text >      Care Discussed with Consultants/Other Providers:    Patient is a 88y old  Female who presents with a chief complaint of cough (2023 12:39)    Patient seen and examined at bedside.  no acute overnight events    ALLERGIES:  penicillin (Unknown)        Vital Signs Last 24 Hrs  T(F): 99 (2023 05:33), Max: 100 (2023 11:13)  HR: 97 (2023 05:33) (60 - 97)  BP: 124/71 (2023 05:33) (104/65 - 133/63)  RR: 18 (2023 05:33) (17 - 18)  SpO2: 93% (2023 05:33) (93% - 95%)  I&O's Summary    2023 07:01  -  2023 07:00  --------------------------------------------------------  IN: 0 mL / OUT: 1 mL / NET: -1 mL      MEDICATIONS:  acetaminophen     Tablet .. 650 milliGRAM(s) Oral every 6 hours PRN  acetaminophen     Tablet .. 650 milliGRAM(s) Oral every 6 hours PRN  albuterol    0.083% 2.5 milliGRAM(s) Nebulizer every 6 hours PRN  aluminum hydroxide/magnesium hydroxide/simethicone Suspension 30 milliLiter(s) Oral every 4 hours PRN  apixaban 2.5 milliGRAM(s) Oral two times a day  cyanocobalamin 1000 MICROGram(s) Oral daily  furosemide    Tablet 20 milliGRAM(s) Oral daily  guaifenesin/dextromethorphan Oral Liquid 10 milliLiter(s) Oral every 8 hours  melatonin 3 milliGRAM(s) Oral at bedtime PRN  metoprolol succinate ER 25 milliGRAM(s) Oral daily  multivitamin 1 Tablet(s) Oral daily  OLANZapine 2.5 milliGRAM(s) Oral at bedtime PRN  ondansetron Injectable 4 milliGRAM(s) IV Push every 8 hours PRN  potassium phosphate IVPB 15 milliMole(s) IV Intermittent once  traMADol 50 milliGRAM(s) Oral three times a day PRN      PHYSICAL EXAM:  General: NAD, Alert, elderly female  ENT: MMM, no thrush  Neck: Supple, No JVD  Lungs: Clear to auscultation bilaterally, non labored, good air entry  Cardio: IRRR, S1/S2, No murmurs  Abdomen: Soft, Nontender, Nondistended; Bowel sounds present  Extremities: No cyanosis, No edema    LABS:                        10.5   10.07 )-----------( 195      ( 2023 06:17 )             32.0     07-    137  |  102  |  14  ----------------------------<  113  3.5   |  25  |  0.66    Ca    8.9      2023 06:17  Phos  2.4     07-  Mg     1.9     07-    TPro  7.3  /  Alb  3.3  /  TBili  0.9  /  DBili  x   /  AST  19  /  ALT  12  /  AlkPhos  81  07-08      PT/INR - ( 2023 11:40 )   PT: 18.3 sec;   INR: 1.57 ratio         PTT - ( 2023 11:40 )  PTT:34.7 sec  Lactate, Blood: 0.6 mmol/L ( @ 11:40)                          Urinalysis Basic - ( 2023 06:17 )    Color: x / Appearance: x / SG: x / pH: x  Gluc: 113 mg/dL / Ketone: x  / Bili: x / Urobili: x   Blood: x / Protein: x / Nitrite: x   Leuk Esterase: x / RBC: x / WBC x   Sq Epi: x / Non Sq Epi: x / Bacteria: x            RADIOLOGY & ADDITIONAL TESTS:  < from: TTE Echo Complete w/o Contrast w/ Doppler (23 @ 10:51) >    ACC: [id number] EXAM:  ECHO TTE WO CON COMP W DOPP                          PROCEDURE DATE:  2023          INTERPRETATION:  TRANSTHORACIC ECHOCARDIOGRAM REPORT        Patient Name:   NERY HARRIS Patient Location: [address] [place] [address] Rec #:  [id number]       Accession #:      [id number]  Account #:      [id number]       Height:           61.8 in 157.0 cm  YOB: 1935       Weight:           156.5 lb 71.00 kg  Patient Age:    88 years       BSA:              1.72 m²  Patient Gender: F        BP:               0/0 mmHg      Date of Exam:        2023 10:51:26 AM  Sonographer:         JEREMIE  Referring Physician: MAN    Procedure:     2D Echo/Doppler/Color Doppler Complete.  Indications:   R06.00 - Dyspnea, unspecified  Diagnosis:  R06.00 - Dyspnea, unspecified  Study Details: Technically fair study.        2D AND M-MODE MEASUREMENTS (normal ranges within parentheses):  Left Ventricle:                  Normal   Aorta/Left Atrium:               Normal  IVSd (2D):              1.00 cm (0.7-1.1) Aortic Root (Mmode): 2.36 cm   (2.4-3.7)  LVPWd (2D):             0.93 cm (0.7-1.1) AoV Cusp Separation: 1.77 cm   (1.5-2.6)  LVIDd (2D):             4.71 cm (3.4-5.7) Left Atrium (Mmode): 4.92 cm   (1.9-4.0)  LVIDs (2D):3.01 cm  LV FS (2D):             36.1 %   (>25%)  LV EF (2D):              66 %    (>55%)  Relative Wall Thickness  0.39    (<0.42)    LV DIASTOLIC FUNCTION:  MV Peak E: 1.22 m/s Decel Time: 129 msec  MV Peak A: 0.34 m/s  E/A Ratio: 3.63    SPECTRAL DOPPLER ANALYSIS (where applicable):  Mitral Valve:  MV Max Cody: 0.75 m/s MV P1/2 Time: 37.43 msec                       MV Area, PHT: 5.88 cm²    Aortic Valve: AoV Max Cody: 1.10 m/s AoV Peak P.9 mmHg AoV Mean P.9 mmHg    LVOT Vmax: 0.53 m/sLVOT VTI: 0.116 m LVOT Diameter: 1.90 cm    AoV Area, Vmax: 1.35 cm² AoV Area, VTI: 1.31 cm² AoV Area, Vmn: 1.19 cm²  Ao VTI: 0.249  Tricuspid Valve and PA/RV Systolic Pressure: TR Max Velocity: 2.33 m/s RA   Pressure: 10 mmHg RVSP/PASP: 31.6 mmHg      PHYSICIAN INTERPRETATION:  Left Ventricle: The left ventricular internal cavity size is normal. Left   ventricular wall thickness is normal.  Global LV systolic function was normal. Left ventricular ejection   fraction, by visual estimation, is 55 to 60%. The left ventricular   diastolic function could not be assessed in this study.  Right Ventricle: The right ventricular size is mildly enlarged.  Left Atrium: Moderately enlarged left atrium.  Right Atrium: Moderately enlarged right atrium.  Pericardium: There is no evidence of pericardial effusion. There is a   moderate pleural effusion in both left and right lateral regions.  Mitral Valve: Thickening of the anterior and posterior mitral valve   leaflets. There is moderate mitral annular calcification. Mild mitral   valve regurgitation is seen.  Tricuspid Valve: The tricuspid valve is normal in structure.   Mild-moderate tricuspid regurgitation is visualized.  Aortic Valve: Sclerotic aortic valve with normal opening.  Pulmonic Valve: The pulmonic valve is normal. Mild pulmonic valve   regurgitation.  Aorta: The aortic root is normal in size and structure.      Summary:   1. Marked biatrial enlargement   2. Left ventricular ejection fraction, by visual estimation, is 55 to   60%.   3. Normal global left ventricular systolic function.   4. The left ventricular diastolic function could not be assessed in this   study.   5. Mildly enlarged right ventricle.   6. Moderate mitral annular calcification.   7. Mild mitral valve regurgitation.   8. Thickening of the anterior and posterior mitral valve leaflets.   9. Mild-moderate tricuspid regurgitation.  10. Mild pulmonic valve regurgitation.    [id number] Chapin Tenorio , Electronically signed on 2023   at 12:51:32 PM            *** Final ***    < end of copied text >      Care Discussed with Consultants/Other Providers:    Patient is a 88y old  Female who presents with a chief complaint of cough (2023 12:39)    Patient seen and examined at bedside.  no acute overnight events    ALLERGIES:  penicillin (Unknown)        Vital Signs Last 24 Hrs  T(F): 99 (2023 05:33), Max: 100 (2023 11:13)  HR: 97 (2023 05:33) (60 - 97)  BP: 124/71 (2023 05:33) (104/65 - 133/63)  RR: 18 (2023 05:33) (17 - 18)  SpO2: 93% (2023 05:33) (93% - 95%)  I&O's Summary    2023 07:01  -  2023 07:00  --------------------------------------------------------  IN: 0 mL / OUT: 1 mL / NET: -1 mL      MEDICATIONS:  acetaminophen     Tablet .. 650 milliGRAM(s) Oral every 6 hours PRN  acetaminophen     Tablet .. 650 milliGRAM(s) Oral every 6 hours PRN  albuterol    0.083% 2.5 milliGRAM(s) Nebulizer every 6 hours PRN  aluminum hydroxide/magnesium hydroxide/simethicone Suspension 30 milliLiter(s) Oral every 4 hours PRN  apixaban 2.5 milliGRAM(s) Oral two times a day  cyanocobalamin 1000 MICROGram(s) Oral daily  furosemide    Tablet 20 milliGRAM(s) Oral daily  guaifenesin/dextromethorphan Oral Liquid 10 milliLiter(s) Oral every 8 hours  melatonin 3 milliGRAM(s) Oral at bedtime PRN  metoprolol succinate ER 25 milliGRAM(s) Oral daily  multivitamin 1 Tablet(s) Oral daily  OLANZapine 2.5 milliGRAM(s) Oral at bedtime PRN  ondansetron Injectable 4 milliGRAM(s) IV Push every 8 hours PRN  potassium phosphate IVPB 15 milliMole(s) IV Intermittent once  traMADol 50 milliGRAM(s) Oral three times a day PRN      PHYSICAL EXAM:  General: NAD, Alert, elderly female  ENT: MMM, no thrush  Neck: Supple, No JVD  Lungs: Clear to auscultation bilaterally, non labored, good air entry  Cardio: IRRR, S1/S2, No murmurs  Abdomen: Soft, Nontender, Nondistended; Bowel sounds present  Extremities: No cyanosis, No edema    LABS:                        10.5   10.07 )-----------( 195      ( 2023 06:17 )             32.0     07-    137  |  102  |  14  ----------------------------<  113  3.5   |  25  |  0.66    Ca    8.9      2023 06:17  Phos  2.4     07-  Mg     1.9     07-    TPro  7.3  /  Alb  3.3  /  TBili  0.9  /  DBili  x   /  AST  19  /  ALT  12  /  AlkPhos  81  07-08      PT/INR - ( 2023 11:40 )   PT: 18.3 sec;   INR: 1.57 ratio         PTT - ( 2023 11:40 )  PTT:34.7 sec  Lactate, Blood: 0.6 mmol/L ( @ 11:40)                          Urinalysis Basic - ( 2023 06:17 )    Color: x / Appearance: x / SG: x / pH: x  Gluc: 113 mg/dL / Ketone: x  / Bili: x / Urobili: x   Blood: x / Protein: x / Nitrite: x   Leuk Esterase: x / RBC: x / WBC x   Sq Epi: x / Non Sq Epi: x / Bacteria: x            RADIOLOGY & ADDITIONAL TESTS:  < from: TTE Echo Complete w/o Contrast w/ Doppler (23 @ 10:51) >    ACC: [id number] EXAM:  ECHO TTE WO CON COMP W DOPP                          PROCEDURE DATE:  2023          INTERPRETATION:  TRANSTHORACIC ECHOCARDIOGRAM REPORT        Patient Name:   NERY HARRIS Patient Location: [address] [place] [address] Rec #:  [id number]       Accession #:      [id number]  Account #:      [id number]       Height:           61.8 in 157.0 cm  YOB: 1935       Weight:           156.5 lb 71.00 kg  Patient Age:    88 years       BSA:              1.72 m²  Patient Gender: F        BP:               0/0 mmHg      Date of Exam:        2023 10:51:26 AM  Sonographer:         JEREMIE  Referring Physician: MAN    Procedure:     2D Echo/Doppler/Color Doppler Complete.  Indications:   R06.00 - Dyspnea, unspecified  Diagnosis:  R06.00 - Dyspnea, unspecified  Study Details: Technically fair study.        2D AND M-MODE MEASUREMENTS (normal ranges within parentheses):  Left Ventricle:                  Normal   Aorta/Left Atrium:               Normal  IVSd (2D):              1.00 cm (0.7-1.1) Aortic Root (Mmode): 2.36 cm   (2.4-3.7)  LVPWd (2D):             0.93 cm (0.7-1.1) AoV Cusp Separation: 1.77 cm   (1.5-2.6)  LVIDd (2D):             4.71 cm (3.4-5.7) Left Atrium (Mmode): 4.92 cm   (1.9-4.0)  LVIDs (2D):3.01 cm  LV FS (2D):             36.1 %   (>25%)  LV EF (2D):              66 %    (>55%)  Relative Wall Thickness  0.39    (<0.42)    LV DIASTOLIC FUNCTION:  MV Peak E: 1.22 m/s Decel Time: 129 msec  MV Peak A: 0.34 m/s  E/A Ratio: 3.63    SPECTRAL DOPPLER ANALYSIS (where applicable):  Mitral Valve:  MV Max Cody: 0.75 m/s MV P1/2 Time: 37.43 msec                       MV Area, PHT: 5.88 cm²    Aortic Valve: AoV Max Cody: 1.10 m/s AoV Peak P.9 mmHg AoV Mean P.9 mmHg    LVOT Vmax: 0.53 m/sLVOT VTI: 0.116 m LVOT Diameter: 1.90 cm    AoV Area, Vmax: 1.35 cm² AoV Area, VTI: 1.31 cm² AoV Area, Vmn: 1.19 cm²  Ao VTI: 0.249  Tricuspid Valve and PA/RV Systolic Pressure: TR Max Velocity: 2.33 m/s RA   Pressure: 10 mmHg RVSP/PASP: 31.6 mmHg      PHYSICIAN INTERPRETATION:  Left Ventricle: The left ventricular internal cavity size is normal. Left   ventricular wall thickness is normal.  Global LV systolic function was normal. Left ventricular ejection   fraction, by visual estimation, is 55 to 60%. The left ventricular   diastolic function could not be assessed in this study.  Right Ventricle: The right ventricular size is mildly enlarged.  Left Atrium: Moderately enlarged left atrium.  Right Atrium: Moderately enlarged right atrium.  Pericardium: There is no evidence of pericardial effusion. There is a   moderate pleural effusion in both left and right lateral regions.  Mitral Valve: Thickening of the anterior and posterior mitral valve   leaflets. There is moderate mitral annular calcification. Mild mitral   valve regurgitation is seen.  Tricuspid Valve: The tricuspid valve is normal in structure.   Mild-moderate tricuspid regurgitation is visualized.  Aortic Valve: Sclerotic aortic valve with normal opening.  Pulmonic Valve: The pulmonic valve is normal. Mild pulmonic valve   regurgitation.  Aorta: The aortic root is normal in size and structure.      Summary:   1. Marked biatrial enlargement   2. Left ventricular ejection fraction, by visual estimation, is 55 to   60%.   3. Normal global left ventricular systolic function.   4. The left ventricular diastolic function could not be assessed in this   study.   5. Mildly enlarged right ventricle.   6. Moderate mitral annular calcification.   7. Mild mitral valve regurgitation.   8. Thickening of the anterior and posterior mitral valve leaflets.   9. Mild-moderate tricuspid regurgitation.  10. Mild pulmonic valve regurgitation.    [id number] Chapin Tenorio , Electronically signed on 2023   at 12:51:32 PM            *** Final ***    < end of copied text >      Care Discussed with Consultants/Other Providers:

## 2023-07-09 NOTE — DISCHARGE NOTE PROVIDER - CARE PROVIDER_API CALL
[place] [address]  Phone: [phone]  Fax: [phone]  Follow Up Time:    [place] [address]  Phone: [phone]  Fax: [phone]  Follow Up Time:    [place] [address]  Phone: [phone]  Fax: [phone]  Follow Up Time:

## 2023-07-09 NOTE — DISCHARGE NOTE PROVIDER - NSDCMRMEDTOKEN_GEN_ALL_CORE_FT
cyanocobalamin 1000 mcg oral tablet: 1 tab(s) orally once a day  Eliquis 2.5 mg oral tablet: 1 tab(s) orally 2 times a day  Lasix 20 mg oral tablet: 1 tab(s) orally once a day  Multiple Vitamins oral tablet: 1 tab(s) orally once a day  Toprol-XL 25 mg oral tablet, extended release: 1 tab(s) orally once a day  traMADol 50 mg oral tablet: 1 tab(s) orally 3 times a day 1 tab 3x a day  Tylenol 325 mg oral tablet: 2 tab(s) orally every 8 hours as needed for  mild pain   acetaminophen 325 mg oral tablet: 2 tab(s) orally every 6 hours As needed Temp greater or equal to 38C (100.4F)  cyanocobalamin 1000 mcg oral tablet: 1 tab(s) orally once a day  Eliquis 2.5 mg oral tablet: 1 tab(s) orally 2 times a day  Lasix 20 mg oral tablet: 1 tab(s) orally once a day  Multiple Vitamins oral tablet: 1 tab(s) orally once a day  Toprol-XL 25 mg oral tablet, extended release: 1 tab(s) orally once a day  traMADol 50 mg oral tablet: 1 tab(s) orally 3 times a day 1 tab 3x a day  Tylenol 325 mg oral tablet: 2 tab(s) orally every 8 hours as needed for  mild pain

## 2023-07-09 NOTE — PROGRESS NOTE ADULT - ASSESSMENT
88 -year-old female with past medical history of A-fib on Eliquis, CHF on Lasix, dementia was brought in by EMS from [facility] assisted living for fever since yesterday and cough for a few days.    Acute Entero Rhino Virus  T max 100 F and SpO2 lowest 91% in ED  RVP positive for Enterorhinovirus, PCT low, CXR negative  Monitor off antibiotics, suspect viral etiology  Supportive care, stable respiratory status, Robitussin Q 8  Maintain proper precautions  Anticipate discharge when fever free for at least 24 hours    Chronic CHFpEF  pro bnp 1268, at patient's baseline  appears clinically euvolemic  continue lasix 20 daily, toprol  follow up ECHO, monitor fluid status    HTN  chronic condition  continue toprol 25    Atrial Fibrillation  rate controlled with toprol  continue eliqius, follow ECHO    DVT PPx  eliquis    Code Status  confirmed DNR/DNI    patient's daughter maged iraheta     88 -year-old female with past medical history of A-fib on Eliquis, CHF on Lasix, dementia was brought in by EMS from [facility] assisted living for fever since yesterday and cough for a few days.    Acute Entero Rhino Virus  T max 100 F and SpO2 lowest 91% in ED  RVP positive for Enterorhinovirus, PCT low, CXR negative  Monitor off antibiotics, suspect viral etiology  Supportive care, stable respiratory status, Robitussin Q 8  Maintain proper precautions  Anticipate discharge when fever free for at least 24 hours    Chronic CHFpEF  pro bnp 1268, at patient's baseline  appears clinically euvolemic  continue lasix 20 daily, toprol  follow up ECHO, monitor fluid status    HTN  chronic condition  continue toprol 25    Atrial Fibrillation  rate controlled with toprol  continue eliqius, follow ECHO    DVT PPx  eliquis    Code Status  confirmed DNR/DNI    patient's daughter maged iraheta     88 -year-old female with past medical history of A-fib on Eliquis, CHF on Lasix, dementia was brought in by EMS from [facility] assisted living for fever since yesterday and cough for a few days.    Acute Entero Rhino Virus  T max 100 F and SpO2 lowest 91% in ED  RVP positive for Enterorhinovirus, PCT low, CXR negative  Monitor off antibiotics, suspect viral etiology  Supportive care, stable respiratory status, Robitussin Q 8  Maintain proper precautions  Anticipate discharge when fever free for at least 24 hours    Chronic CHFpEF  pro bnp 1268, at patient's baseline  appears clinically euvolemic  continue lasix 20 daily, toprol  follow up ECHO, monitor fluid status    HTN  chronic condition  continue toprol 25    Atrial Fibrillation  rate controlled with toprol  continue eliqius, follow ECHO    DVT PPx  eliquis    Code Status  confirmed DNR/DNI    patient's daughter maged iraheta     88 -year-old female with past medical history of A-fib on Eliquis, CHF on Lasix, dementia was brought in by EMS from [facility] assisted living for fever since yesterday and cough for a few days.    Acute Entero Rhino Virus  T max 100 F and SpO2 lowest 91% in ED  RVP positive for Enterorhinovirus, PCT low, CXR negative  Monitor off antibiotics, suspect viral etiology  Supportive care, stable respiratory status, Robitussin Q 8  Maintain proper precautions    Leukocytosis  Likely due to above  Improved  Follow up AM CBC    Chronic CHFpEF  pro bnp 1268, at patient's baseline  appears clinically euvolemic  continue lasix 20 daily, toprol  follow up ECHO, monitor fluid status    HTN  chronic condition  continue toprol 25  monitor vitals    Atrial Fibrillation  rate controlled with toprol  continue eliqius for AC  TTE performed    Hyponatremia  Improved after IVF  Continue PO hydration  Follow up AM BMP    DVT PPx  eliquis    Code Status  confirmed DNR/DNI    patient's daughter maged myrtle     88 -year-old female with past medical history of A-fib on Eliquis, CHF on Lasix, dementia was brought in by EMS from [facility] assisted living for fever since yesterday and cough for a few days.    Acute Entero Rhino Virus  T max 100 F and SpO2 lowest 91% in ED  RVP positive for Enterorhinovirus, PCT low, CXR negative  Monitor off antibiotics, suspect viral etiology  Supportive care, stable respiratory status, Robitussin Q 8  Maintain proper precautions    Leukocytosis  Likely due to above  Improved  Follow up AM CBC    Chronic CHFpEF  pro bnp 1268, at patient's baseline  appears clinically euvolemic  continue lasix 20 daily, toprol  follow up ECHO, monitor fluid status    HTN  chronic condition  continue toprol 25  monitor vitals    Atrial Fibrillation  rate controlled with toprol  continue eliqius for AC  TTE performed    Hyponatremia  Improved after IVF  Continue PO hydration  Follow up AM BMP    DVT PPx  eliquis    Code Status  confirmed DNR/DNI    patient's daughter maged myrtle     88 -year-old female with past medical history of A-fib on Eliquis, CHF on Lasix, dementia was brought in by EMS from [facility] assisted living for fever since yesterday and cough for a few days.    Acute Entero Rhino Virus  T max 100 F and SpO2 lowest 91% in ED  RVP positive for Enterorhinovirus, PCT low, CXR negative  Monitor off antibiotics, suspect viral etiology  Supportive care, stable respiratory status, Robitussin Q 8  Maintain proper precautions    Leukocytosis  Likely due to above  Improved  Follow up AM CBC    Chronic CHFpEF  pro bnp 1268, at patient's baseline  appears clinically euvolemic  continue lasix 20 daily, toprol  follow up ECHO, monitor fluid status    HTN  chronic condition  continue toprol 25  monitor vitals    Atrial Fibrillation  rate controlled with toprol  continue eliqius for AC  TTE performed    Hyponatremia  Improved after IVF  Continue PO hydration  Follow up AM BMP    DVT PPx  eliquis    Code Status  confirmed DNR/DNI    patient's daughter maged myrtle

## 2023-07-09 NOTE — DISCHARGE NOTE PROVIDER - HOSPITAL COURSE
Hospital Course  88 -year-old female with past medical history of A-fib on Eliquis CHF on Lasix, dementia was brought in by EMS from [facility] assisted living for fever since yesterday and cough for a few days. Pt reports chest congestion and non-productive cough. denies SOB, chest pain, abd pain, nausea, vomiting, diarrhea, leg pain, leg swelling.    In ED, T max 100, HR 60, /60, SpO2 95% on RA    Patient from [facility] admitted on 7/8 with fever found with acute Entero Rhino Virus infection.  CXR negative, low suspicion for lung infection.  Stable respiratory status, pt treated symptomatically.      Source of Infection:  Antibiotic / Last Day: Na    Palliative Care / Advanced Care Planning  Code Status:  Patient/Family agreeable to Hospice/Palliative (Y/N)?  Summary of Goals of Care Conversation:    Discharging Provider:  Luis A Titus NP  Contact Info: Cell [phone] - Please call with any questions or concerns.    Outpatient Provider: Dr. Cas Dinh           Hospital Course  88 -year-old female with past medical history of A-fib on Eliquis CHF on Lasix, dementia was brought in by EMS from [facility] for fever since yesterday and cough for a few days. Pt reports chest congestion and non-productive cough. denies SOB, chest pain, abd pain, nausea, vomiting, diarrhea, leg pain, leg swelling.    In ED, T max 100, HR 60, /60, SpO2 95% on RA    Patient from [facility] admitted on 7/8 with fever found with acute Entero Rhino Virus infection.  CXR negative, low suspicion for lung infection.  Stable respiratory status, pt treated symptomatically.      Source of Infection:  Antibiotic / Last Day: Na    Palliative Care / Advanced Care Planning  Code Status:  Patient/Family agreeable to Hospice/Palliative (Y/N)?  Summary of Goals of Care Conversation:    Discharging Provider:  Luis A Titus NP  Contact Info: Cell [phone] - Please call with any questions or concerns.    Outpatient Provider: Dr. Cas Dinh           Hospital Course  88 -year-old female with past medical history of A-fib on Eliquis CHF on Lasix, dementia was brought in by EMS from [facility] assisted [facility] for fever since yesterday and cough for a few days. Pt reports chest congestion and non-productive cough. denies SOB, chest pain, abd pain, nausea, vomiting, diarrhea, leg pain, leg swelling.    In ED, T max 100, HR 60, /60, SpO2 95% on RA    Patient from [facility] admitted on 7/8 with fever found with acute Entero Rhino Virus infection.  CXR negative, low suspicion for lung infection.  Stable respiratory status, pt treated symptomatically.      Source of Infection:  Antibiotic / Last Day: Na    Palliative Care / Advanced Care Planning  Code Status:  Patient/Family agreeable to Hospice/Palliative (Y/N)?  Summary of Goals of Care Conversation:    Discharging Provider:  Luis A Titus NP  Contact Info: Cell [phone] - Please call with any questions or concerns.    Outpatient Provider: Dr. Cas Dinh           Hospital Course  88 -year-old female with past medical history of A-fib on Eliquis CHF on Lasix, dementia was brought in by EMS from [facility] assisted living for fever since yesterday and cough for a few days. Pt reports chest congestion and non-productive cough. denies SOB, chest pain, abd pain, nausea, vomiting, diarrhea, leg pain, leg swelling.    In ED, T max 100, HR 60, /60, SpO2 95% on RA    Patient from [facility] admitted on 7/8 with fever found with acute Entero Rhino Virus infection.  CXR negative, low suspicion for lung infection.  Stable respiratory status, pt treated symptomatically. Medically cleared to return to Assisted living facility.     Source of Infection:  Antibiotic / Last Day: Na    Palliative Care / Advanced Care Planning  Code Status: full code  Patient/Family agreeable to Hospice/Palliative (Y/N)?  Summary of Goals of Care Conversation:    Discharging Provider:  Luis A Titus NP  Contact Info: Cell [phone] - Please call with any questions or concerns.    Outpatient Provider: Dr. Cas Dinh           Hospital Course  88 -year-old female with past medical history of A-fib on Eliquis CHF on Lasix, dementia was brought in by EMS from [facility] assisted living for fever since yesterday and cough for a few days. Pt reports chest congestion and non-productive cough. denies SOB, chest pain, abd pain, nausea, vomiting, diarrhea, leg pain, leg swelling.    In ED, T max 100, HR 60, /60, SpO2 95% on RA    Patient from [facility] admitted on 7/8 with fever found with acute Entero Rhino Virus infection.  CXR negative, low suspicion for lung infection.  Stable respiratory status, pt treated symptomatically. Medically cleared to return to Assisted living facility.     Source of Infection:  Antibiotic / Last Day: Na    Palliative Care / Advanced Care Planning  Code Status: full code  Patient/Family agreeable to Hospice/Palliative (Y/N)?  Summary of Goals of Care Conversation:    Discharging Provider:  Luis A Titus NP  Contact Info: Cell [phone] - Please call with any questions or concerns.    Outpatient Provider: Dr. Cas Dinh           Hospital Course  88 -year-old female with past medical history of A-fib on Eliquis CHF on Lasix, dementia was brought in by EMS from [facility] assisted living for fever since yesterday and cough for a few days. Pt reports chest congestion and non-productive cough. denies SOB, chest pain, abd pain, nausea, vomiting, diarrhea, leg pain, leg swelling.    In ED, T max 100, HR 60, /60, SpO2 95% on RA    Patient from [facility] admitted on 7/8 with fever found with acute Entero Rhino Virus infection.  CXR negative, low suspicion for lung infection.  Stable respiratory status, pt treated symptomatically. Medically cleared to return to Assisted living facility.     Source of Infection:  Antibiotic / Last Day: Na    Palliative Care / Advanced Care Planning  Code Status: full code  Patient/Family agreeable to Hospice/Palliative (Y/N)?  Summary of Goals of Care Conversation:    Discharging Provider:  Luis A Titus NP  Contact Info: Cell [phone] - Please call with any questions or concerns.    Outpatient Provider: Dr. Cas Dinh           Hospital Course  88 -year-old female with past medical history of A-fib on Eliquis CHF on Lasix, dementia was brought in by EMS from [facility] assisted living for fever since yesterday and cough for a few days. Pt reports chest congestion and non-productive cough. denies SOB, chest pain, abd pain, nausea, vomiting, diarrhea, leg pain, leg swelling.    In ED, T max 100, HR 60, /60, SpO2 95% on RA    Patient from [facility] admitted on 7/8 with fever found with acute Entero Rhino Virus infection.  CXR negative. Stable respiratory status, pt treated symptomatically. Medically cleared to return to Assisted living facility. Afebrile.    Discharging Provider:  Lusi A Titus NP  Contact Info: cell [phone] - Please call with any questions or concerns.    Outpatient Provider: Dr. Cas Dinh           Hospital Course  88 -year-old female with past medical history of A-fib on Eliquis CHF on Lasix, dementia was brought in by EMS from [facility] assisted living for fever since yesterday and cough for a few days. Pt reports chest congestion and non-productive cough. denies SOB, chest pain, abd pain, nausea, vomiting, diarrhea, leg pain, leg swelling.    In ED, T max 100, HR 60, /60, SpO2 95% on RA    Patient from [facility] admitted on 7/8 with fever found with acute Entero Rhino Virus infection.  CXR negative. Stable respiratory status, pt treated symptomatically. Medically cleared to return to Assisted living facility. Afebrile.    Discharging Provider:  Luis A Titus NP  Contact Info: cell [phone] - Please call with any questions or concerns.    Outpatient Provider: Dr. Cas Dinh           Hospital Course  88 -year-old female with past medical history of A-fib on Eliquis CHF on Lasix, dementia was brought in by EMS from [facility] assisted living for fever since yesterday and cough for a few days. Pt reports chest congestion and non-productive cough. denies SOB, chest pain, abd pain, nausea, vomiting, diarrhea, leg pain, leg swelling.    In ED, T max 100, HR 60, /60, SpO2 95% on RA    Patient from [facility] admitted on 7/8 with fever found with acute Entero Rhino Virus infection.  CXR negative. Stable respiratory status, pt treated symptomatically. Medically cleared to return to Assisted living facility. Afebrile.    Discharging Provider:  Luis A Titus NP  Contact Info: cell [phone] - Please call with any questions or concerns.    Outpatient Provider: Dr. Cas Dinh

## 2023-07-09 NOTE — DISCHARGE NOTE PROVIDER - PROVIDER TOKENS
PROVIDER:[TOKEN:[[id number]]] PROVIDER:[TOKEN:[[id number]]] PROVIDER:[TOKEN:[[id number]]]

## 2023-07-09 NOTE — DISCHARGE NOTE PROVIDER - NSDCCPCAREPLAN_GEN_ALL_CORE_FT
PRINCIPAL DISCHARGE DIAGNOSIS  Diagnosis: Viral infection  Assessment and Plan of Treatment:      PRINCIPAL DISCHARGE DIAGNOSIS  Diagnosis: Viral infection  Assessment and Plan of Treatment: You were admitted to the hospital with a fever and cough.   -You tested positive for enterovirus / rhinovirus - which is similar to the "common cold"  -Stay hydrated  -Take Tylenol as needed for fever and pain  -Continue your home medications  -Follow up with your primary care physician within the week

## 2023-07-09 NOTE — PROGRESS NOTE ADULT - NS ATTEND AMEND GEN_ALL_CORE FT
88F with PMH chronic a fib (on Eliquis) diastolic CHF, dementia brought in by EMS from [facility] assisted living for fever and cough. Admitted for rhinovirus / enterovirus. Continue supportive care. SPo2 appropriate on RA, continue to monitor respiratory status. Tylenol PRN for fever. 88F with PMH chronic a fib (on Eliquis) diastolic CHF, dementia brought in by EMS from [facility] assisted living for fever and cough. Admitted for rhinovirus / enterovirus. Continue supportive care. SPo2 appropriate on RA, continue to monitor respiratory status. Tylenol PRN for fever. 88F with PMH chronic a fib (on Eliquis) diastolic CHF, dementia brought in by EMS from [facility] assisted living for fever and cough. Admitted for rhinovirus / enterovirus. Continue supportive care. SPo2 appropriate on RA, continue to monitor respiratory status. Tylenol PRN for fever.

## 2023-07-10 ENCOUNTER — TRANSCRIPTION ENCOUNTER (OUTPATIENT)
Age: 88
End: 2023-07-10

## 2023-07-10 VITALS — SYSTOLIC BLOOD PRESSURE: 112 MMHG | DIASTOLIC BLOOD PRESSURE: 50 MMHG | HEART RATE: 68 BPM | OXYGEN SATURATION: 97 %

## 2023-07-10 PROCEDURE — 87040 BLOOD CULTURE FOR BACTERIA: CPT

## 2023-07-10 PROCEDURE — 83605 ASSAY OF LACTIC ACID: CPT

## 2023-07-10 PROCEDURE — 83880 ASSAY OF NATRIURETIC PEPTIDE: CPT

## 2023-07-10 PROCEDURE — 80053 COMPREHEN METABOLIC PANEL: CPT

## 2023-07-10 PROCEDURE — 85610 PROTHROMBIN TIME: CPT

## 2023-07-10 PROCEDURE — 97162 PT EVAL MOD COMPLEX 30 MIN: CPT

## 2023-07-10 PROCEDURE — 85730 THROMBOPLASTIN TIME PARTIAL: CPT

## 2023-07-10 PROCEDURE — 99239 HOSP IP/OBS DSCHRG MGMT >30: CPT

## 2023-07-10 PROCEDURE — 0225U NFCT DS DNA&RNA 21 SARSCOV2: CPT

## 2023-07-10 PROCEDURE — 84145 PROCALCITONIN (PCT): CPT

## 2023-07-10 PROCEDURE — 80048 BASIC METABOLIC PNL TOTAL CA: CPT

## 2023-07-10 PROCEDURE — 83735 ASSAY OF MAGNESIUM: CPT

## 2023-07-10 PROCEDURE — 71045 X-RAY EXAM CHEST 1 VIEW: CPT

## 2023-07-10 PROCEDURE — 99285 EMERGENCY DEPT VISIT HI MDM: CPT

## 2023-07-10 PROCEDURE — 93306 TTE W/DOPPLER COMPLETE: CPT

## 2023-07-10 PROCEDURE — 93005 ELECTROCARDIOGRAM TRACING: CPT

## 2023-07-10 PROCEDURE — 85025 COMPLETE CBC W/AUTO DIFF WBC: CPT

## 2023-07-10 PROCEDURE — 84100 ASSAY OF PHOSPHORUS: CPT

## 2023-07-10 PROCEDURE — 36415 COLL VENOUS BLD VENIPUNCTURE: CPT

## 2023-07-10 RX ADMIN — Medication 20 MILLIGRAM(S): at 05:56

## 2023-07-10 RX ADMIN — Medication 3 MILLIGRAM(S): at 01:27

## 2023-07-10 RX ADMIN — Medication 25 MILLIGRAM(S): at 05:56

## 2023-07-10 RX ADMIN — Medication 1 TABLET(S): at 11:24

## 2023-07-10 RX ADMIN — APIXABAN 2.5 MILLIGRAM(S): 2.5 TABLET, FILM COATED ORAL at 05:56

## 2023-07-10 RX ADMIN — PREGABALIN 1000 MICROGRAM(S): 225 CAPSULE ORAL at 11:24

## 2023-07-10 RX ADMIN — TRAMADOL HYDROCHLORIDE 50 MILLIGRAM(S): 50 TABLET ORAL at 05:56

## 2023-07-10 RX ADMIN — ONDANSETRON 4 MILLIGRAM(S): 8 TABLET, FILM COATED ORAL at 12:47

## 2023-07-10 RX ADMIN — Medication 10 MILLILITER(S): at 05:56

## 2023-07-10 NOTE — DISCHARGE NOTE NURSING/CASE MANAGEMENT/SOCIAL WORK - NSDCPEFALRISK_GEN_ALL_CORE
For information on Fall & Injury Prevention, visit: https://www.[facility]/news/fall-prevention-protects-and-maintains-health-and-mobility OR  https://www.[facility]/news/fall-prevention-tips-to-avoid-injury OR  https://www.cdc.gov/steadi/patient.html For information on Fall & Injury Prevention, visit: https://www.[facility]/news/fall-prevention-protects-and-maintains-health-and-mobility OR  https://www.[facility]/news/fall-prevention-tips-to-avoid-injury OR  https://www.cdc.gov/steadi/patient.html For information on Fall & Injury Prevention, visit: https://www.[facility]/news/fall-prevention-protects-and-maintains-health-and-mobility OR  https://www.[facility]/news/fall-prevention-tips-to-avoid-injury OR  https://www.cdc.gov/steadi/patient.html

## 2023-07-10 NOTE — PHYSICAL THERAPY INITIAL EVALUATION ADULT - PERTINENT HX OF CURRENT PROBLEM, REHAB EVAL
pt is an 88 yr old female with PMH dementia, chronic a fib (on Eliquis), diastolic CHF brought in by EMS from [facility] for fever and cough for a few days. pt is an 88 yr old female with PMH dementia, chronic a fib (on Eliquis), diastolic CHF brought in by EMS from [facility] for fever and cough for a few days. pt is an 88 yr old female with PMH dementia, chronic a fib (on Eliquis), diastolic CHF brought in by EMS from [facility] for fever and cough for a few days.

## 2023-07-10 NOTE — PHYSICAL THERAPY INITIAL EVALUATION ADULT - ADDITIONAL COMMENTS
pt resides at [facility], per daughter the patient ambulates with RW and supervision and receives assist for ADLs pt resides at [facility], per daughter the patient ambulates with RW and supervision and receives assist for ADLs pt resides at [facility], per daughter the patient ambulates with RW and supervision and receives assist for ADLs

## 2023-07-10 NOTE — PROGRESS NOTE ADULT - ASSESSMENT
88 -year-old female with past medical history of A-fib on Eliquis, CHF on Lasix, dementia was brought in by EMS from [facility] assisted living for fever since yesterday and cough for a few days.    Acute Entero Rhino Virus  T max 100 F and SpO2 lowest 91% in ED  RVP positive for Enterorhinovirus, PCT low, CXR negative  Monitor off antibiotics, suspect viral etiology  Supportive care, stable respiratory status, Robitussin Q 8  Maintain proper precautions    Leukocytosis  Likely due to above  Improved  Follow up AM CBC    Chronic CHFpEF  pro bnp 1268, at patient's baseline  appears clinically euvolemic  continue lasix 20 daily, toprol  follow up ECHO, monitor fluid status    HTN  chronic condition  continue toprol 25  monitor vitals    Atrial Fibrillation  rate controlled with toprol  continue eliqius for AC  TTE performed    Hyponatremia  Improved after IVF  Continue PO hydration  Follow up AM BMP    DVT PPx  eliquis    Code Status  confirmed DNR/DNI    patient's daughter maged myrtle     88 -year-old female with past medical history of A-fib on Eliquis, CHF on Lasix, dementia was brought in by EMS from [facility] assisted living for fever since yesterday and cough for a few days.    Acute Entero Rhino Virus  T max 100 F and SpO2 lowest 91% in ED  RVP positive for Enterorhinovirus, PCT low, CXR negative  Monitor off antibiotics, suspect viral etiology  Supportive care, stable respiratory status, Robitussin Q 8  Maintain proper precautions    Leukocytosis  Likely due to above  Improved  Follow up AM CBC    Chronic CHFpEF  pro bnp 1268, at patient's baseline  appears clinically euvolemic  continue lasix 20 daily, toprol  follow up ECHO, monitor fluid status    HTN  chronic condition  continue toprol 25  monitor vitals    Atrial Fibrillation  rate controlled with toprol  continue eliqius for AC  TTE performed    Hyponatremia  Improved after IVF  Continue PO hydration  Follow up AM BMP    DVT PPx  eliquis    Code Status  confirmed DNR/DNI    patient's daughter maged myrtle     88 -year-old female with past medical history of A-fib on Eliquis, CHF on Lasix, dementia was brought in by EMS from [facility] assisted living for fever since yesterday and cough for a few days.    Acute Entero Rhino Virus  T max 100 F and SpO2 lowest 91% in ED  RVP positive for Enterorhinovirus, PCT low, CXR negative  Monitor off antibiotics, suspect viral etiology  Supportive care, stable respiratory status, Robitussin Q 8  Maintain proper precautions    Leukocytosis  Likely due to above  Improved  Follow up AM CBC    Chronic CHFpEF  pro bnp 1268, at patient's baseline  appears clinically euvolemic  continue lasix 20 daily, toprol  follow up ECHO, monitor fluid status    HTN  chronic condition  continue toprol 25  monitor vitals    Atrial Fibrillation  rate controlled with toprol  continue eliqius for AC  TTE performed    Hyponatremia  Improved after IVF  Continue PO hydration  Follow up AM BMP    DVT PPx  eliquis    Code Status  confirmed DNR/DNI    patient's daughter maged myrtle     88F with PMH dementia, chronic a fib (on Eliquis), diastolic CHF brought in by EMS from [facility] assisted living for fever and cough for a few days.    Acute Entero Rhino Virus  T max 100 F and SpO2 lowest 91% in ED  RVP positive for Enterorhinovirus, PCT low, CXR negative  Monitor off antibiotics, suspect viral etiology  Supportive care, stable respiratory status, Robitussin Q 8  Maintain proper precautions    Leukocytosis  Likely due to above  Improved  Follow up AM CBC    Chronic CHFpEF  pro bnp 1268, at patient's baseline  appears clinically euvolemic  continue lasix 20 daily, toprol  follow up ECHO, monitor fluid status    HTN  chronic condition  continue toprol 25  monitor vitals    Atrial Fibrillation  rate controlled with toprol  continue eliqius for AC  TTE performed    Hyponatremia  Improved after IVF  Continue PO hydration  Follow up AM BMP    DVT PPx  eliquis    Code Status  confirmed DNR/DNI    patient's daughter Silvia myrtle     88F with PMH dementia, chronic a fib (on Eliquis), diastolic CHF brought in by EMS from [facility] assisted living for fever and cough for a few days.    Acute Entero Rhino Virus  T max 100 F and SpO2 lowest 91% in ED  RVP positive for Enterorhinovirus, PCT low, CXR negative  Monitor off antibiotics, suspect viral etiology  Supportive care, stable respiratory status, Robitussin Q 8  Maintain proper precautions    Leukocytosis  Likely due to above  Improved  Follow up AM CBC    Chronic CHFpEF  pro bnp 1268, at patient's baseline  appears clinically euvolemic  continue lasix 20 daily, toprol  follow up ECHO, monitor fluid status    HTN  chronic condition  continue toprol 25  monitor vitals    Atrial Fibrillation  rate controlled with toprol  continue eliqius for AC  TTE performed    Hyponatremia  Improved after IVF  Continue PO hydration  Follow up AM BMP    DVT PPx  eliquis    Code Status  confirmed DNR/DNI    patient's daughter Silvia myrtle     88F with PMH dementia, chronic a fib (on Eliquis), diastolic CHF brought in by EMS from [facility] assisted living for fever and cough for a few days.    Acute Entero Rhino Virus  T max 100 F and SpO2 lowest 91% in ED  RVP positive for Enterorhinovirus, PCT low, CXR negative  Monitor off antibiotics, suspect viral etiology  Supportive care, stable respiratory status, Robitussin Q 8  Maintain proper precautions    Leukocytosis  Likely due to above  Improved  Follow up AM CBC    Chronic CHFpEF  pro bnp 1268, at patient's baseline  appears clinically euvolemic  continue lasix 20 daily, toprol  follow up ECHO, monitor fluid status    HTN  chronic condition  continue toprol 25  monitor vitals    Atrial Fibrillation  rate controlled with toprol  continue eliqius for AC  TTE performed    Hyponatremia  Improved after IVF  Continue PO hydration  Follow up AM BMP    DVT PPx  eliquis    Code Status  confirmed DNR/DNI    patient's daughter Silvia myrtle     88F with PMH dementia, chronic a fib (on Eliquis), diastolic CHF brought in by EMS from [facility] assisted living for fever and cough for a few days.    Acute Entero Rhino Virus  RVP positive for Enterorhinovirus, PCT low, CXR negative  Monitor off antibiotics, suspect viral etiology  Supportive care, stable respiratory status, Robitussin Q 8  Maintain proper precautions    Leukocytosis  Likely due to above  Improved  Follow up AM CBC    Chronic CHFpEF  pro bnp 1268, at patient's baseline  appears clinically euvolemic  continue lasix 20 daily, toprol  TTE performed, monitor fluid status    HTN  chronic condition  continue toprol 25  monitor vitals    Atrial Fibrillation  rate controlled with toprol  continue eliqius for AC  TTE performed    Hyponatremia  Improved after IVF  Continue PO hydration  Follow up AM BMP    DVT PPx  eliquis    Code Status  confirmed DNR/DNI    patient's daughter Silvia updated - stable for dc home today with close outpatient follow up     88F with PMH dementia, chronic a fib (on Eliquis), diastolic CHF brought in by EMS from [facility] assisted living for fever and cough for a few days.    Acute Entero Rhino Virus  RVP positive for Enterorhinovirus, PCT low, CXR negative  Monitor off antibiotics, suspect viral etiology  Supportive care, stable respiratory status, Robitussin Q 8  Maintain proper precautions    Leukocytosis  Likely due to above  Improved  Follow up AM CBC    Chronic CHFpEF  pro bnp 1268, at patient's baseline  appears clinically euvolemic  continue lasix 20 daily, toprol  TTE performed, monitor fluid status    HTN  chronic condition  continue toprol 25  monitor vitals    Atrial Fibrillation  rate controlled with toprol  continue eliqius for AC  TTE performed    Hyponatremia  Improved after IVF  Continue PO hydration  Follow up AM BMP    DVT PPx  eliquis    Code Status  confirmed DNR/DNI    patient's daughter Silvia updated - stable for dc home today with close outpatient follow up     88F with PMH dementia, chronic a fib (on Eliquis), diastolic CHF brought in by EMS from [facility] assisted living for fever and cough for a few days.    Acute Entero Rhino Virus  RVP positive for Enterorhinovirus, PCT low, CXR negative  Monitor off antibiotics, suspect viral etiology  Supportive care, stable respiratory status, Robitussin Q 8  Maintain proper precautions    Leukocytosis  Likely due to above  Improved  Follow up AM CBC    Chronic CHFpEF  pro bnp 1268, at patient's baseline  appears clinically euvolemic  continue lasix 20 daily, toprol  TTE performed, monitor fluid status    HTN  chronic condition  continue toprol 25  monitor vitals    Atrial Fibrillation  rate controlled with toprol  continue eliqius for AC  TTE performed    Hyponatremia  Improved after IVF  Continue PO hydration  Follow up AM BMP    DVT PPx  eliquis    Code Status  confirmed DNR/DNI    patient's daughter Silvia updated - stable for dc home today with close outpatient follow up

## 2023-07-10 NOTE — DISCHARGE NOTE NURSING/CASE MANAGEMENT/SOCIAL WORK - PATIENT PORTAL LINK FT
You can access the FollowMyHealth Patient Portal offered by [facility] by registering at the following website: [email or web address]. By joining [facility]’s FollowMyHealth portal, you will also be able to view your health information using other applications (apps) compatible with our system. You can access the FollowMyHealth Patient Portal offered by [facility] by registering at the following website: [email or web address]. By joining [facility]’s FollowMyHealth portal, you will also be able to view your health information using other applications (apps) compatible with our system. You can access the FollowMyHealth Patient Portal offered by [facility] by registering at the following website: [email or web address]. By joining [facility]’s FollowMyHealth portal, you will also be able to view your health information using other applications (apps) compatible with our system.

## 2023-07-10 NOTE — PROGRESS NOTE ADULT - NS ATTEND AMEND GEN_ALL_CORE FT
88F with PMH chronic a fib (on Eliquis), chronic diastolic CHF, dementia brought in by EMS from [facility] assisted living for fever and cough. Admitted for rhinovirus / enterovirus. Continue supportive care. SPo2 appropriate on RA, continue to monitor respiratory status. No fevers overnight. Discussed with daughter Silvia [phone] aware and in agreement with above, dc home she will  patient. 88F with PMH chronic a fib (on Eliquis), chronic diastolic CHF, dementia brought in by EMS from [facility] assisted living for fever and cough. Admitted for rhinovirus / enterovirus. Continue supportive care. SPo2 appropriate on RA, continue to monitor respiratory status. No fevers overnight. Discussed with daughter Silvia [phone] aware and in agreement with above, dc home she will  patient. 88F with PMH chronic a fib (on Eliquis), chronic diastolic CHF, dementia brought in by EMS from [facility] assisted living for fever and cough. Admitted for rhinovirus / enterovirus. Continue supportive care. SPo2 appropriate on RA, continue to monitor respiratory status. No fevers overnight. Discussed with daughter Silvia [phone] aware and in agreement with above, dc home she will  patient.

## 2023-07-10 NOTE — PROGRESS NOTE ADULT - SUBJECTIVE AND OBJECTIVE BOX
Patient is a 88y old  Female who presents with a chief complaint of cough (09 Jul 2023 13:18)      Patient seen and examined at bedside.    ALLERGIES:  penicillin (Unknown)    MEDICATIONS  (STANDING):  apixaban 2.5 milliGRAM(s) Oral two times a day  cyanocobalamin 1000 MICROGram(s) Oral daily  furosemide    Tablet 20 milliGRAM(s) Oral daily  guaifenesin/dextromethorphan Oral Liquid 10 milliLiter(s) Oral every 8 hours  metoprolol succinate ER 25 milliGRAM(s) Oral daily  multivitamin 1 Tablet(s) Oral daily  traMADol 50 milliGRAM(s) Oral three times a day    MEDICATIONS  (PRN):  acetaminophen     Tablet .. 650 milliGRAM(s) Oral every 6 hours PRN Mild Pain (1 - 3)  acetaminophen     Tablet .. 650 milliGRAM(s) Oral every 6 hours PRN Temp greater or equal to 38C (100.4F)  albuterol    0.083% 2.5 milliGRAM(s) Nebulizer every 6 hours PRN Shortness of Breath and/or Wheezing  aluminum hydroxide/magnesium hydroxide/simethicone Suspension 30 milliLiter(s) Oral every 4 hours PRN Dyspepsia  melatonin 3 milliGRAM(s) Oral at bedtime PRN Insomnia  OLANZapine 2.5 milliGRAM(s) Oral at bedtime PRN agitations  ondansetron Injectable 4 milliGRAM(s) IV Push every 8 hours PRN Nausea and/or Vomiting    Vital Signs Last 24 Hrs  T(F): 97.8 (10 Jul 2023 05:55), Max: 98.2 (09 Jul 2023 15:30)  HR: 85 (10 Jul 2023 05:55) (85 - 105)  BP: 122/65 (10 Jul 2023 05:55) (122/65 - 131/76)  RR: 17 (10 Jul 2023 05:55) (16 - 17)  SpO2: 93% (10 Jul 2023 05:55) (93% - 96%)  I&O's Summary    08 Jul 2023 07:01  -  09 Jul 2023 07:00  --------------------------------------------------------  IN: 0 mL / OUT: 1 mL / NET: -1 mL      PHYSICAL EXAM:  General: NAD, A/O x 3  ENT: MMM  Neck: Supple, No JVD  Lungs: Clear to auscultation bilaterally, Non labored breathing   Cardio: RRR, S1/S2, No murmurs  Abdomen: Soft, Nontender, Nondistended; Bowel sounds present  Extremities: No calf tenderness, No pitting edema    LABS:                        10.5   10.07 )-----------( 195      ( 09 Jul 2023 06:17 )             32.0     07-09    137  |  102  |  14  ----------------------------<  113  3.5   |  25  |  0.66    Ca    8.9      09 Jul 2023 06:17  Phos  2.4     07-09  Mg     1.9     07-09    TPro  7.3  /  Alb  3.3  /  TBili  0.9  /  DBili  x   /  AST  19  /  ALT  12  /  AlkPhos  81  07-08      PT/INR - ( 08 Jul 2023 11:40 )   PT: 18.3 sec;   INR: 1.57 ratio         PTT - ( 08 Jul 2023 11:40 )  PTT:34.7 sec  Lactate, Blood: 0.6 mmol/L (07-08 @ 11:40)                          Urinalysis Basic - ( 09 Jul 2023 06:17 )    Color: x / Appearance: x / SG: x / pH: x  Gluc: 113 mg/dL / Ketone: x  / Bili: x / Urobili: x   Blood: x / Protein: x / Nitrite: x   Leuk Esterase: x / RBC: x / WBC x   Sq Epi: x / Non Sq Epi: x / Bacteria: x        Culture - Blood (collected 08 Jul 2023 11:40)  Source: .Blood Blood-Peripheral  Preliminary Report (09 Jul 2023 17:01):    No growth at 24 hours    Culture - Blood (collected 08 Jul 2023 11:40)  Source: .Blood Blood-Peripheral  Preliminary Report (09 Jul 2023 17:01):    No growth at 24 hours        RADIOLOGY & ADDITIONAL TESTS:    Care Discussed with Consultants/Other Providers:    Patient is a 88y old  Female who presents with a chief complaint of cough (09 Jul 2023 13:18)      Patient seen and examined at bedside. Unable to obtain ROS due to mental status    ALLERGIES:  penicillin (Unknown)    MEDICATIONS  (STANDING):  apixaban 2.5 milliGRAM(s) Oral two times a day  cyanocobalamin 1000 MICROGram(s) Oral daily  furosemide    Tablet 20 milliGRAM(s) Oral daily  guaifenesin/dextromethorphan Oral Liquid 10 milliLiter(s) Oral every 8 hours  metoprolol succinate ER 25 milliGRAM(s) Oral daily  multivitamin 1 Tablet(s) Oral daily  traMADol 50 milliGRAM(s) Oral three times a day    MEDICATIONS  (PRN):  acetaminophen     Tablet .. 650 milliGRAM(s) Oral every 6 hours PRN Mild Pain (1 - 3)  acetaminophen     Tablet .. 650 milliGRAM(s) Oral every 6 hours PRN Temp greater or equal to 38C (100.4F)  albuterol    0.083% 2.5 milliGRAM(s) Nebulizer every 6 hours PRN Shortness of Breath and/or Wheezing  aluminum hydroxide/magnesium hydroxide/simethicone Suspension 30 milliLiter(s) Oral every 4 hours PRN Dyspepsia  melatonin 3 milliGRAM(s) Oral at bedtime PRN Insomnia  OLANZapine 2.5 milliGRAM(s) Oral at bedtime PRN agitations  ondansetron Injectable 4 milliGRAM(s) IV Push every 8 hours PRN Nausea and/or Vomiting    Vital Signs Last 24 Hrs  T(F): 97.8 (10 Jul 2023 05:55), Max: 98.2 (09 Jul 2023 15:30)  HR: 85 (10 Jul 2023 05:55) (85 - 105)  BP: 122/65 (10 Jul 2023 05:55) (122/65 - 131/76)  RR: 17 (10 Jul 2023 05:55) (16 - 17)  SpO2: 93% (10 Jul 2023 05:55) (93% - 96%)  I&O's Summary    08 Jul 2023 07:01  -  09 Jul 2023 07:00  --------------------------------------------------------  IN: 0 mL / OUT: 1 mL / NET: -1 mL      PHYSICAL EXAM:  General: NAD, alert  ENT: MMM  Neck: Supple, No JVD  Lungs: Clear to auscultation bilaterally, Non labored breathing   Cardio: IRRR, S1/S2, No murmurs  Abdomen: Soft, Nontender, Nondistended; Bowel sounds present  Extremities: No calf tenderness, No pitting edema    LABS:                        10.5   10.07 )-----------( 195      ( 09 Jul 2023 06:17 )             32.0     07-09    137  |  102  |  14  ----------------------------<  113  3.5   |  25  |  0.66    Ca    8.9      09 Jul 2023 06:17  Phos  2.4     07-09  Mg     1.9     07-09    TPro  7.3  /  Alb  3.3  /  TBili  0.9  /  DBili  x   /  AST  19  /  ALT  12  /  AlkPhos  81  07-08      PT/INR - ( 08 Jul 2023 11:40 )   PT: 18.3 sec;   INR: 1.57 ratio         PTT - ( 08 Jul 2023 11:40 )  PTT:34.7 sec  Lactate, Blood: 0.6 mmol/L (07-08 @ 11:40)            Urinalysis Basic - ( 09 Jul 2023 06:17 )    Color: x / Appearance: x / SG: x / pH: x  Gluc: 113 mg/dL / Ketone: x  / Bili: x / Urobili: x   Blood: x / Protein: x / Nitrite: x   Leuk Esterase: x / RBC: x / WBC x   Sq Epi: x / Non Sq Epi: x / Bacteria: x        Culture - Blood (collected 08 Jul 2023 11:40)  Source: .Blood Blood-Peripheral  Preliminary Report (09 Jul 2023 17:01):    No growth at 24 hours    Culture - Blood (collected 08 Jul 2023 11:40)  Source: .Blood Blood-Peripheral  Preliminary Report (09 Jul 2023 17:01):    No growth at 24 hours        RADIOLOGY & ADDITIONAL TESTS:    Care Discussed with Consultants/Other Providers:    Patient is a 88y old  Female who presents with a chief complaint of cough (09 Jul 2023 13:18)      Patient seen and examined at bedside. Unable to obtain ROS due to mental status  Afebrile     ALLERGIES:  penicillin (Unknown)    MEDICATIONS  (STANDING):  apixaban 2.5 milliGRAM(s) Oral two times a day  cyanocobalamin 1000 MICROGram(s) Oral daily  furosemide    Tablet 20 milliGRAM(s) Oral daily  guaifenesin/dextromethorphan Oral Liquid 10 milliLiter(s) Oral every 8 hours  metoprolol succinate ER 25 milliGRAM(s) Oral daily  multivitamin 1 Tablet(s) Oral daily  traMADol 50 milliGRAM(s) Oral three times a day    MEDICATIONS  (PRN):  acetaminophen     Tablet .. 650 milliGRAM(s) Oral every 6 hours PRN Mild Pain (1 - 3)  acetaminophen     Tablet .. 650 milliGRAM(s) Oral every 6 hours PRN Temp greater or equal to 38C (100.4F)  albuterol    0.083% 2.5 milliGRAM(s) Nebulizer every 6 hours PRN Shortness of Breath and/or Wheezing  aluminum hydroxide/magnesium hydroxide/simethicone Suspension 30 milliLiter(s) Oral every 4 hours PRN Dyspepsia  melatonin 3 milliGRAM(s) Oral at bedtime PRN Insomnia  OLANZapine 2.5 milliGRAM(s) Oral at bedtime PRN agitations  ondansetron Injectable 4 milliGRAM(s) IV Push every 8 hours PRN Nausea and/or Vomiting    Vital Signs Last 24 Hrs  T(F): 97.8 (10 Jul 2023 05:55), Max: 98.2 (09 Jul 2023 15:30)  HR: 85 (10 Jul 2023 05:55) (85 - 105)  BP: 122/65 (10 Jul 2023 05:55) (122/65 - 131/76)  RR: 17 (10 Jul 2023 05:55) (16 - 17)  SpO2: 93% (10 Jul 2023 05:55) (93% - 96%)  I&O's Summary    08 Jul 2023 07:01  -  09 Jul 2023 07:00  --------------------------------------------------------  IN: 0 mL / OUT: 1 mL / NET: -1 mL      PHYSICAL EXAM:  General: NAD, alert  ENT: MMM  Neck: Supple, No JVD  Lungs: Clear to auscultation bilaterally, Non labored breathing   Cardio: IRRR, S1/S2, No murmurs  Abdomen: Soft, Nontender, Nondistended; Bowel sounds present  Extremities: No calf tenderness, No pitting edema    LABS:                        10.5   10.07 )-----------( 195      ( 09 Jul 2023 06:17 )             32.0     07-09    137  |  102  |  14  ----------------------------<  113  3.5   |  25  |  0.66    Ca    8.9      09 Jul 2023 06:17  Phos  2.4     07-09  Mg     1.9     07-09    TPro  7.3  /  Alb  3.3  /  TBili  0.9  /  DBili  x   /  AST  19  /  ALT  12  /  AlkPhos  81  07-08      PT/INR - ( 08 Jul 2023 11:40 )   PT: 18.3 sec;   INR: 1.57 ratio         PTT - ( 08 Jul 2023 11:40 )  PTT:34.7 sec  Lactate, Blood: 0.6 mmol/L (07-08 @ 11:40)            Urinalysis Basic - ( 09 Jul 2023 06:17 )    Color: x / Appearance: x / SG: x / pH: x  Gluc: 113 mg/dL / Ketone: x  / Bili: x / Urobili: x   Blood: x / Protein: x / Nitrite: x   Leuk Esterase: x / RBC: x / WBC x   Sq Epi: x / Non Sq Epi: x / Bacteria: x        Culture - Blood (collected 08 Jul 2023 11:40)  Source: .Blood Blood-Peripheral  Preliminary Report (09 Jul 2023 17:01):    No growth at 24 hours    Culture - Blood (collected 08 Jul 2023 11:40)  Source: .Blood Blood-Peripheral  Preliminary Report (09 Jul 2023 17:01):    No growth at 24 hours        RADIOLOGY & ADDITIONAL TESTS:    Care Discussed with Consultants/Other Providers:

## 2023-07-10 NOTE — PHYSICAL THERAPY INITIAL EVALUATION ADULT - NSPTDISCHREC_GEN_A_CORE
return to prior set up at [facility] return to prior set up at [facility] return to prior set up at [facility]

## 2023-07-10 NOTE — PROGRESS NOTE ADULT - TIME BILLING
Reviewing chart notes and data, face to face time counseling the patient and daughter, coordinating care with SW/CM at [facility]. Reviewing chart notes and data, face to face time counseling the patient and daughter, coordinating care with SW/CM at [facility]. Reviewing chart notes and data, face to face time counseling the patient and daughter, coordinating care with SW/CM at [facility].

## 2023-07-13 LAB
CULTURE RESULTS: SIGNIFICANT CHANGE UP
SPECIMEN SOURCE: SIGNIFICANT CHANGE UP

## 2023-08-29 ENCOUNTER — INPATIENT (INPATIENT)
Facility: HOSPITAL | Age: 88
LOS: 5 days | Discharge: NURSING, REHAB OR LONG-TERM CARE | DRG: 178 | End: 2023-09-04
Attending: HOSPITALIST | Admitting: HOSPITALIST
Payer: MEDICARE

## 2023-08-29 VITALS
SYSTOLIC BLOOD PRESSURE: 126 MMHG | HEIGHT: 57 IN | RESPIRATION RATE: 18 BRPM | OXYGEN SATURATION: 96 % | DIASTOLIC BLOOD PRESSURE: 82 MMHG | WEIGHT: 145.06 LBS | HEART RATE: 95 BPM | TEMPERATURE: 97 F

## 2023-08-29 DIAGNOSIS — B34.9 VIRAL INFECTION, UNSPECIFIED: ICD-10-CM

## 2023-08-29 DIAGNOSIS — Z90.710 ACQUIRED ABSENCE OF BOTH CERVIX AND UTERUS: Chronic | ICD-10-CM

## 2023-08-29 LAB
ALBUMIN SERPL ELPH-MCNC: 3.6 G/DL — SIGNIFICANT CHANGE UP (ref 3.3–5)
ALP SERPL-CCNC: 69 U/L — SIGNIFICANT CHANGE UP (ref 40–120)
ALT FLD-CCNC: 17 U/L — SIGNIFICANT CHANGE UP (ref 10–45)
ANION GAP SERPL CALC-SCNC: 6 MMOL/L — SIGNIFICANT CHANGE UP (ref 5–17)
AST SERPL-CCNC: 17 U/L — SIGNIFICANT CHANGE UP (ref 10–40)
BASOPHILS # BLD AUTO: 0.05 K/UL — SIGNIFICANT CHANGE UP (ref 0–0.2)
BASOPHILS NFR BLD AUTO: 0.8 % — SIGNIFICANT CHANGE UP (ref 0–2)
BILIRUB SERPL-MCNC: 0.6 MG/DL — SIGNIFICANT CHANGE UP (ref 0.2–1.2)
BUN SERPL-MCNC: 18 MG/DL — SIGNIFICANT CHANGE UP (ref 7–23)
CALCIUM SERPL-MCNC: 9.3 MG/DL — SIGNIFICANT CHANGE UP (ref 8.4–10.5)
CHLORIDE SERPL-SCNC: 103 MMOL/L — SIGNIFICANT CHANGE UP (ref 96–108)
CO2 SERPL-SCNC: 29 MMOL/L — SIGNIFICANT CHANGE UP (ref 22–31)
CREAT SERPL-MCNC: 0.71 MG/DL — SIGNIFICANT CHANGE UP (ref 0.5–1.3)
EGFR: 82 ML/MIN/1.73M2 — SIGNIFICANT CHANGE UP
EOSINOPHIL # BLD AUTO: 0.13 K/UL — SIGNIFICANT CHANGE UP (ref 0–0.5)
EOSINOPHIL NFR BLD AUTO: 2 % — SIGNIFICANT CHANGE UP (ref 0–6)
FLUAV AG NPH QL: SIGNIFICANT CHANGE UP
FLUBV AG NPH QL: SIGNIFICANT CHANGE UP
GLUCOSE SERPL-MCNC: 102 MG/DL — HIGH (ref 70–99)
HCT VFR BLD CALC: 36.6 % — SIGNIFICANT CHANGE UP (ref 34.5–45)
HGB BLD-MCNC: 12 G/DL — SIGNIFICANT CHANGE UP (ref 11.5–15.5)
IMM GRANULOCYTES NFR BLD AUTO: 0.3 % — SIGNIFICANT CHANGE UP (ref 0–0.9)
LYMPHOCYTES # BLD AUTO: 1.93 K/UL — SIGNIFICANT CHANGE UP (ref 1–3.3)
LYMPHOCYTES # BLD AUTO: 29.3 % — SIGNIFICANT CHANGE UP (ref 13–44)
MCHC RBC-ENTMCNC: 29.5 PG — SIGNIFICANT CHANGE UP (ref 27–34)
MCHC RBC-ENTMCNC: 32.8 GM/DL — SIGNIFICANT CHANGE UP (ref 32–36)
MCV RBC AUTO: 89.9 FL — SIGNIFICANT CHANGE UP (ref 80–100)
MONOCYTES # BLD AUTO: 0.9 K/UL — SIGNIFICANT CHANGE UP (ref 0–0.9)
MONOCYTES NFR BLD AUTO: 13.7 % — SIGNIFICANT CHANGE UP (ref 2–14)
NEUTROPHILS # BLD AUTO: 3.56 K/UL — SIGNIFICANT CHANGE UP (ref 1.8–7.4)
NEUTROPHILS NFR BLD AUTO: 53.9 % — SIGNIFICANT CHANGE UP (ref 43–77)
NRBC # BLD: 0 /100 WBCS — SIGNIFICANT CHANGE UP (ref 0–0)
PLATELET # BLD AUTO: 210 K/UL — SIGNIFICANT CHANGE UP (ref 150–400)
POTASSIUM SERPL-MCNC: 4 MMOL/L — SIGNIFICANT CHANGE UP (ref 3.5–5.3)
POTASSIUM SERPL-SCNC: 4 MMOL/L — SIGNIFICANT CHANGE UP (ref 3.5–5.3)
PROT SERPL-MCNC: 7.4 G/DL — SIGNIFICANT CHANGE UP (ref 6–8.3)
RBC # BLD: 4.07 M/UL — SIGNIFICANT CHANGE UP (ref 3.8–5.2)
RBC # FLD: 15.5 % — HIGH (ref 10.3–14.5)
RSV RNA NPH QL NAA+NON-PROBE: SIGNIFICANT CHANGE UP
SARS-COV-2 RNA SPEC QL NAA+PROBE: DETECTED
SODIUM SERPL-SCNC: 138 MMOL/L — SIGNIFICANT CHANGE UP (ref 135–145)
WBC # BLD: 6.59 K/UL — SIGNIFICANT CHANGE UP (ref 3.8–10.5)
WBC # FLD AUTO: 6.59 K/UL — SIGNIFICANT CHANGE UP (ref 3.8–10.5)

## 2023-08-29 PROCEDURE — 93010 ELECTROCARDIOGRAM REPORT: CPT

## 2023-08-29 PROCEDURE — 71045 X-RAY EXAM CHEST 1 VIEW: CPT | Mod: 26

## 2023-08-29 PROCEDURE — 99222 1ST HOSP IP/OBS MODERATE 55: CPT

## 2023-08-29 PROCEDURE — 99285 EMERGENCY DEPT VISIT HI MDM: CPT

## 2023-08-29 RX ORDER — METOPROLOL TARTRATE 50 MG
25 TABLET ORAL DAILY
Refills: 0 | Status: DISCONTINUED | OUTPATIENT
Start: 2023-08-29 | End: 2023-09-04

## 2023-08-29 RX ORDER — PREGABALIN 225 MG/1
1000 CAPSULE ORAL DAILY
Refills: 0 | Status: DISCONTINUED | OUTPATIENT
Start: 2023-08-30 | End: 2023-09-04

## 2023-08-29 RX ORDER — OLANZAPINE 15 MG/1
2.5 TABLET, FILM COATED ORAL AT BEDTIME
Refills: 0 | Status: DISCONTINUED | OUTPATIENT
Start: 2023-08-29 | End: 2023-09-04

## 2023-08-29 RX ORDER — ACETAMINOPHEN 500 MG
650 TABLET ORAL EVERY 6 HOURS
Refills: 0 | Status: DISCONTINUED | OUTPATIENT
Start: 2023-08-29 | End: 2023-09-04

## 2023-08-29 RX ORDER — OLANZAPINE 15 MG/1
2.5 TABLET, FILM COATED ORAL ONCE
Refills: 0 | Status: COMPLETED | OUTPATIENT
Start: 2023-08-29 | End: 2023-08-29

## 2023-08-29 RX ORDER — APIXABAN 2.5 MG/1
2.5 TABLET, FILM COATED ORAL
Refills: 0 | Status: DISCONTINUED | OUTPATIENT
Start: 2023-08-29 | End: 2023-09-04

## 2023-08-29 RX ORDER — FUROSEMIDE 40 MG
20 TABLET ORAL DAILY
Refills: 0 | Status: DISCONTINUED | OUTPATIENT
Start: 2023-08-30 | End: 2023-09-04

## 2023-08-29 RX ORDER — TRAMADOL HYDROCHLORIDE 50 MG/1
50 TABLET ORAL THREE TIMES A DAY
Refills: 0 | Status: DISCONTINUED | OUTPATIENT
Start: 2023-08-29 | End: 2023-08-30

## 2023-08-29 RX ADMIN — OLANZAPINE 2.5 MILLIGRAM(S): 15 TABLET, FILM COATED ORAL at 19:26

## 2023-08-29 RX ADMIN — APIXABAN 2.5 MILLIGRAM(S): 2.5 TABLET, FILM COATED ORAL at 19:27

## 2023-08-29 NOTE — H&P ADULT - NSHPPHYSICALEXAM_GEN_ALL_CORE
T(C): 36.3 (08-29-23 @ 09:40), Max: 36.3 (08-29-23 @ 09:40)  HR: 95 (08-29-23 @ 09:40) (95 - 95)  BP: 126/82 (08-29-23 @ 09:40) (126/82 - 126/82)  RR: 18 (08-29-23 @ 09:40) (18 - 18)  SpO2: 96% (08-29-23 @ 09:40) (96% - 96%)  Wt(kg): --Vital Signs Last 24 Hrs  T(C): 36.3 (29 Aug 2023 09:40), Max: 36.3 (29 Aug 2023 09:40)  T(F): 97.3 (29 Aug 2023 09:40), Max: 97.3 (29 Aug 2023 09:40)  HR: 95 (29 Aug 2023 09:40) (95 - 95)  BP: 126/82 (29 Aug 2023 09:40) (126/82 - 126/82)  BP(mean): --  RR: 18 (29 Aug 2023 09:40) (18 - 18)  SpO2: 96% (29 Aug 2023 09:40) (96% - 96%)    Parameters below as of 29 Aug 2023 09:40  Patient On (Oxygen Delivery Method): room air        PHYSICAL EXAM:  GENERAL: NAD  HENT:  Atraumatic, Normocephalic; No tonsillar erythema, exudates, or enlargement; Moist mucous membranes;   EYES: EOMI, PERRLA, conjunctiva and sclera clear, no lid-lag  NECK: Supple, No JVD, Normal thyroid  NERVOUS SYSTEM:  CN II - XII intact; Sensation intact; Motor Strength 5/5 B/L upper and lower extremities  CHEST/LUNG: Clear to percussion bilaterally; No rales, rhonchi, wheezing, or rubs; normal respiratory effort, no intercostal retractions; No pitting edema  HEART: Regular rate and rhythm; No murmurs, rubs, or gallops  ABDOMEN: Soft, Nontender, Nondistended; Bowel sounds present; No HSM  MUSCULOSKELETAL/EXTREMITIES:  2+ Peripheral Pulses, No clubbing, or digital cyanosis  SKIN: No rashes or lesions; normal texture and temperature  PSYCH: Appropriate affect, Alert & Awake

## 2023-08-29 NOTE — H&P ADULT - ASSESSMENT
88F with PMH dementia, chronic a fib (on Eliquis), diastolic CHF admitted for COVID -19 infection.    COVID-19   - Maintain on airborne isolation.  - Obtain daily room air O2 saturations  - No need to start Remdesivir IV or Decadron  - Acetaminophen 650 mg PO PRN fever. Limit use of NSAIDs.  - Goals of Care discussion had with patient/surrogate: (free text here)    Chronic CHFpEF  appears clinically euvolemic  continue lasix 20 daily, metoprolol    HTN  chronic condition  continue metoprolol    Chronic Atrial Fibrillation  rate controlled with toprol  continue eliqius for AC    DVT PPx with eliquis    Code Status: DNR/DNI    Daughter Silvia: [phone]  Interested in [facility] since she lives close to there. Aware of dispo to [facility]. 88F with PMH dementia, chronic a fib (on Eliquis), diastolic CHF admitted for COVID -19 infection.    COVID-19   - Maintain on airborne isolation.  - Obtain daily room air O2 saturations  - No need to start Remdesivir IV or Decadron  - Acetaminophen 650 mg PO PRN fever. Limit use of NSAIDs.  - Goals of Care discussion had with patient/surrogate: (free text here)    Chronic CHFpEF  appears clinically euvolemic  continue lasix 20 daily, metoprolol    HTN  chronic condition  continue metoprolol    Chronic Atrial Fibrillation  rate controlled with toprol  continue eliqius for AC    DVT PPx with eliquis    Code Status: DNR/DNI    Daughter Silvia: [phone]  Interested in [facility] since she lives close to there. Aware of dispo to [facility]. 88F with PMH dementia, chronic a fib (on Eliquis), diastolic CHF admitted for COVID -19 infection.    COVID-19   - Maintain on airborne isolation.  - Obtain daily room air O2 saturations  - No need to start Remdesivir IV or Decadron  - Acetaminophen 650 mg PO PRN fever. Limit use of NSAIDs.  - Goals of Care discussion had with patient/surrogate: (free text here)    Chronic CHFpEF  appears clinically euvolemic  continue lasix 20 daily, metoprolol    HTN  chronic condition  continue metoprolol    Chronic Atrial Fibrillation  rate controlled with toprol  continue eliqius for AC    DVT PPx with eliquis    Code Status: DNR/DNI    Daughter Silvia: [phone]  Interested in [facility] since she lives close to there. Aware of dispo to [facility].

## 2023-08-29 NOTE — ED ADULT NURSE NOTE - CHIEF COMPLAINT QUOTE
Patient BIB EMS from [facility] in [place]. Staff reports cough starting this morning. Patient A&Ox1 at baseline as per EMS, hx of dementia. Patient BIB EMS from [facility] in [place]. Staff reports cough starting this morning. Patient A&Ox1 at baseline as per EMS, hx of dementia. Patient BIB EMS from [facility] in [place]. Staff reports cough starting this morning. Patient A&Ox1 at baseline as per EMS, hx of dementia.

## 2023-08-29 NOTE — ED PROVIDER NOTE - PRINCIPAL DIAGNOSIS
Anxiety/ Panic attacks:  8/23  Feels anxiety was school-related, so doing well overall, but notes increased anxiety/ irritability on Drospirenone.  Advised change to lower-dose pill. Increase self-care, exercise. If not better consider counseling, medication      Obesity:  8/23 Weight 153, BMI 30.9; notes weight gain despite no change in intake/ activity level that coincided with start of job requiring later dinners (8pm) due to work schedule;  encouraged lifestyle changes, with earlier caloric intake daily, limit late night eating, check TSH reflex      Classic migraine HA's:  8/23 Common migraine frequency, severity improved (q 2 weeks x 1 d) on Nurtec; f/u Neuro/ [facility] 9/23 as scheduled     Hx Marijuana use:  2016 or earlier onset of use; no tobacco use/ vaping; denies other recreational drug use.      Ulcerative Colitis:     8/23 Colonoscopy mild proctitis per bx, normal otherwise per GI/ [facility].     8/23  GI: switch to Mesalamine supp since dz is limited to proctitis     8/23  Check  labs, f/u GI/ [facility]lin as advised, repeat colonoscopy 8/25           GYNE:  8/23  Switch Drospirenone to Norethindrone due to increased irritability,anxiety.  SE RD, including slightly reduced efficacy, need for condoms, and back up if >  3 hours late in taking.     Alcohol Use:  8/23 Monitor and limit use    Mild thrombocytosis:  8/23 Check repeat CBC, if thrombocytosis, check iron levels.        Hx ACL repair   2016 ACL repair per Ortho/ Ali.   Recurrent ankle sprains with ankle weakness, subjective laxity.      Immunizations:  Adacel 2019, Hep A/B, HPV  Hep C Ab negative     RTO 6 months, or sooner prn any concern.    
Viral illness

## 2023-08-29 NOTE — ED ADULT TRIAGE NOTE - INTERNATIONAL TRAVEL
No Neck non tender, supple without pain, back, chest wall, pelvis non tender, stable, MAEx4. Neck non tender, supple without pain, back, chest wall, pelvis non tender, stable, MAEx4.  No focal extremity deformity, swelling nor tenderness.

## 2023-08-29 NOTE — ED ADULT TRIAGE NOTE - HEART RATE (BEATS/MIN)
95 Mohs Histo Method Verbiage: Each section was then chromacoded and processed in the Mohs lab using the Mohs protocol and submitted for frozen section.

## 2023-08-29 NOTE — H&P ADULT - HISTORY OF PRESENT ILLNESS
88F with PMH dementia, chronic a fib (on Eliquis), diastolic CHF brought in by EMS from [facility] for cough and sore throat. Patient has Dementia and was not able to contribute to the history. She states she has no complaints. As per ED, patient is COVID positive and cannot be sent back to her housing. Patient is for placement to [facility].     88F with PMH dementia, chronic a fib (on Eliquis), diastolic CHF brought in by EMS from [facility] for cough and sore throat. Patient has Dementia and was not able to contribute to the history. She states she has no complaints. As per ED, patient is COVID positive and cannot be sent back to her housing. Patient is for placement to [facility].     88F with PMH dementia, chronic a fib (on Eliquis), diastolic CHF brought in by EMS from [facility] for cough and sore throat. Patient has Dementia and was not able to contribute to the history. She states she has no complaints. As per ED, patient is COVID positive and cannot be sent back to her housing. Patient is for placement to [facility].

## 2023-08-29 NOTE — ED PROVIDER NOTE - OBJECTIVE STATEMENT
88 -year-old female with past medical history of A-fib on Eliquis CHF on Lasix, dementia was brought in by EMS from [facility] for covid swab. facility states pt has c/o cough and sore throat. pt denies any symptoms and no cough while in ED. there is covid going around in the facilty and they would like pt swab. denies SOB, chest pain, abd pain, nausea, vomiting, diarrhea, leg pain, leg swelling. 88 -year-old female with past medical history of A-fib on Eliquis CHF on Lasix, dementia was brought in by EMS from [facility] for covid swab. facility states pt has c/o cough and sore throat. pt denies any symptoms and no cough while in ED. there is covid going around in the facilty and they would like pt swab. denies SOB, chest pain, abd pain, nausea, vomiting, diarrhea, leg pain, leg swelling. 88 -year-old female with past medical history of A-fib on Eliquis CHF on Lasix, dementia was brought in by EMS from [facility] for covid swab. facility states pt has c/o cough and sore throat. pt denies any symptoms and no cough while in ED. there is covid going around in the facilty and they would like pt swab. denies SOB, chest pain, abd pain, nausea, vomiting, diarrhea, leg pain, leg swelling. 88 -year-old female with past medical history of A-fib on Eliquis CHF on Lasix, dementia was brought in by EMS from [facility] for covid swab. facility states pt has c/o cough and sore throat. pt denies any symptoms and no cough while in ED. there is covid going around in the facility and they would like pt swab. denies SOB, chest pain, abd pain, nausea, vomiting, diarrhea, leg pain, leg swelling. 88 -year-old female with past medical history of A-fib on Eliquis CHF on Lasix, dementia was brought in by EMS from [facility] for covid swab. facility states pt has c/o cough and sore throat. pt denies any symptoms and no cough while in ED. there is covid going around in the facility and they would like pt swab. denies SOB, chest pain, abd pain, nausea, vomiting, diarrhea, leg pain, leg swelling. 88 -year-old female with past medical history of A-fib on Eliquis CHF on Lasix, dementia was brought in by EMS from [facility] for covid swab. facility states pt has c/o cough and sore throat. pt denies any symptoms and no cough while in ED. there is covid going around in the facility and they would like pt swab. denies SOB, chest pain, abd pain, nausea, vomiting, diarrhea, leg pain, leg swelling.

## 2023-08-29 NOTE — ED ADULT TRIAGE NOTE - CHIEF COMPLAINT QUOTE
Patient BIB EMS from [facility] in [place]. Staff reports cough starting this morning. Patient A&Ox1 at baseline as per EMS, hx of dementia. Patient BIB EMS from [facility] in [place]. Staff reports cough starting this morning. Patient A&Ox1 at baseline as per EMS, hx of dementia. Patient BIB EMS from [facility] in [place]. Staff reports cough starting this morning. Patient A&Ox1 at baseline as per EMS, hx of dementia.

## 2023-08-29 NOTE — ED PROVIDER NOTE - PHYSICAL EXAMINATION
General:     NAD   Eyes: PERRL  Head:     NC/AT, oral mucosa moist  Neck:     trachea midline  Lungs:     CTA b/l  CVS:     irregular rhythm. normal rate  Abd:     soft and non tender  Ext:   no deformities   Neuro: AAOx1

## 2023-08-29 NOTE — ED ADULT NURSE NOTE - NSFALLUNIVINTERV_ED_ALL_ED
Bed/Stretcher in lowest position, wheels locked, appropriate side rails in place/Call bell, personal items and telephone in reach/Instruct patient to call for assistance before getting out of bed/chair/stretcher/Non-slip footwear applied when patient is off stretcher/[place] to call system/Physically safe environment - no spills, clutter or unnecessary equipment/Purposeful proactive rounding/Room/bathroom lighting operational, light cord in reach Bed/Stretcher in lowest position, wheels locked, appropriate side rails in place/Call bell, personal items and telephone in reach/Instruct patient to call for assistance before getting out of bed/chair/stretcher/Non-slip footwear applied when patient is off stretcher/[place] to call system/Physically safe environment - no spills, clutter or unnecessary equipment/Purposeful proactive rounding/Room/bathroom lighting operational, light cord in reach Bed/Stretcher in lowest position, wheels locked, appropriate side rails in place/Call bell, personal items and telephone in reach/Instruct patient to call for assistance before getting out of bed/chair/stretcher/Non-slip footwear applied when patient is off stretcher/[place] to call system/Physically safe environment - no spills, clutter or unnecessary equipment/Purposeful proactive rounding/Room/bathroom lighting operational, light cord in reach

## 2023-08-29 NOTE — ED PROVIDER NOTE - ATTENDING APP SHARED VISIT CONTRIBUTION OF CARE
Eval with PARVIZ Corbin. 88 -year-old female with past medical history of A-fib on Eliquis CHF on Lasix, dementia was brought in by EMS from [facility] for covid swab. facility states pt has c/o cough and sore throat. pt denies any symptoms and no cough while in ED. there is covid going around in the facilty and they would like pt swab. denies SOB, chest pain, abd pain, nausea, vomiting, diarrhea, leg pain, leg swelling.  covid swab and dc    pt covid + sating 99% on RA. afebrile. discussed with pts facility and they are not able to take her back to the facility. social work Yamileth assisting and she discussed w her . After discussing case and PT eval completed, recommend admit to obs and they will be working on [facility] authorization. Family aware.    I performed a face to face bedside interview with patient regarding history of present illness, review of symptoms and past medical history. I completed an independent physical exam.  I have discussed the patient's plan of care with Physician Assistant (PA). I agree with note as stated above, having amended the EMR as needed to reflect my findings.   This includes History of Present Illness, HIV, Past Medical/Surgical/Family/Social History, Allergies and Home Medications, Review of Systems, Physical Exam, and any Progress Notes during the time I functioned as the attending physician for this patient. Eval with PARVIZ Corbin. 88 -year-old female with past medical history of A-fib on Eliquis CHF on Lasix, dementia was brought in by EMS from [facility] for covid swab. facility states pt has c/o cough and sore throat. pt denies any symptoms and no cough while in ED. there is covid going around in the facilty and they would like pt swab. denies SOB, chest pain, abd pain, nausea, vomiting, diarrhea, leg pain, leg swelling.  covid swab and dc    pt covid + sating 99% on RA. afebrile. discussed with pts facility and they are not able to take her back to the facility. social work Yamileth assisting and she discussed w her . After discussing case and PT eval completed, recommend admit to obs and they will be working on [facility] authorization. Family aware.    I performed a face to face bedside interview with patient regarding history of present illness, review of symptoms and past medical history. I completed an independent physical exam.  I have discussed the patient's plan of care with Physician Assistant (PA). I agree with note as stated above, having amended the EMR as needed to reflect my findings.   This includes History of Present Illness, HIV, Past Medical/Surgical/Family/Social History, Allergies and Home Medications, Review of Systems, Physical Exam, and any Progress Notes during the time I functioned as the attending physician for this patient. Eval with PARVIZ Corbin. 88 -year-old female with past medical history of A-fib on Eliquis CHF on Lasix, dementia was brought in by EMS from [facility] for covid swab. facility states pt has c/o cough and sore throat. pt denies any symptoms and no cough while in ED. there is covid going around in the facilty and they would like pt swab. denies SOB, chest pain, abd pain, nausea, vomiting, diarrhea, leg pain, leg swelling.  covid swab and dc    pt covid + sating 99% on RA. afebrile. discussed with pts facility and they are not able to take her back to the facility. social work Yamileth assisting and she discussed w her . After discussing case and PT eval completed, recommend admit to obs and they will be working on [facility] authorization. Family aware.    I performed a face to face bedside interview with patient regarding history of present illness, review of symptoms and past medical history. I completed an independent physical exam.  I have discussed the patient's plan of care with Physician Assistant (PA). I agree with note as stated above, having amended the EMR as needed to reflect my findings.   This includes History of Present Illness, HIV, Past Medical/Surgical/Family/Social History, Allergies and Home Medications, Review of Systems, Physical Exam, and any Progress Notes during the time I functioned as the attending physician for this patient.

## 2023-08-29 NOTE — PHYSICAL THERAPY INITIAL EVALUATION ADULT - RANGE OF MOTION EXAMINATION, REHAB EVAL
bilateral upper extremity ROM was WNL (within normal limits)/bilateral lower extremity was ROM was WNL (within normal limits)
92.96

## 2023-08-29 NOTE — PATIENT PROFILE ADULT - FALL HARM RISK - RISK INTERVENTIONS
Assistance OOB with selected safe patient handling equipment/Assistance with ambulation/Communicate Fall Risk and Risk Factors to all staff, patient, and family/Discuss with provider need for PT consult/Monitor for mental status changes/Monitor gait and stability/Move patient closer to nurses' station/Reinforce activity limits and safety measures with patient and family/Reorient to person, place and time as needed/Toileting schedule using arm’s reach rule for commode and bathroom/Use of alarms - bed, chair and/or voice tab/Visual Cue: Yellow wristband/Bed in lowest position, wheels locked, appropriate side rails in place/Call bell, personal items and telephone in reach/Instruct patient to call for assistance before getting out of bed or chair/Non-slip footwear when patient is out of bed/[place] to call system/Physically safe environment - no spills, clutter or unnecessary equipment/Purposeful Proactive Rounding/Room/bathroom lighting operational, light cord in reach Assistance OOB with selected safe patient handling equipment/Assistance with ambulation/Communicate Fall Risk and Risk Factors to all staff, patient, and family/Discuss with provider need for PT consult/Monitor for mental status changes/Monitor gait and stability/Move patient closer to nurses' station/Reinforce activity limits and safety measures with patient and family/Reorient to person, place and time as needed/Toileting schedule using arm’s reach rule for commode and bathroom/Use of alarms - bed, chair and/or voice tab/Visual Cue: Yellow wristband/Bed in lowest position, wheels locked, appropriate side rails in place/Call bell, personal items and telephone in reach/Instruct patient to call for assistance before getting out of bed or chair/Non-slip footwear when patient is out of bed/[place] to call system/Physically safe environment - no spills, clutter or unnecessary equipment/Purposeful Proactive Rounding/Room/bathroom lighting operational, light cord in reach Assistance OOB with selected safe patient handling equipment/Assistance with ambulation/Communicate Fall Risk and Risk Factors to all staff, patient, and family/Discuss with provider need for PT consult/Monitor for mental status changes/Monitor gait and stability/Move patient closer to nurses' station/Reinforce activity limits and safety measures with patient and family/Reorient to person, place and time as needed/Toileting schedule using arm’s reach rule for commode and bathroom/Use of alarms - bed, chair and/or voice tab/Visual Cue: Yellow wristband/Bed in lowest position, wheels locked, appropriate side rails in place/Call bell, personal items and telephone in reach/Instruct patient to call for assistance before getting out of bed or chair/Non-slip footwear when patient is out of bed/[place] to call system/Physically safe environment - no spills, clutter or unnecessary equipment/Purposeful Proactive Rounding/Room/bathroom lighting operational, light cord in reach

## 2023-08-29 NOTE — PHYSICAL THERAPY INITIAL EVALUATION ADULT - PERTINENT HX OF CURRENT PROBLEM, REHAB EVAL
· Chief Complaint: The patient is a 88y Female complaining of cough.  · HPI Objective Statement: 88 -year-old female with past medical history of A-fib on Eliquis CHF on Lasix, dementia was brought in by EMS from [facility] for covid swab. facility states pt has c/o cough and sore throat. pt denies any symptoms and no cough while in ED. there is covid going around in the facilty and they would like pt swab. denies SOB, chest pain, abd pain, nausea, vomiting, diarrhea, leg pain, leg swelling. · Chief Complaint: The patient is a 88y Female complaining of cough.  · HPI Objective Statement: 88 -year-old female with past medical history of A-fib on Eliquis CHF on Lasix, dementia was brought in by EMS from [facility] for covid swab. facility states pt has c/o cough and sore throat. pt denies any symptoms and no cough while in ED. there is covid going around in the facilty and they would like pt swab. denies SOB, chest pain, abd pain, nausea, vomiting, diarrhea, leg pain, leg swelling. · Chief Complaint: The patient is a 88y Female complaining of cough.  · HPI Objective Statement: 88 -year-old female with past medical history of A-fib on Eliquis CHF on Lasix, dementia was brought in by EMS from [facility] for covid swab. facility states pt has c/o cough and sore throat. pt denies any symptoms and no cough while in ED. there is covid going around in the facilty and they would like pt swab. denies SOB, chest pain, abd pain, nausea, vomiting, diarrhea, leg pain, leg swelling.

## 2023-08-29 NOTE — CHART NOTE - NSCHARTNOTEFT_GEN_A_CORE
Pending auth#[id number] received from [employer]. Plan for discharge to [facility] when auth is obtained. Pending auth#[id number] received from [employer]. Plan for discharge to [facility] when auth is obtained. Pending auth#[id number] received from [employer]. Plan for discharge to [facility] when auth is obtained.

## 2023-08-29 NOTE — ED PROVIDER NOTE - CLINICAL SUMMARY MEDICAL DECISION MAKING FREE TEXT BOX
88 -year-old female with past medical history of A-fib on Eliquis CHF on Lasix, dementia was brought in by EMS from [facility] for covid swab. facility states pt has c/o cough and sore throat. pt denies any symptoms and no cough while in ED. there is covid going around in the facilty and they would like pt swab. denies SOB, chest pain, abd pain, nausea, vomiting, diarrhea, leg pain, leg swelling.  covid swab and dc 88 -year-old female with past medical history of A-fib on Eliquis CHF on Lasix, dementia was brought in by EMS from [facility] for covid swab. facility states pt has c/o cough and sore throat. pt denies any symptoms and no cough while in ED. there is covid going around in the facilty and they would like pt swab. denies SOB, chest pain, abd pain, nausea, vomiting, diarrhea, leg pain, leg swelling.  covid swab and dc 88 -year-old female with past medical history of A-fib on Eliquis CHF on Lasix, dementia was brought in by EMS from [facility] for covid swab. facility states pt has c/o cough and sore throat. pt denies any symptoms and no cough while in ED. there is covid going around in the facilty and they would like pt swab. denies SOB, chest pain, abd pain, nausea, vomiting, diarrhea, leg pain, leg swelling.  covid swab and dc 88 -year-old female with past medical history of A-fib on Eliquis CHF on Lasix, dementia was brought in by EMS from [facility] for covid swab. facility states pt has c/o cough and sore throat. pt denies any symptoms and no cough while in ED. there is covid going around in the facilty and they would like pt swab. denies SOB, chest pain, abd pain, nausea, vomiting, diarrhea, leg pain, leg swelling.  covid swab and dc    pt covid + sating 99% on RA. a febrile. discussed with pts facility and they are not able to take her back to the facility. social work and  discussed case and PT eval and obs and they will be working on CHAN authorization 88 -year-old female with past medical history of A-fib on Eliquis CHF on Lasix, dementia was brought in by EMS from [facility] for covid swab. facility states pt has c/o cough and sore throat. pt denies any symptoms and no cough while in ED. there is covid going around in the facilty and they would like pt swab. denies SOB, chest pain, abd pain, nausea, vomiting, diarrhea, leg pain, leg swelling.  covid swab and dc    pt covid + sating 99% on RA. a febrile. discussed with pts facility and they are not able to take her back to the facility. social work and  discussed case and PT eval and obs and they will be working on CHAN authorization 88 -year-old female with past medical history of A-fib on Eliquis CHF on Lasix, dementia was brought in by EMS from [facility] for covid swab. facility states pt has c/o cough and sore throat. pt denies any symptoms and no cough while in ED. there is covid going around in the facilty and they would like pt swab. denies SOB, chest pain, abd pain, nausea, vomiting, diarrhea, leg pain, leg swelling.  covid swab and dc    pt covid + sating 99% on RA. a febrile. discussed with pts facility and they are not able to take her back to the facility. social work and  discussed case and PT eval and obs and they will be working on CHAN authorization

## 2023-08-29 NOTE — PHYSICAL THERAPY INITIAL EVALUATION ADULT - ADDITIONAL COMMENTS
pt lives in assisted living facility. pt reports she is independent with all amb without device and ADL's.

## 2023-08-30 PROCEDURE — 99233 SBSQ HOSP IP/OBS HIGH 50: CPT

## 2023-08-30 RX ORDER — TRAMADOL HYDROCHLORIDE 50 MG/1
50 TABLET ORAL THREE TIMES A DAY
Refills: 0 | Status: DISCONTINUED | OUTPATIENT
Start: 2023-08-30 | End: 2023-09-04

## 2023-08-30 RX ADMIN — PREGABALIN 1000 MICROGRAM(S): 225 CAPSULE ORAL at 11:44

## 2023-08-30 RX ADMIN — TRAMADOL HYDROCHLORIDE 50 MILLIGRAM(S): 50 TABLET ORAL at 15:40

## 2023-08-30 RX ADMIN — TRAMADOL HYDROCHLORIDE 50 MILLIGRAM(S): 50 TABLET ORAL at 21:03

## 2023-08-30 RX ADMIN — TRAMADOL HYDROCHLORIDE 50 MILLIGRAM(S): 50 TABLET ORAL at 22:03

## 2023-08-30 RX ADMIN — Medication 1 TABLET(S): at 11:45

## 2023-08-30 NOTE — PROGRESS NOTE ADULT - SUBJECTIVE AND OBJECTIVE BOX
Patient is a 88y old  Female who presents with a chief complaint of COVID-19 (29 Aug 2023 16:53)      Patient seen and examined at bedside. No events overnight. Patient pleasantly confused, denies sob, chest pain, cough, abd pain.    ALLERGIES:  penicillin (Unknown)    MEDICATIONS  (STANDING):  apixaban 2.5 milliGRAM(s) Oral two times a day  cyanocobalamin 1000 MICROGram(s) Oral daily  furosemide    Tablet 20 milliGRAM(s) Oral daily  metoprolol succinate ER 25 milliGRAM(s) Oral daily  multivitamin 1 Tablet(s) Oral daily  traMADol 50 milliGRAM(s) Oral three times a day    MEDICATIONS  (PRN):  acetaminophen     Tablet .. 650 milliGRAM(s) Oral every 6 hours PRN Mild Pain (1 - 3)  OLANZapine 2.5 milliGRAM(s) Oral at bedtime PRN agitation    Vital Signs Last 24 Hrs  T(F): 97.6 (30 Aug 2023 06:40), Max: 98.1 (29 Aug 2023 12:50)  HR: 74 (30 Aug 2023 06:40) (74 - 83)  BP: 125/65 (30 Aug 2023 06:40) (114/67 - 141/69)  RR: 19 (30 Aug 2023 06:40) (16 - 19)  SpO2: 97% (30 Aug 2023 06:40) (97% - 100%)  I&O's Summary    30 Aug 2023 07:01  -  30 Aug 2023 12:21  --------------------------------------------------------  IN: 520 mL / OUT: 0 mL / NET: 520 mL        PHYSICAL EXAM:  GENERAL: NAD, pleasantly confused  HEAD:  Atraumatic, Normocephalic  EYES: PEERL, conjunctiva and sclera clear  ENMT: Moist mucous membranes, Supple, No JVD  CHEST/LUNG: Clear to auscultation bilaterally, good air entry, non-labored breathing  HEART: RRR; S1/S2, No murmur  ABDOMEN: Soft, Nontender, Nondistended; Bowel sounds present  EXTREMITIES: No calf tenderness, No cyanosis, No edema  SKIN: Warm, perfused  PSYCH: Normal mood, Normal affect  NERVOUS SYSTEM:  awake and alert    LABS:                        12.0   6.59  )-----------( 210      ( 29 Aug 2023 17:20 )             36.6     08-29    138  |  103  |  18  ----------------------------<  102  4.0   |  29  |  0.71    Ca    9.3      29 Aug 2023 17:20    TPro  7.4  /  Alb  3.6  /  TBili  0.6  /  DBili  x   /  AST  17  /  ALT  17  /  AlkPhos  69  08-29                                Urinalysis Basic - ( 29 Aug 2023 17:20 )    Color: x / Appearance: x / SG: x / pH: x  Gluc: 102 mg/dL / Ketone: x  / Bili: x / Urobili: x   Blood: x / Protein: x / Nitrite: x   Leuk Esterase: x / RBC: x / WBC x   Sq Epi: x / Non Sq Epi: x / Bacteria: x            RADIOLOGY & ADDITIONAL TESTS:    Care Discussed with Consultants/Other Providers:

## 2023-08-30 NOTE — PROGRESS NOTE ADULT - ASSESSMENT
88 year old F PMH dementia, chronic a fib (on Eliquis), diastolic CHF admitted for COVID -19 infection.    #COVID-19   - Maintain on airborne isolation  - Obtain daily room air O2 saturations  - No need to start Remdesivir IV or Decadron as not hypoxic  - Acetaminophen 650 mg PO PRN fever. Limit use of NSAIDs.  - Goals of Care discussion had with patient/surrogate: DNR/DNI    #Chronic CHFpEF  - appears clinically euvolemic  - continue lasix 20 daily, metoprolol    #HTN  - chronic condition  - continue metoprolol    #Chronic Atrial Fibrillation  - rate controlled with toprol  - continue eliqius for AC    #DVT PPx   - on eliquis    discussed with daughter Silvia: [phone], udpated on clinical status and all questions answered. Patient unable to return to [facility] due to COVID status therefore interested in [facility]- pending auth 88 year old F PMH dementia, chronic a fib (on Eliquis), diastolic CHF admitted for COVID -19 infection.    #COVID-19   - Maintain on airborne isolation  - Obtain daily room air O2 saturations  - No need to start Remdesivir IV or Decadron as not hypoxic  - Acetaminophen 650 mg PO PRN fever. Limit use of NSAIDs.  - Goals of Care discussion had with patient/surrogate: DNR/DNI    #Chronic CHFpEF  - appears clinically euvolemic  - continue lasix 20 daily, metoprolol    #HTN  - chronic condition  - continue metoprolol    #Chronic Atrial Fibrillation  - rate controlled with toprol  - continue eliqius for AC    #DVT PPx   - on eliquis    discussed with daughter Silvia: [phone], udpated on clinical status and all questions answered. Patient unable to return to [facility] due to COVID status therefore interested in [facility]- pending auth 88 year old F PMH dementia, chronic a fib (on Eliquis), diastolic CHF admitted for COVID -19 infection.    #COVID-19   - Maintain on airborne isolation  - Obtain daily room air O2 saturations  - No need to start Remdesivir IV or Decadron as not hypoxic  - Acetaminophen 650 mg PO PRN fever. Limit use of NSAIDs.  - Goals of Care discussion had with patient/surrogate: DNR/DNI    #Chronic CHFpEF  - appears clinically euvolemic  - continue lasix 20 daily, metoprolol    #HTN  - chronic condition  - continue metoprolol    #Chronic Atrial Fibrillation  - rate controlled with toprol  - continue eliqius for AC    #DVT PPx   - on eliquis    discussed with daughter Silvia: [phone], udpated on clinical status and all questions answered. Patient unable to return to [facility] due to COVID status therefore interested in [facility]- pending auth 88 year old F PMH dementia, chronic a fib (on Eliquis), diastolic CHF admitted for COVID -19 infection.    #COVID-19   - Maintain on airborne isolation  - Obtain daily room air O2 saturations  - No need to start Remdesivir IV or Decadron as not hypoxic  - Acetaminophen 650 mg PO PRN fever. Limit use of NSAIDs.  - Goals of Care discussion had with patient/surrogate: DNR/DNI    #Chronic CHFpEF  - appears clinically euvolemic  - continue lasix 20 daily, metoprolol    #HTN  - chronic condition  - continue metoprolol    #Chronic Atrial Fibrillation  - rate controlled with toprol  - continue eliqius for AC    #DVT PPx   - on eliquis    discussed with daughter Silvia: [phone], updated on clinical status and all questions answered. Patient unable to return to [facility] due to COVID status and auth denied for CHAN. As per [facility]- can quarantine for 5 days and return, dc on 9/4 88 year old F PMH dementia, chronic a fib (on Eliquis), diastolic CHF admitted for COVID -19 infection.    #COVID-19   - Maintain on airborne isolation  - Obtain daily room air O2 saturations  - No need to start Remdesivir IV or Decadron as not hypoxic  - Acetaminophen 650 mg PO PRN fever. Limit use of NSAIDs.  - Goals of Care discussion had with patient/surrogate: DNR/DNI    #Chronic CHFpEF  - appears clinically euvolemic  - continue lasix 20 daily, metoprolol    #HTN  - chronic condition  - continue metoprolol    #Chronic Atrial Fibrillation  - rate controlled with toprol  - continue eliqius for AC    #DVT PPx   - on eliquis    discussed with daughter Silvia: [phone], updated on clinical status and all questions answered. Patient unable to return to [facility] due to COVID status and auth denied for CHAN. As per [facility]- can quarantine for 5 days and return, dc on 9/4 88 year old F PMH dementia, chronic a fib (on Eliquis), diastolic CHF admitted for COVID -19 infection.    #COVID-19   - Maintain on airborne isolation  - Obtain daily room air O2 saturations  - No need to start Remdesivir IV or Decadron as not hypoxic  - Acetaminophen 650 mg PO PRN fever. Limit use of NSAIDs.  - Goals of Care discussion had with patient/surrogate: DNR/DNI    #Chronic CHFpEF  - appears clinically euvolemic  - continue lasix 20 daily, metoprolol    #HTN  - chronic condition  - continue metoprolol    #Chronic Atrial Fibrillation  - rate controlled with toprol  - continue eliqius for AC    #DVT PPx   - on eliquis    discussed with daughter Silvia: [phone], updated on clinical status and all questions answered. Patient unable to return to [facility] due to COVID status and auth denied for CHAN. As per [facility]- can quarantine for 5 days and return, dc on 9/4

## 2023-08-31 PROCEDURE — 99232 SBSQ HOSP IP/OBS MODERATE 35: CPT

## 2023-08-31 RX ORDER — METOPROLOL TARTRATE 50 MG
12.5 TABLET ORAL ONCE
Refills: 0 | Status: COMPLETED | OUTPATIENT
Start: 2023-08-31 | End: 2023-08-31

## 2023-08-31 RX ADMIN — TRAMADOL HYDROCHLORIDE 50 MILLIGRAM(S): 50 TABLET ORAL at 06:49

## 2023-08-31 RX ADMIN — Medication 20 MILLIGRAM(S): at 06:48

## 2023-08-31 RX ADMIN — Medication 12.5 MILLIGRAM(S): at 17:49

## 2023-08-31 RX ADMIN — APIXABAN 2.5 MILLIGRAM(S): 2.5 TABLET, FILM COATED ORAL at 06:47

## 2023-08-31 RX ADMIN — TRAMADOL HYDROCHLORIDE 50 MILLIGRAM(S): 50 TABLET ORAL at 21:14

## 2023-08-31 RX ADMIN — TRAMADOL HYDROCHLORIDE 50 MILLIGRAM(S): 50 TABLET ORAL at 22:10

## 2023-08-31 RX ADMIN — TRAMADOL HYDROCHLORIDE 50 MILLIGRAM(S): 50 TABLET ORAL at 14:47

## 2023-08-31 RX ADMIN — PREGABALIN 1000 MICROGRAM(S): 225 CAPSULE ORAL at 12:00

## 2023-08-31 RX ADMIN — TRAMADOL HYDROCHLORIDE 50 MILLIGRAM(S): 50 TABLET ORAL at 07:19

## 2023-08-31 RX ADMIN — TRAMADOL HYDROCHLORIDE 50 MILLIGRAM(S): 50 TABLET ORAL at 13:52

## 2023-08-31 RX ADMIN — Medication 1 TABLET(S): at 12:00

## 2023-08-31 RX ADMIN — Medication 25 MILLIGRAM(S): at 06:47

## 2023-08-31 RX ADMIN — APIXABAN 2.5 MILLIGRAM(S): 2.5 TABLET, FILM COATED ORAL at 16:30

## 2023-08-31 NOTE — DIETITIAN INITIAL EVALUATION ADULT - ADD RECOMMEND
1. Add Ensure Plus High Protein 8oz PO BID (Provides 700kcal & 40grams of Protein)   2. Monitor daily PO intakes, GI tolerance, labs, weights, skin integrity, & BM regularity

## 2023-08-31 NOTE — DIETITIAN INITIAL EVALUATION ADULT - OTHER INFO
Patient note with fairly good PO intakes, consuming % of meals as per nursing documentation. CHF + Lasix noted. Recommend low sodium diet. No BM noted. Will provide fresh fruit with meals to promote BM regularity. No edema or pressure injuries noted. Continues with MVI & B12 supplements. NFPE indicated moderate protein calorie malnutrition as evidenced by muscle depletion and subcutaneous fat loss.

## 2023-08-31 NOTE — DIETITIAN INITIAL EVALUATION ADULT - ORAL INTAKE PTA/DIET HISTORY
Unable to obtain diet Hx from patient. Interview conducted with patients daughter via telephone who reports patient with variable appetite and unintentional weight loss within the last year. No known food allergies/intolerances. No issues with chewing/swallowing. Patient requires some minimal assistance with meals.

## 2023-08-31 NOTE — PROGRESS NOTE ADULT - ASSESSMENT
88 year old F PMH dementia, chronic a fib (on Eliquis), diastolic CHF admitted for COVID -19 infection.    #COVID-19   - Maintain on airborne isolation  - Obtain daily room air O2 saturations  - No need to start Remdesivir IV or Decadron as not hypoxic  - Acetaminophen 650 mg PO PRN fever. Limit use of NSAIDs.  - Goals of Care discussion had with patient/surrogate: DNR/DNI    #Chronic CHFpEF  - appears clinically euvolemic  - continue lasix 20 daily, metoprolol    #HTN  - chronic condition  - continue metoprolol    #Chronic Atrial Fibrillation  - rate controlled with toprol  - continue eliqius for AC    #DVT PPx   - on eliquis    8/30 discussed with daughter Silvia: [phone], updated on clinical status and all questions answered. Patient unable to return to [facility] due to COVID status and auth denied for CHAN. As per [facility]- can quarantine for 5 days and return, dc on 9/4 88 year old F PMH dementia, chronic a fib (on Eliquis), diastolic CHF admitted for COVID -19 infection.    #COVID-19   - Maintain on airborne isolation  - Obtain daily room air O2 saturations  - No need to start Remdesivir IV or Decadron as not hypoxic  - Acetaminophen 650 mg PO PRN fever. Limit use of NSAIDs.  - Goals of Care discussion had with patient/surrogate: DNR/DNI    #Chronic CHFpEF  - appears clinically euvolemic  - continue lasix 20 daily, metoprolol    #HTN  - chronic condition  - continue metoprolol    #Chronic Atrial Fibrillation  - rate controlled with toprol  - continue eliqius for AC    #DVT PPx   - on eliquis    8/30 discussed with daughter Silvia: [phone], updated on clinical status and all questions answered. Patient unable to return to [facility] due to COVID status and auth denied for CHAN. As per [facility]- can quarantine for 5 days and return, dc on 9/4 88 year old F PMH dementia, chronic a fib (on Eliquis), diastolic CHF admitted for COVID -19 infection.    #COVID-19   - Maintain on airborne isolation  - Obtain daily room air O2 saturations  - No need to start Remdesivir IV or Decadron as not hypoxic  - Acetaminophen 650 mg PO PRN fever. Limit use of NSAIDs.  - Goals of Care discussion had with patient/surrogate: DNR/DNI    #Chronic CHFpEF  - appears clinically euvolemic  - continue lasix 20 daily, metoprolol    #HTN  - chronic condition  - continue metoprolol    #Chronic Atrial Fibrillation  - rate controlled with toprol  - continue eliqius for AC    #DVT PPx   - on eliquis    8/30 discussed with daughter Silvia: [phone], updated on clinical status and all questions answered. Patient unable to return to [facility] due to COVID status and auth denied for CHAN. As per [facility]- can quarantine for 5 days and return, dc on 9/4

## 2023-08-31 NOTE — DIETITIAN NUTRITION RISK NOTIFICATION - TREATMENT: THE FOLLOWING DIET HAS BEEN RECOMMENDED
Diet, Regular:   Low Sodium  Supplement Feeding Modality:  Oral  Ensure Plus High Protein Cans or Servings Per Day:  1       Frequency:  Two Times a day (08-31-23 @ 11:16) [Pending Verification By Attending]  Diet, Regular (08-29-23 @ 10:09) [Active]

## 2023-08-31 NOTE — DIETITIAN INITIAL EVALUATION ADULT - PERTINENT LABORATORY DATA
08-29    138  |  103  |  18  ----------------------------<  102<H>  4.0   |  29  |  0.71    Ca    9.3      29 Aug 2023 17:20    TPro  7.4  /  Alb  3.6  /  TBili  0.6  /  DBili  x   /  AST  17  /  ALT  17  /  AlkPhos  69  08-29

## 2023-08-31 NOTE — DIETITIAN INITIAL EVALUATION ADULT - PERTINENT MEDS FT
MEDICATIONS  (STANDING):  apixaban 2.5 milliGRAM(s) Oral two times a day  cyanocobalamin 1000 MICROGram(s) Oral daily  furosemide    Tablet 20 milliGRAM(s) Oral daily  metoprolol succinate ER 25 milliGRAM(s) Oral daily  multivitamin 1 Tablet(s) Oral daily  traMADol 50 milliGRAM(s) Oral three times a day    MEDICATIONS  (PRN):  acetaminophen     Tablet .. 650 milliGRAM(s) Oral every 6 hours PRN Mild Pain (1 - 3)  OLANZapine 2.5 milliGRAM(s) Oral at bedtime PRN agitation

## 2023-08-31 NOTE — PROGRESS NOTE ADULT - SUBJECTIVE AND OBJECTIVE BOX
CC: Patient is a 88y old  Female who presents with a chief complaint of COVID-19 (31 Aug 2023 06:47)      Interval History:  Patient seen and examined at bedside.  No overnight events  No complaints this morning    ALLERGIES:  penicillin (Unknown)    MEDICATIONS  (STANDING):  apixaban 2.5 milliGRAM(s) Oral two times a day  cyanocobalamin 1000 MICROGram(s) Oral daily  furosemide    Tablet 20 milliGRAM(s) Oral daily  metoprolol succinate ER 25 milliGRAM(s) Oral daily  multivitamin 1 Tablet(s) Oral daily  traMADol 50 milliGRAM(s) Oral three times a day    MEDICATIONS  (PRN):  acetaminophen     Tablet .. 650 milliGRAM(s) Oral every 6 hours PRN Mild Pain (1 - 3)  OLANZapine 2.5 milliGRAM(s) Oral at bedtime PRN agitation    Vital Signs Last 24 Hrs  T(F): 97.4 (31 Aug 2023 06:37), Max: 97.8 (30 Aug 2023 16:49)  HR: 81 (31 Aug 2023 06:37) (71 - 81)  BP: 120/60 (31 Aug 2023 06:37) (119/69 - 120/60)  RR: 18 (31 Aug 2023 06:37) (18 - 19)  SpO2: 96% (31 Aug 2023 06:37) (96% - 100%)  I&O's Summary    30 Aug 2023 07:01  -  31 Aug 2023 07:00  --------------------------------------------------------  IN: 520 mL / OUT: 0 mL / NET: 520 mL      BMI (kg/m2): 31.4 (08-29-23 @ 09:40)    PHYSICAL EXAM:  GENERAL: NAD  NERVOUS SYSTEM:  CN II - XII intact; Sensation intact; follows commands  CHEST/LUNG: Clear to percussion bilaterally; No rales, rhonchi, wheezing, or rubs; normal respiratory effort, no intercostal retractions  HEART: Regular rate and rhythm; No murmurs, rubs, or gallops; No pitting edema  ABDOMEN: Soft, Nontender, Nondistended; Bowel sounds present; No HSM or masses  MUSCULOSKELETAL/EXTREMITIES:  2+ Peripheral Pulses, No clubbing or digital cyanosis; FROM of extremeties (pain, crepitation or contracture)  PSYCH: Appropriate affect, Alert & Oriented x 3, Good Memory; Good insight    LABS:                        12.0   6.59  )-----------( 210      ( 29 Aug 2023 17:20 )             36.6       08-29    138  |  103  |  18  ----------------------------<  102  4.0   |  29  |  0.71    Ca    9.3      29 Aug 2023 17:20    TPro  7.4  /  Alb  3.6  /  TBili  0.6  /  DBili  x   /  AST  17  /  ALT  17  /  AlkPhos  69  08-29     Urinalysis Basic - ( 29 Aug 2023 17:20 )  Color: x / Appearance: x / SG: x / pH: x  Gluc: 102 mg/dL / Ketone: x  / Bili: x / Urobili: x   Blood: x / Protein: x / Nitrite: x   Leuk Esterase: x / RBC: x / WBC x   Sq Epi: x / Non Sq Epi: x / Bacteria: x    Care Discussed with Consultants/Other Providers: Yes

## 2023-09-01 PROCEDURE — 99232 SBSQ HOSP IP/OBS MODERATE 35: CPT

## 2023-09-01 RX ADMIN — APIXABAN 2.5 MILLIGRAM(S): 2.5 TABLET, FILM COATED ORAL at 17:04

## 2023-09-01 RX ADMIN — APIXABAN 2.5 MILLIGRAM(S): 2.5 TABLET, FILM COATED ORAL at 06:55

## 2023-09-01 RX ADMIN — Medication 20 MILLIGRAM(S): at 06:56

## 2023-09-01 RX ADMIN — TRAMADOL HYDROCHLORIDE 50 MILLIGRAM(S): 50 TABLET ORAL at 13:35

## 2023-09-01 RX ADMIN — TRAMADOL HYDROCHLORIDE 50 MILLIGRAM(S): 50 TABLET ORAL at 14:35

## 2023-09-01 RX ADMIN — Medication 25 MILLIGRAM(S): at 06:57

## 2023-09-01 RX ADMIN — PREGABALIN 1000 MICROGRAM(S): 225 CAPSULE ORAL at 13:35

## 2023-09-01 RX ADMIN — TRAMADOL HYDROCHLORIDE 50 MILLIGRAM(S): 50 TABLET ORAL at 07:55

## 2023-09-01 RX ADMIN — Medication 1 TABLET(S): at 13:35

## 2023-09-01 RX ADMIN — TRAMADOL HYDROCHLORIDE 50 MILLIGRAM(S): 50 TABLET ORAL at 06:55

## 2023-09-01 NOTE — PROGRESS NOTE ADULT - ASSESSMENT
88 year old F PMH dementia, chronic a fib (on Eliquis), diastolic CHF admitted for COVID -19 infection.    #COVID-19   - Maintain on airborne isolation (Day 3/5)  - Obtain daily room air O2 saturations  - No need to start Remdesivir IV or Decadron as not hypoxic  - Acetaminophen 650 mg PO PRN fever. Limit use of NSAIDs.  - Goals of Care discussion had with patient/surrogate: DNR/DNI    #Chronic CHFpEF  - appears clinically euvolemic  - continue lasix 20 daily, metoprolol    #HTN  - chronic condition  - continue metoprolol    #Chronic Atrial Fibrillation  - rate controlled with toprol  - continue eliqius for AC    #DVT PPx   - on eliquis    daughter Silvia: [phone]  Patient unable to return to [facility] due to COVID status and auth denied for CHAN. As per [facility]- can quarantine for 5 days and return, dc on 9/4 88 year old F PMH dementia, chronic a fib (on Eliquis), diastolic CHF admitted for COVID -19 infection.    #COVID-19   - Maintain on airborne isolation (Day 3/5)  - Obtain daily room air O2 saturations  - No need to start Remdesivir IV or Decadron as not hypoxic  - Acetaminophen 650 mg PO PRN fever. Limit use of NSAIDs.  - Goals of Care discussion had with patient/surrogate: DNR/DNI    #Chronic CHFpEF  - appears clinically euvolemic  - continue lasix 20 daily, metoprolol    #HTN  - chronic condition  - continue metoprolol    #Chronic Atrial Fibrillation  - rate controlled with toprol  - continue eliqius for AC    #DVT PPx   - on eliquis    daughter Silvia: [phone]  Patient unable to return to [facility] due to COVID status and auth denied for CHAN. As per [facility]- can quarantine for 5 days and return, dc on 9/4 88 year old F PMH dementia, chronic a fib (on Eliquis), diastolic CHF admitted for COVID -19 infection.    #COVID-19   - Maintain on airborne isolation (Day 3/5)  - Obtain daily room air O2 saturations  - No need to start Remdesivir IV or Decadron as not hypoxic  - Acetaminophen 650 mg PO PRN fever. Limit use of NSAIDs.  - Goals of Care discussion had with patient/surrogate: DNR/DNI    #Chronic CHFpEF  - appears clinically euvolemic  - continue lasix 20 daily, metoprolol    #HTN  - chronic condition  - continue metoprolol    #Chronic Atrial Fibrillation  - rate controlled with toprol  - continue eliqius for AC    #DVT PPx   - on eliquis    daughter Silvia: [phone]  Patient unable to return to [facility] due to COVID status and auth denied for CHAN. As per [facility]- can quarantine for 5 days and return, dc on 9/4 88 year old F PMH dementia, chronic a fib (on Eliquis), diastolic CHF admitted for COVID -19 infection.    #COVID-19   - Maintain on airborne isolation (Day 3/5)  - Obtain daily room air O2 saturations  - No need to start Remdesivir IV or Decadron as not hypoxic  - Acetaminophen 650 mg PO PRN fever. Limit use of NSAIDs.  - Goals of Care discussion had with patient/surrogate: DNR/DNI    #Chronic CHFpEF  - appears clinically euvolemic  - continue lasix 20 daily, metoprolol    #HTN  - chronic condition  - continue metoprolol    #Chronic Atrial Fibrillation  - rate controlled with toprol  - continue eliqius for AC    #DVT PPx   - on eliquis    daughter Silvia: [phone], updated 9/1  Patient unable to return to [facility] due to COVID status and auth denied for CHAN. As per [facility]- can quarantine for 5 days and return, dc on 9/4 88 year old F PMH dementia, chronic a fib (on Eliquis), diastolic CHF admitted for COVID -19 infection.    #COVID-19   - Maintain on airborne isolation (Day 3/5)  - Obtain daily room air O2 saturations  - No need to start Remdesivir IV or Decadron as not hypoxic  - Acetaminophen 650 mg PO PRN fever. Limit use of NSAIDs.  - Goals of Care discussion had with patient/surrogate: DNR/DNI    #Chronic CHFpEF  - appears clinically euvolemic  - continue lasix 20 daily, metoprolol    #HTN  - chronic condition  - continue metoprolol    #Chronic Atrial Fibrillation  - rate controlled with toprol  - continue eliqius for AC    #DVT PPx   - on eliquis    daughter Silvia: [phone], updated 9/1  Patient unable to return to [facility] due to COVID status and auth denied for CHAN. As per [facility]- can quarantine for 5 days and return, dc on 9/4 88 year old F PMH dementia, chronic a fib (on Eliquis), diastolic CHF admitted for COVID -19 infection.    #COVID-19   - Maintain on airborne isolation (Day 3/5)  - Obtain daily room air O2 saturations  - No need to start Remdesivir IV or Decadron as not hypoxic  - Acetaminophen 650 mg PO PRN fever. Limit use of NSAIDs.  - Goals of Care discussion had with patient/surrogate: DNR/DNI    #Chronic CHFpEF  - appears clinically euvolemic  - continue lasix 20 daily, metoprolol    #HTN  - chronic condition  - continue metoprolol    #Chronic Atrial Fibrillation  - rate controlled with toprol  - continue eliqius for AC    #DVT PPx   - on eliquis    daughter Silvia: [phone], updated 9/1  Patient unable to return to [facility] due to COVID status and auth denied for CHAN. As per [facility]- can quarantine for 5 days and return, dc on 9/4

## 2023-09-01 NOTE — PROGRESS NOTE ADULT - SUBJECTIVE AND OBJECTIVE BOX
CC: Patient is a 88y old  Female who presents with a chief complaint of COVID-19 (01 Sep 2023 07:21)      Interval History:  Patient seen and examined at bedside.  No overnight events  No complaints this morning. Confused but re-directable     ALLERGIES:  penicillin (Unknown)    MEDICATIONS  (STANDING):  apixaban 2.5 milliGRAM(s) Oral two times a day  cyanocobalamin 1000 MICROGram(s) Oral daily  furosemide    Tablet 20 milliGRAM(s) Oral daily  metoprolol succinate ER 25 milliGRAM(s) Oral daily  multivitamin 1 Tablet(s) Oral daily  traMADol 50 milliGRAM(s) Oral three times a day    MEDICATIONS  (PRN):  acetaminophen     Tablet .. 650 milliGRAM(s) Oral every 6 hours PRN Mild Pain (1 - 3)  OLANZapine 2.5 milliGRAM(s) Oral at bedtime PRN agitation    Vital Signs Last 24 Hrs  T(F): 97.1 (01 Sep 2023 06:52), Max: 98.4 (31 Aug 2023 15:36)  HR: 99 (01 Sep 2023 06:52) (99 - 126)  BP: 132/76 (01 Sep 2023 06:52) (117/75 - 143/84)  RR: 17 (01 Sep 2023 06:52) (15 - 17)  SpO2: 97% (01 Sep 2023 06:52) (95% - 97%)  I&O's Summary    31 Aug 2023 07:01  -  01 Sep 2023 07:00  --------------------------------------------------------  IN: 360 mL / OUT: 0 mL / NET: 360 mL      BMI (kg/m2): 31.4 (08-29-23 @ 09:40)    PHYSICAL EXAM:  GENERAL: NAD  NERVOUS SYSTEM:  CN II - XII intact; Sensation intact; follows commands  CHEST/LUNG: Clear to percussion bilaterally; No rales, rhonchi, wheezing, or rubs; normal respiratory effort, no intercostal retractions  HEART: Regular rate and rhythm; No murmurs, rubs, or gallops; No pitting edema  ABDOMEN: Soft, Nontender, Nondistended; Bowel sounds present; No HSM or masses  MUSCULOSKELETAL/EXTREMITIES:  2+ Peripheral Pulses, No clubbing or digital cyanosis  PSYCH: Appropriate affect, Alert & Awake    LABS:                        12.0   6.59  )-----------( 210      ( 29 Aug 2023 17:20 )             36.6       08-29    138  |  103  |  18  ----------------------------<  102  4.0   |  29  |  0.71    Ca    9.3      29 Aug 2023 17:20    TPro  7.4  /  Alb  3.6  /  TBili  0.6  /  DBili  x   /  AST  17  /  ALT  17  /  AlkPhos  69  08-29     Urinalysis Basic - ( 29 Aug 2023 17:20 )    Color: x / Appearance: x / SG: x / pH: x  Gluc: 102 mg/dL / Ketone: x  / Bili: x / Urobili: x   Blood: x / Protein: x / Nitrite: x   Leuk Esterase: x / RBC: x / WBC x   Sq Epi: x / Non Sq Epi: x / Bacteria: x    Care Discussed with Consultants/Other Providers: Yes

## 2023-09-02 PROCEDURE — 99232 SBSQ HOSP IP/OBS MODERATE 35: CPT

## 2023-09-02 RX ADMIN — Medication 1 TABLET(S): at 13:05

## 2023-09-02 RX ADMIN — APIXABAN 2.5 MILLIGRAM(S): 2.5 TABLET, FILM COATED ORAL at 17:18

## 2023-09-02 RX ADMIN — Medication 20 MILLIGRAM(S): at 06:08

## 2023-09-02 RX ADMIN — Medication 25 MILLIGRAM(S): at 06:08

## 2023-09-02 RX ADMIN — TRAMADOL HYDROCHLORIDE 50 MILLIGRAM(S): 50 TABLET ORAL at 14:05

## 2023-09-02 RX ADMIN — APIXABAN 2.5 MILLIGRAM(S): 2.5 TABLET, FILM COATED ORAL at 06:07

## 2023-09-02 RX ADMIN — TRAMADOL HYDROCHLORIDE 50 MILLIGRAM(S): 50 TABLET ORAL at 21:48

## 2023-09-02 RX ADMIN — TRAMADOL HYDROCHLORIDE 50 MILLIGRAM(S): 50 TABLET ORAL at 22:39

## 2023-09-02 RX ADMIN — TRAMADOL HYDROCHLORIDE 50 MILLIGRAM(S): 50 TABLET ORAL at 07:05

## 2023-09-02 RX ADMIN — TRAMADOL HYDROCHLORIDE 50 MILLIGRAM(S): 50 TABLET ORAL at 13:05

## 2023-09-02 RX ADMIN — TRAMADOL HYDROCHLORIDE 50 MILLIGRAM(S): 50 TABLET ORAL at 06:07

## 2023-09-02 RX ADMIN — PREGABALIN 1000 MICROGRAM(S): 225 CAPSULE ORAL at 13:05

## 2023-09-02 NOTE — PROGRESS NOTE ADULT - ASSESSMENT
88 year old F PMH dementia, chronic a fib (on Eliquis), diastolic CHF admitted for COVID -19 infection.    #COVID-19 (stable)  - Maintain on airborne isolation (Day 4/5)  - Obtain daily room air O2 saturations  - No need to start Remdesivir IV or Decadron as not hypoxic  - Acetaminophen 650 mg PO PRN fever. Limit use of NSAIDs.  - Goals of Care discussion had with patient/surrogate: DNR/DNI    #Chronic CHFpEF  - appears clinically euvolemic  - continue lasix 20 daily, metoprolol    #HTN  - chronic condition  - continue metoprolol    #Chronic Atrial Fibrillation  - rate controlled with toprol  - continue eliqius for AC    #DVT PPx   - on eliquis    daughter Silvia: [phone], updated 9/1  Patient unable to return to [facility] due to COVID status and auth denied for CHAN. As per [facility]- can quarantine for 5 days and return, dc on 9/4 88 year old F PMH dementia, chronic a fib (on Eliquis), diastolic CHF admitted for COVID -19 infection.    #COVID-19 (stable)  - Maintain on airborne isolation (Day 4/5)  - Obtain daily room air O2 saturations  - No need to start Remdesivir IV or Decadron as not hypoxic  - Acetaminophen 650 mg PO PRN fever. Limit use of NSAIDs.  - Goals of Care discussion had with patient/surrogate: DNR/DNI    #Chronic CHFpEF  - appears clinically euvolemic  - continue lasix 20 daily, metoprolol    #HTN  - chronic condition  - continue metoprolol    #Chronic Atrial Fibrillation  - rate controlled with toprol  - continue eliqius for AC    #DVT PPx   - on eliquis    daughter Silvia: [phone], updated 9/1  Patient unable to return to [facility] due to COVID status and auth denied for CHAN. As per [facility]- can quarantine for 5 days and return, dc on 9/4 88 year old F PMH dementia, chronic a fib (on Eliquis), diastolic CHF admitted for COVID -19 infection.    #COVID-19 (stable)  - Maintain on airborne isolation (Day 4/5)  - Obtain daily room air O2 saturations  - No need to start Remdesivir IV or Decadron as not hypoxic  - Acetaminophen 650 mg PO PRN fever. Limit use of NSAIDs.  - Goals of Care discussion had with patient/surrogate: DNR/DNI    #Chronic CHFpEF  - appears clinically euvolemic  - continue lasix 20 daily, metoprolol    #HTN  - chronic condition  - continue metoprolol    #Chronic Atrial Fibrillation  - rate controlled with toprol  - continue eliqius for AC    #DVT PPx   - on eliquis    daughter Islvia: [phone], updated 9/1  Patient unable to return to [facility] due to COVID status and auth denied for CHAN. As per [facility]- can quarantine for 5 days and return, dc on 9/4

## 2023-09-02 NOTE — PROGRESS NOTE ADULT - SUBJECTIVE AND OBJECTIVE BOX
CC: Patient is a 88y old  Female who presents with a chief complaint of COVID-19 (02 Sep 2023 07:16)      Interval History:  Patient seen and examined at bedside.  No overnight events  No complaints this morning    ALLERGIES:  penicillin (Unknown)    MEDICATIONS  (STANDING):  apixaban 2.5 milliGRAM(s) Oral two times a day  cyanocobalamin 1000 MICROGram(s) Oral daily  furosemide    Tablet 20 milliGRAM(s) Oral daily  metoprolol succinate ER 25 milliGRAM(s) Oral daily  multivitamin 1 Tablet(s) Oral daily  traMADol 50 milliGRAM(s) Oral three times a day    MEDICATIONS  (PRN):  acetaminophen     Tablet .. 650 milliGRAM(s) Oral every 6 hours PRN Mild Pain (1 - 3)  OLANZapine 2.5 milliGRAM(s) Oral at bedtime PRN agitation    Vital Signs Last 24 Hrs  T(F): 98.2 (01 Sep 2023 20:51), Max: 98.2 (01 Sep 2023 17:06)  HR: 104 (01 Sep 2023 20:51) (100 - 104)  BP: 140/84 (01 Sep 2023 20:51) (140/84 - 146/70)  RR: 17 (01 Sep 2023 20:51) (15 - 17)  SpO2: 96% (01 Sep 2023 20:51) (96% - 97%)  I&O's Summary    01 Sep 2023 07:01  -  02 Sep 2023 07:00  --------------------------------------------------------  IN: 1120 mL / OUT: 0 mL / NET: 1120 mL      BMI (kg/m2): 26.2 (09-02-23 @ 05:16)    PHYSICAL EXAM:  GENERAL: NAD  NERVOUS SYSTEM:  CN II - XII intact; Sensation intact; follows commands  CHEST/LUNG: Clear to percussion bilaterally; No rales, rhonchi, wheezing, or rubs; normal respiratory effort, no intercostal retractions  HEART: Regular rate and rhythm; No murmurs, rubs, or gallops; No pitting edema  ABDOMEN: Soft, Nontender, Nondistended; Bowel sounds present; No HSM or masses  MUSCULOSKELETAL/EXTREMITIES:  2+ Peripheral Pulses, No clubbing or digital cyanosis  PSYCH: Appropriate affect, Alert & Awake    LABS:      Care Discussed with Consultants/Other Providers: Yes

## 2023-09-03 ENCOUNTER — TRANSCRIPTION ENCOUNTER (OUTPATIENT)
Age: 88
End: 2023-09-03

## 2023-09-03 LAB
ANION GAP SERPL CALC-SCNC: 4 MMOL/L — LOW (ref 5–17)
BUN SERPL-MCNC: 37 MG/DL — HIGH (ref 7–23)
CALCIUM SERPL-MCNC: 8.9 MG/DL — SIGNIFICANT CHANGE UP (ref 8.4–10.5)
CHLORIDE SERPL-SCNC: 109 MMOL/L — HIGH (ref 96–108)
CO2 SERPL-SCNC: 30 MMOL/L — SIGNIFICANT CHANGE UP (ref 22–31)
CREAT SERPL-MCNC: 0.63 MG/DL — SIGNIFICANT CHANGE UP (ref 0.5–1.3)
EGFR: 85 ML/MIN/1.73M2 — SIGNIFICANT CHANGE UP
GLUCOSE SERPL-MCNC: 100 MG/DL — HIGH (ref 70–99)
HCT VFR BLD CALC: 31.6 % — LOW (ref 34.5–45)
HGB BLD-MCNC: 10.2 G/DL — LOW (ref 11.5–15.5)
MCHC RBC-ENTMCNC: 29.6 PG — SIGNIFICANT CHANGE UP (ref 27–34)
MCHC RBC-ENTMCNC: 32.3 GM/DL — SIGNIFICANT CHANGE UP (ref 32–36)
MCV RBC AUTO: 91.6 FL — SIGNIFICANT CHANGE UP (ref 80–100)
NRBC # BLD: 0 /100 WBCS — SIGNIFICANT CHANGE UP (ref 0–0)
PLATELET # BLD AUTO: 168 K/UL — SIGNIFICANT CHANGE UP (ref 150–400)
POTASSIUM SERPL-MCNC: 4.2 MMOL/L — SIGNIFICANT CHANGE UP (ref 3.5–5.3)
POTASSIUM SERPL-SCNC: 4.2 MMOL/L — SIGNIFICANT CHANGE UP (ref 3.5–5.3)
RBC # BLD: 3.45 M/UL — LOW (ref 3.8–5.2)
RBC # FLD: 16.1 % — HIGH (ref 10.3–14.5)
SODIUM SERPL-SCNC: 143 MMOL/L — SIGNIFICANT CHANGE UP (ref 135–145)
WBC # BLD: 4.83 K/UL — SIGNIFICANT CHANGE UP (ref 3.8–10.5)
WBC # FLD AUTO: 4.83 K/UL — SIGNIFICANT CHANGE UP (ref 3.8–10.5)

## 2023-09-03 PROCEDURE — 99232 SBSQ HOSP IP/OBS MODERATE 35: CPT

## 2023-09-03 RX ADMIN — TRAMADOL HYDROCHLORIDE 50 MILLIGRAM(S): 50 TABLET ORAL at 22:00

## 2023-09-03 RX ADMIN — TRAMADOL HYDROCHLORIDE 50 MILLIGRAM(S): 50 TABLET ORAL at 21:07

## 2023-09-03 RX ADMIN — PREGABALIN 1000 MICROGRAM(S): 225 CAPSULE ORAL at 12:54

## 2023-09-03 RX ADMIN — Medication 1 TABLET(S): at 12:54

## 2023-09-03 RX ADMIN — APIXABAN 2.5 MILLIGRAM(S): 2.5 TABLET, FILM COATED ORAL at 17:58

## 2023-09-03 RX ADMIN — TRAMADOL HYDROCHLORIDE 50 MILLIGRAM(S): 50 TABLET ORAL at 07:30

## 2023-09-03 RX ADMIN — TRAMADOL HYDROCHLORIDE 50 MILLIGRAM(S): 50 TABLET ORAL at 13:30

## 2023-09-03 RX ADMIN — APIXABAN 2.5 MILLIGRAM(S): 2.5 TABLET, FILM COATED ORAL at 06:56

## 2023-09-03 RX ADMIN — TRAMADOL HYDROCHLORIDE 50 MILLIGRAM(S): 50 TABLET ORAL at 07:11

## 2023-09-03 RX ADMIN — TRAMADOL HYDROCHLORIDE 50 MILLIGRAM(S): 50 TABLET ORAL at 12:54

## 2023-09-03 NOTE — DISCHARGE NOTE PROVIDER - NSDCMRMEDTOKEN_GEN_ALL_CORE_FT
cyanocobalamin 1000 mcg oral tablet: 1 tab(s) orally once a day  Eliquis 2.5 mg oral tablet: 1 tab(s) orally 2 times a day  Lasix 20 mg oral tablet: 1 tab(s) orally once a day  Toprol-XL 25 mg oral tablet, extended release: 1 tab(s) orally once a day   acetaminophen 325 mg oral tablet: 2 tab(s) orally every 6 hours As needed Mild Pain (1 - 3)  cyanocobalamin 1000 mcg oral tablet: 1 tab(s) orally once a day  Eliquis 2.5 mg oral tablet: 1 tab(s) orally 2 times a day  Lasix 20 mg oral tablet: 1 tab(s) orally once a day  Multiple Vitamins oral tablet: 1 tab(s) orally once a day  Toprol-XL 25 mg oral tablet, extended release: 1 tab(s) orally once a day  traMADol 50 mg oral tablet: 1 tab(s) orally 3 times a day

## 2023-09-03 NOTE — DISCHARGE NOTE PROVIDER - CARE PROVIDER_API CALL
Cas Dinh  Pulmonary Disease  [address] [place] [address]  Phone: [phone]  Fax: [phone]  Follow Up Time:    Cas Dinh  Pulmonary Disease  [address] [place] [address]  Phone: [phone]  Fax: [phone]  Follow Up Time:    Cas Dinh  Pulmonary Disease  [address] [place] [address]  Phone: [phone]  Fax: [phone]  Follow Up Time:

## 2023-09-03 NOTE — DISCHARGE NOTE NURSING/CASE MANAGEMENT/SOCIAL WORK - PATIENT PORTAL LINK FT
You can access the FollowMyHealth Patient Portal offered by [facility] by registering at the following website: [email or web address]. By joining [employer]’s FollowMyHealth portal, you will also be able to view your health information using other applications (apps) compatible with our system. You can access the FollowMyHealth Patient Portal offered by [facility] by registering at the following website: [email or web address]. By joining [employer]’s FollowMyHealth portal, you will also be able to view your health information using other applications (apps) compatible with our system. You can access the FollowMyHealth Patient Portal offered by [facility] by registering at the following website: [email or web address]. By joining [employer]’s FollowMyHealth portal, you will also be able to view your health information using other applications (apps) compatible with our system.

## 2023-09-03 NOTE — DISCHARGE NOTE PROVIDER - NSDCCPCAREPLAN_GEN_ALL_CORE_FT
PRINCIPAL DISCHARGE DIAGNOSIS  Diagnosis: Viral illness  Assessment and Plan of Treatment: You have COVID. You were in isolation for 5 days. You had no symptoms. Continue your same home medications

## 2023-09-03 NOTE — DISCHARGE NOTE PROVIDER - PROVIDER TOKENS
PROVIDER:[TOKEN:[[id number]]] PROVIDER:[TOKEN:[[id number]]] PROVIDER:[TOKEN:[[id number]]]

## 2023-09-03 NOTE — DISCHARGE NOTE PROVIDER - CARE PROVIDERS DIRECT ADDRESSES
[email or web address]

## 2023-09-03 NOTE — DISCHARGE NOTE PROVIDER - HOSPITAL COURSE
Hospital Course  HPI:  88F with PMH dementia, chronic a fib (on Eliquis), diastolic CHF brought in by EMS from [facility] for cough and sore throat. Patient has Dementia and was not able to contribute to the history. She states she has no complaints. As per ED, patient is COVID positive and cannot be sent back to her housing. Patient is for placement to [facility].     (29 Aug 2023 16:53)   Patient was placed on isolation for 5 days. VS was stable. labs WNL. Stable to return with no isolation.    You were admitted for COVID-19 infection    You will need to follow up with your primary care physician.    Discharging Provider:  Lady Farooq MD  Contact Info: Cell [phone] - Please call with any questions or concerns.    Outpatient Provider: Dr. Pricilla Dinh       Hospital Course  HPI:  88F with PMH dementia, chronic a fib (on Eliquis), diastolic CHF brought in by EMS from [facility] for cough and sore throat. Patient has Dementia and was not able to contribute to the history. She states she has no complaints. As per ED, patient is COVID positive and cannot be sent back to her housing. Patient is for placement to [facility].     (29 Aug 2023 16:53)   Patient was placed on isolation for 5 days. VS was stable. labs WNL. Stable to return with no isolation.    You were admitted for COVID-19 infection    You will need to follow up with your primary care physician.    Discharging Provider:  Lady Farooq MD  Contact Info: Cell [phone] - Please call with any questions or concerns.    Outpatient Provider: Dr. Pricilla Dinh       Hospital Course  HPI:  88F with PMH dementia, chronic a fib (on Eliquis), diastolic CHF brought in by EMS from [facility] for cough and sore throat. Patient has Dementia and was not able to contribute to the history. She states she has no complaints. As per ED, patient is COVID positive and cannot be sent back to her housing. Patient is for placement to [facility].     (29 Aug 2023 16:53)   Patient was placed on isolation for 5 days. VS was stable. labs WNL. Stable to return with no isolation.    You were admitted for COVID-19 infection    You will need to follow up with your primary care physician.    Discharging Provider:  Lady Farooq MD  Contact Info: Cell [phone] - Please call with any questions or concerns.    Outpatient Provider: Dr. Pricilla Dinh

## 2023-09-03 NOTE — DISCHARGE NOTE NURSING/CASE MANAGEMENT/SOCIAL WORK - NSDCPEFALRISK_GEN_ALL_CORE
For information on Fall & Injury Prevention, visit: [email or web address]/[email or web address]-[email or web address]-mobility OR  [email or web address]://[email or web address].[email or web address]/[email or web address]-prevention-tips-[email or web address]-avoid-injury OR  https://www.cdc.gov/steadi/patient.html For information on Fall & Injury Prevention, visit: https://www.[facility]/news/fall-prevention-protects-and-maintains-health-and-mobility OR  https://www.[facility]/news/fall-prevention-tips-to-avoid-injury OR  https://www.cdc.gov/steadi/patient.html For information on Fall & Injury Prevention, visit: https://www.[facility]/news/fall-prevention-protects-and-maintains-health-and-mobility OR  https://www.[facility]/news/fall-prevention-tips-to-avoid-injury OR  https://www.cdc.gov/steadi/patient.html

## 2023-09-03 NOTE — PROGRESS NOTE ADULT - SUBJECTIVE AND OBJECTIVE BOX
CC: Patient is a 88y old  Female who presents with a chief complaint of COVID-19 (02 Sep 2023 07:16)      Interval History:  Patient seen and examined at bedside.  No overnight events  No complaints this morning    ALLERGIES:  penicillin (Unknown)    MEDICATIONS  (STANDING):  apixaban 2.5 milliGRAM(s) Oral two times a day  cyanocobalamin 1000 MICROGram(s) Oral daily  furosemide    Tablet 20 milliGRAM(s) Oral daily  metoprolol succinate ER 25 milliGRAM(s) Oral daily  multivitamin 1 Tablet(s) Oral daily  traMADol 50 milliGRAM(s) Oral three times a day    MEDICATIONS  (PRN):  acetaminophen     Tablet .. 650 milliGRAM(s) Oral every 6 hours PRN Mild Pain (1 - 3)  OLANZapine 2.5 milliGRAM(s) Oral at bedtime PRN agitation    Vital Signs Last 24 Hrs  T(F): 98.1 (02 Sep 2023 19:30), Max: 98.1 (02 Sep 2023 19:30)  HR: 79 (03 Sep 2023 06:55) (79 - 107)  BP: 114/57 (03 Sep 2023 06:55) (114/57 - 141/87)  RR: 18 (03 Sep 2023 06:55) (18 - 19)  SpO2: 95% (03 Sep 2023 06:55) (95% - 96%)  I&O's Summary    03 Sep 2023 07:01  -  03 Sep 2023 09:56  --------------------------------------------------------  IN: 660 mL / OUT: 0 mL / NET: 660 mL    PHYSICAL EXAM:  GENERAL: NAD  NERVOUS SYSTEM:  CN II - XII intact; Sensation intact; follows commands  CHEST/LUNG: Clear to percussion bilaterally; No rales, rhonchi, wheezing, or rubs; normal respiratory effort, no intercostal retractions  HEART: Regular rate and rhythm; No murmurs, rubs, or gallops; No pitting edema  ABDOMEN: Soft, Nontender, Nondistended; Bowel sounds present; No HSM or masses  MUSCULOSKELETAL/EXTREMITIES:  2+ Peripheral Pulses, No clubbing or digital cyanosis; FROM of extremities  PSYCH: Appropriate affect, Alert & Awake    LABS:                        10.2   4.83  )-----------( 168      ( 03 Sep 2023 08:38 )             31.6       09-03    143  |  109  |  37  ----------------------------<  100  4.2   |  30  |  0.63    Ca    8.9      03 Sep 2023 08:38    Urinalysis Basic - ( 03 Sep 2023 08:38 )  Color: x / Appearance: x / SG: x / pH: x  Gluc: 100 mg/dL / Ketone: x  / Bili: x / Urobili: x   Blood: x / Protein: x / Nitrite: x   Leuk Esterase: x / RBC: x / WBC x   Sq Epi: x / Non Sq Epi: x / Bacteria: x    Care Discussed with Consultants/Other Providers: Yes

## 2023-09-03 NOTE — PROGRESS NOTE ADULT - ASSESSMENT
88 year old F PMH dementia, chronic a fib (on Eliquis), diastolic CHF admitted for COVID -19 infection.    #COVID-19 (stable)  - Maintain on airborne isolation (Day 4/5)  - Obtain daily room air O2 saturations  - No need to start Remdesivir IV or Decadron as not hypoxic  - Acetaminophen 650 mg PO PRN fever. Limit use of NSAIDs.  - Goals of Care discussion had with patient/surrogate: DNR/DNI    #Chronic CHFpEF  - appears clinically euvolemic  - continue lasix 20 daily, metoprolol    #HTN  - chronic condition  - continue metoprolol    #Chronic Atrial Fibrillation  - rate controlled with toprol  - continue eliqius for AC    #DVT PPx   - on eliquis    daughter Silvia: [phone], updated 9/1  Patient unable to return to [facility] due to COVID status and auth denied for CHAN. As per [facility]- can quarantine for 5 days and return, dc on 9/4    Patient stable to return tomorrow to [facility] 88 year old F PMH dementia, chronic a fib (on Eliquis), diastolic CHF admitted for COVID -19 infection.    #COVID-19 (stable)  - Maintain on airborne isolation (Day 4/5)  - Obtain daily room air O2 saturations  - No need to start Remdesivir IV or Decadron as not hypoxic  - Acetaminophen 650 mg PO PRN fever. Limit use of NSAIDs.  - Goals of Care discussion had with patient/surrogate: DNR/DNI    #Chronic CHFpEF  - appears clinically euvolemic  - continue lasix 20 daily, metoprolol    #HTN  - chronic condition  - continue metoprolol    #Chronic Atrial Fibrillation  - rate controlled with toprol  - continue eliqius for AC    #DVT PPx   - on eliquis    daughter Silvia: [phone], updated 9/1  Patient unable to return to [facility] due to COVID status and auth denied for CHAN. As per [facility]- can quarantine for 5 days and return, dc on 9/4    Patient stable to return tomorrow to [facility] 88 year old F PMH dementia, chronic a fib (on Eliquis), diastolic CHF admitted for COVID -19 infection.    #COVID-19 (stable)  - Maintain on airborne isolation (Day 4/5)  - Obtain daily room air O2 saturations  - No need to start Remdesivir IV or Decadron as not hypoxic  - Acetaminophen 650 mg PO PRN fever. Limit use of NSAIDs.  - Goals of Care discussion had with patient/surrogate: DNR/DNI    #Chronic CHFpEF  - appears clinically euvolemic  - continue lasix 20 daily, metoprolol    #HTN  - chronic condition  - continue metoprolol    #Chronic Atrial Fibrillation  - rate controlled with toprol  - continue eliqius for AC    #DVT PPx   - on eliquis    daughter Silvia: [phone], updated 9/1  Patient unable to return to [facility] due to COVID status and auth denied for CHAN. As per [facility]- can quarantine for 5 days and return, dc on 9/4    Patient stable to return tomorrow to [facility]

## 2023-09-03 NOTE — DISCHARGE NOTE PROVIDER - DETAILS OF MALNUTRITION DIAGNOSIS/DIAGNOSES
This patient has been assessed with a concern for Malnutrition and was treated during this hospitalization for the following Nutrition diagnosis/diagnoses:     -  08/31/2023: Moderate protein-calorie malnutrition

## 2023-09-04 VITALS
OXYGEN SATURATION: 95 % | SYSTOLIC BLOOD PRESSURE: 121 MMHG | RESPIRATION RATE: 18 BRPM | DIASTOLIC BLOOD PRESSURE: 64 MMHG | TEMPERATURE: 98 F | HEART RATE: 83 BPM

## 2023-09-04 PROCEDURE — 97162 PT EVAL MOD COMPLEX 30 MIN: CPT

## 2023-09-04 PROCEDURE — 71045 X-RAY EXAM CHEST 1 VIEW: CPT

## 2023-09-04 PROCEDURE — 36415 COLL VENOUS BLD VENIPUNCTURE: CPT

## 2023-09-04 PROCEDURE — 93005 ELECTROCARDIOGRAM TRACING: CPT

## 2023-09-04 PROCEDURE — 80053 COMPREHEN METABOLIC PANEL: CPT

## 2023-09-04 PROCEDURE — 85027 COMPLETE CBC AUTOMATED: CPT

## 2023-09-04 PROCEDURE — 80048 BASIC METABOLIC PNL TOTAL CA: CPT

## 2023-09-04 PROCEDURE — 99239 HOSP IP/OBS DSCHRG MGMT >30: CPT

## 2023-09-04 PROCEDURE — 85025 COMPLETE CBC W/AUTO DIFF WBC: CPT

## 2023-09-04 PROCEDURE — 87637 SARSCOV2&INF A&B&RSV AMP PRB: CPT

## 2023-09-04 PROCEDURE — 99285 EMERGENCY DEPT VISIT HI MDM: CPT

## 2023-09-04 RX ORDER — ACETAMINOPHEN 500 MG
2 TABLET ORAL
Qty: 0 | Refills: 0 | DISCHARGE
Start: 2023-09-04

## 2023-09-04 RX ORDER — TRAMADOL HYDROCHLORIDE 50 MG/1
1 TABLET ORAL
Qty: 0 | Refills: 0 | DISCHARGE
Start: 2023-09-04

## 2023-09-04 RX ADMIN — Medication 25 MILLIGRAM(S): at 06:33

## 2023-09-04 RX ADMIN — TRAMADOL HYDROCHLORIDE 50 MILLIGRAM(S): 50 TABLET ORAL at 06:33

## 2023-09-04 RX ADMIN — Medication 20 MILLIGRAM(S): at 06:33

## 2023-09-04 RX ADMIN — APIXABAN 2.5 MILLIGRAM(S): 2.5 TABLET, FILM COATED ORAL at 06:33

## 2023-09-04 RX ADMIN — TRAMADOL HYDROCHLORIDE 50 MILLIGRAM(S): 50 TABLET ORAL at 07:29

## 2023-09-04 NOTE — PROGRESS NOTE ADULT - ASSESSMENT
88 year old F PMH dementia, chronic a fib (on Eliquis), diastolic CHF admitted for COVID -19 infection.    #COVID-19 (stable)  - Maintain on airborne isolation (Day 4/5)  - Obtain daily room air O2 saturations  - No need to start Remdesivir IV or Decadron as not hypoxic  - Acetaminophen 650 mg PO PRN fever. Limit use of NSAIDs.  - Goals of Care discussion had with patient/surrogate: DNR/DNI    #Chronic CHFpEF  - appears clinically euvolemic  - continue lasix 20 daily, metoprolol    #HTN  - chronic condition  - continue metoprolol    #Chronic Atrial Fibrillation  - rate controlled with toprol  - continue eliqius for AC    #DVT PPx   - on eliquis    daughter Silvia: [phone], updated 9/1  Patient unable to return to [facility] due to COVID status and auth denied for CHAN. As per [facility]- can quarantine for 5 days and return, dc on 9/4    Patient stable to return today to [facility] 88 year old F PMH dementia, chronic a fib (on Eliquis), diastolic CHF admitted for COVID -19 infection.    #COVID-19 (stable)  - Maintain on airborne isolation (Day 4/5)  - Obtain daily room air O2 saturations  - No need to start Remdesivir IV or Decadron as not hypoxic  - Acetaminophen 650 mg PO PRN fever. Limit use of NSAIDs.  - Goals of Care discussion had with patient/surrogate: DNR/DNI    #Chronic CHFpEF  - appears clinically euvolemic  - continue lasix 20 daily, metoprolol    #HTN  - chronic condition  - continue metoprolol    #Chronic Atrial Fibrillation  - rate controlled with toprol  - continue eliqius for AC    #DVT PPx   - on eliquis    daughter Silvia: [phone], updated 9/1  Patient unable to return to [facility] due to COVID status and auth denied for CHAN. As per [facility]- can quarantine for 5 days and return, dc on 9/4    Patient stable to return today to [facility] 88 year old F PMH dementia, chronic a fib (on Eliquis), diastolic CHF admitted for COVID -19 infection.    #COVID-19 (stable)  - Maintain on airborne isolation (Day 4/5)  - Obtain daily room air O2 saturations  - No need to start Remdesivir IV or Decadron as not hypoxic  - Acetaminophen 650 mg PO PRN fever. Limit use of NSAIDs.  - Goals of Care discussion had with patient/surrogate: DNR/DNI    #Chronic CHFpEF  - appears clinically euvolemic  - continue lasix 20 daily, metoprolol    #HTN  - chronic condition  - continue metoprolol    #Chronic Atrial Fibrillation  - rate controlled with toprol  - continue eliqius for AC    #DVT PPx   - on eliquis    daughter Silvia: [phone], updated 9/1  Patient unable to return to [facility] due to COVID status and auth denied for CHAN. As per [facility]- can quarantine for 5 days and return, dc on 9/4    Patient stable to return today to [facility]

## 2023-09-04 NOTE — PROGRESS NOTE ADULT - NUTRITIONAL ASSESSMENT
This patient has been assessed with a concern for Malnutrition and has been determined to have a diagnosis/diagnoses of Moderate protein-calorie malnutrition.    This patient is being managed with:   Diet Regular-  Low Sodium  Supplement Feeding Modality:  Oral  Ensure Plus High Protein Cans or Servings Per Day:  1       Frequency:  Two Times a day  Entered: Aug 31 2023 11:15AM    Diet Regular-  Entered: Aug 29 2023 10:08AM    The following pending diet order is being considered for treatment of Moderate protein-calorie malnutrition:null

## 2023-09-04 NOTE — PROGRESS NOTE ADULT - SUBJECTIVE AND OBJECTIVE BOX
CC: Patient is a 88y old  Female who presents with a chief complaint of COVID-19 (03 Sep 2023 10:56)      Interval History:  Patient seen and examined at bedside.  No overnight events  No complaints this morning    ALLERGIES:  penicillin (Unknown)    MEDICATIONS  (STANDING):  apixaban 2.5 milliGRAM(s) Oral two times a day  cyanocobalamin 1000 MICROGram(s) Oral daily  furosemide    Tablet 20 milliGRAM(s) Oral daily  metoprolol succinate ER 25 milliGRAM(s) Oral daily  multivitamin 1 Tablet(s) Oral daily  traMADol 50 milliGRAM(s) Oral three times a day    MEDICATIONS  (PRN):  acetaminophen     Tablet .. 650 milliGRAM(s) Oral every 6 hours PRN Mild Pain (1 - 3)  OLANZapine 2.5 milliGRAM(s) Oral at bedtime PRN agitation    Vital Signs Last 24 Hrs  T(F): 98.4 (04 Sep 2023 05:50), Max: 99.8 (03 Sep 2023 19:40)  HR: 83 (04 Sep 2023 05:50) (83 - 100)  BP: 121/64 (04 Sep 2023 05:50) (121/64 - 139/82)  RR: 18 (04 Sep 2023 05:50) (17 - 18)  SpO2: 95% (04 Sep 2023 05:50) (94% - 95%)  I&O's Summary    03 Sep 2023 07:01  -  04 Sep 2023 07:00  --------------------------------------------------------  IN: 660 mL / OUT: 0 mL / NET: 660 mL          PHYSICAL EXAM:  GENERAL: NAD  NERVOUS SYSTEM:  CN II - XII intact; Sensation intact; follows commands  CHEST/LUNG: Clear to percussion bilaterally; No rales, rhonchi, wheezing, or rubs; normal respiratory effort, no intercostal retractions  HEART: Regular rate and rhythm; No murmurs, rubs, or gallops; No pitting edema  ABDOMEN: Soft, Nontender, Nondistended; Bowel sounds present; No HSM or masses  MUSCULOSKELETAL/EXTREMITIES:  2+ Peripheral Pulses, No clubbing or digital cyanosis; FROM of extremeties (pain, crepitation or contracture)  PSYCH: Appropriate affect, Alert & Oriented x 3, Good Memory; Good insight    LABS:                        10.2   4.83  )-----------( 168      ( 03 Sep 2023 08:38 )             31.6       09-03    143  |  109  |  37  ----------------------------<  100  4.2   |  30  |  0.63    Ca    8.9      03 Sep 2023 08:38                                    Urinalysis Basic - ( 03 Sep 2023 08:38 )    Color: x / Appearance: x / SG: x / pH: x  Gluc: 100 mg/dL / Ketone: x  / Bili: x / Urobili: x   Blood: x / Protein: x / Nitrite: x   Leuk Esterase: x / RBC: x / WBC x   Sq Epi: x / Non Sq Epi: x / Bacteria: x            Care Discussed with Consultants/Other Providers: Yes   CC: Patient is a 88y old  Female who presents with a chief complaint of COVID-19 (03 Sep 2023 10:56)      Interval History:  Patient seen and examined at bedside.  No overnight events  No complaints this morning    ALLERGIES:  penicillin (Unknown)    MEDICATIONS  (STANDING):  apixaban 2.5 milliGRAM(s) Oral two times a day  cyanocobalamin 1000 MICROGram(s) Oral daily  furosemide    Tablet 20 milliGRAM(s) Oral daily  metoprolol succinate ER 25 milliGRAM(s) Oral daily  multivitamin 1 Tablet(s) Oral daily  traMADol 50 milliGRAM(s) Oral three times a day    MEDICATIONS  (PRN):  acetaminophen     Tablet .. 650 milliGRAM(s) Oral every 6 hours PRN Mild Pain (1 - 3)  OLANZapine 2.5 milliGRAM(s) Oral at bedtime PRN agitation    Vital Signs Last 24 Hrs  T(F): 98.4 (04 Sep 2023 05:50), Max: 99.8 (03 Sep 2023 19:40)  HR: 83 (04 Sep 2023 05:50) (83 - 100)  BP: 121/64 (04 Sep 2023 05:50) (121/64 - 139/82)  RR: 18 (04 Sep 2023 05:50) (17 - 18)  SpO2: 95% (04 Sep 2023 05:50) (94% - 95%)  I&O's Summary    03 Sep 2023 07:01  -  04 Sep 2023 07:00  --------------------------------------------------------  IN: 660 mL / OUT: 0 mL / NET: 660 mL    PHYSICAL EXAM:  GENERAL: NAD  NERVOUS SYSTEM:  CN II - XII intact; Sensation intact; follows commands  CHEST/LUNG: Clear to percussion bilaterally; No rales, rhonchi, wheezing, or rubs; normal respiratory effort, no intercostal retractions  HEART: Regular rate and rhythm; No murmurs, rubs, or gallops; No pitting edema  ABDOMEN: Soft, Nontender, Nondistended; Bowel sounds present; No HSM or masses  MUSCULOSKELETAL/EXTREMITIES:  2+ Peripheral Pulses, No clubbing or digital cyanosis  PSYCH: Appropriate affect, Alert & Awake    LABS:                        10.2   4.83  )-----------( 168      ( 03 Sep 2023 08:38 )             31.6       09-03    143  |  109  |  37  ----------------------------<  100  4.2   |  30  |  0.63    Ca    8.9      03 Sep 2023 08:38    Urinalysis Basic - ( 03 Sep 2023 08:38 )  Color: x / Appearance: x / SG: x / pH: x  Gluc: 100 mg/dL / Ketone: x  / Bili: x / Urobili: x   Blood: x / Protein: x / Nitrite: x   Leuk Esterase: x / RBC: x / WBC x   Sq Epi: x / Non Sq Epi: x / Bacteria: x    Care Discussed with Consultants/Other Providers: Yes

## 2023-10-19 NOTE — DISCHARGE NOTE NURSING/CASE MANAGEMENT/SOCIAL WORK - NSTRANSFERBELONGINGSDISPO_GEN_A_NUR
with patient Adjacent Tissue Transfer Text: The defect edges were debeveled with a #15 scalpel blade.  Given the location of the defect and the proximity to free margins an adjacent tissue transfer was deemed most appropriate.  Using a sterile surgical marker, an appropriate flap was drawn incorporating the defect and placing the expected incisions within the relaxed skin tension lines where possible.    The area thus outlined was incised deep to adipose tissue with a #15 scalpel blade.  The skin margins were undermined to an appropriate distance in all directions utilizing iris scissors.

## 2023-12-13 RX ORDER — ACETAMINOPHEN 500 MG
2 TABLET ORAL
Refills: 0 | DISCHARGE

## 2023-12-13 RX ORDER — TRAMADOL HYDROCHLORIDE 50 MG/1
1 TABLET ORAL
Refills: 0 | DISCHARGE

## 2023-12-13 RX ORDER — LISINOPRIL 2.5 MG/1
1 TABLET ORAL
Refills: 0 | DISCHARGE

## 2023-12-13 RX ORDER — POTASSIUM CHLORIDE 20 MEQ
1 PACKET (EA) ORAL
Refills: 0 | DISCHARGE

## 2023-12-20 ENCOUNTER — EMERGENCY (EMERGENCY)
Facility: HOSPITAL | Age: 88
LOS: 1 days | Discharge: HOME | End: 2023-12-20
Attending: EMERGENCY MEDICINE | Admitting: EMERGENCY MEDICINE
Payer: MEDICARE

## 2023-12-20 VITALS
TEMPERATURE: 97 F | WEIGHT: 119.93 LBS | HEART RATE: 71 BPM | SYSTOLIC BLOOD PRESSURE: 123 MMHG | HEIGHT: 64 IN | RESPIRATION RATE: 18 BRPM | OXYGEN SATURATION: 97 % | DIASTOLIC BLOOD PRESSURE: 65 MMHG

## 2023-12-20 VITALS
HEART RATE: 72 BPM | DIASTOLIC BLOOD PRESSURE: 72 MMHG | SYSTOLIC BLOOD PRESSURE: 123 MMHG | RESPIRATION RATE: 16 BRPM | OXYGEN SATURATION: 99 %

## 2023-12-20 DIAGNOSIS — Z90.710 ACQUIRED ABSENCE OF BOTH CERVIX AND UTERUS: Chronic | ICD-10-CM

## 2023-12-20 DIAGNOSIS — Z98.890 OTHER SPECIFIED POSTPROCEDURAL STATES: Chronic | ICD-10-CM

## 2023-12-20 PROBLEM — F03.90 UNSPECIFIED DEMENTIA, UNSPECIFIED SEVERITY, WITHOUT BEHAVIORAL DISTURBANCE, PSYCHOTIC DISTURBANCE, MOOD DISTURBANCE, AND ANXIETY: Chronic | Status: ACTIVE | Noted: 2023-03-25

## 2023-12-20 PROBLEM — I10 ESSENTIAL (PRIMARY) HYPERTENSION: Chronic | Status: ACTIVE | Noted: 2023-03-25

## 2023-12-20 PROBLEM — I34.1 NONRHEUMATIC MITRAL (VALVE) PROLAPSE: Chronic | Status: ACTIVE | Noted: 2023-03-25

## 2023-12-20 PROBLEM — F03.90 UNSPECIFIED DEMENTIA WITHOUT BEHAVIORAL DISTURBANCE: Chronic | Status: ACTIVE | Noted: 2023-03-25

## 2023-12-20 PROBLEM — M48.50XA COLLAPSED VERTEBRA, NOT ELSEWHERE CLASSIFIED, SITE UNSPECIFIED, INITIAL ENCOUNTER FOR FRACTURE: Chronic | Status: ACTIVE | Noted: 2023-03-25

## 2023-12-20 PROBLEM — I48.20 CHRONIC ATRIAL FIBRILLATION, UNSPECIFIED: Chronic | Status: ACTIVE | Noted: 2023-03-25

## 2023-12-20 PROCEDURE — 73110 X-RAY EXAM OF WRIST: CPT

## 2023-12-20 PROCEDURE — 99284 EMERGENCY DEPT VISIT MOD MDM: CPT | Mod: 25

## 2023-12-20 PROCEDURE — 70450 CT HEAD/BRAIN W/O DYE: CPT | Mod: MA

## 2023-12-20 PROCEDURE — 73030 X-RAY EXAM OF SHOULDER: CPT | Mod: 26,RT

## 2023-12-20 PROCEDURE — 70450 CT HEAD/BRAIN W/O DYE: CPT | Mod: 26,MA

## 2023-12-20 PROCEDURE — 73030 X-RAY EXAM OF SHOULDER: CPT

## 2023-12-20 PROCEDURE — 73110 X-RAY EXAM OF WRIST: CPT | Mod: 26,RT

## 2023-12-20 PROCEDURE — 99285 EMERGENCY DEPT VISIT HI MDM: CPT

## 2023-12-20 PROCEDURE — 73080 X-RAY EXAM OF ELBOW: CPT | Mod: 26,RT

## 2023-12-20 PROCEDURE — 73080 X-RAY EXAM OF ELBOW: CPT

## 2023-12-20 PROCEDURE — 72125 CT NECK SPINE W/O DYE: CPT | Mod: MA

## 2023-12-20 PROCEDURE — 72125 CT NECK SPINE W/O DYE: CPT | Mod: 26,MA

## 2023-12-20 NOTE — ED ADULT NURSE NOTE - NSICDXPASTMEDICALHX_GEN_ALL_CORE_FT
PAST MEDICAL HISTORY:  Asthma     Atrial fibrillation     Benign essential HTN     Chronic atrial fibrillation     Dementia     MVP (mitral valve prolapse)     Spinal compression fracture

## 2023-12-20 NOTE — ED PROVIDER NOTE - WR ORDER ID 2
Problem: Falls - Risk of  Goal: *Absence of Falls  Description: Document Grover Larson Fall Risk and appropriate interventions in the flowsheet. Outcome: Progressing Towards Goal  Note: Fall Risk Interventions:  Mobility Interventions: Bed/chair exit alarm, OT consult for ADLs, Patient to call before getting OOB         Medication Interventions: Assess postural VS orthostatic hypotension, Patient to call before getting OOB, Teach patient to arise slowly    Elimination Interventions: Bed/chair exit alarm, Call light in reach              Problem: General Wound Care  Goal: *Non-infected wound: Improvement of existing wound, absence of infection, and maintenance of skin integrity  Outcome: Progressing Towards Goal  Goal: *Infected Wound: Prevention of further infection and promotion of healing  Description: Infection control procedures (eg: clean dressings, clean gloves, hand washing, precautions to isolate wound from contamination, sterile instruments used for wound debridement) should be implemented.   Outcome: Progressing Towards Goal  Goal: Interventions  Outcome: Progressing Towards Goal [id number]

## 2023-12-20 NOTE — ED ADULT NURSE NOTE - CHIEF COMPLAINT QUOTE
BIB EMS from [facility] s/p witnessed fall. Pt c/o mild right elbow/wrist pain. Pt on eliquis. Denies head strike or LOC. BIB EMS from [facility] s/p witnessed fall. Pt c/o mild right elbow/wrist pain. Pt on eliquis. Denies head strike or LOC.

## 2023-12-20 NOTE — ED PROVIDER NOTE - PATIENT PORTAL LINK FT
You can access the FollowMyHealth Patient Portal offered by [facility] by registering at the following website: [email or web address]. By joining [employer] [facility]’s FollowMyHealth portal, you will also be able to view your health information using other applications (apps) compatible with our system. You can access the FollowMyHealth Patient Portal offered by [facility] by registering at the following website: [email or web address]. By joining [email or web address]’s FollowMyHealth portal, you will also be able to view your health information using other applications (apps) compatible with our system.

## 2023-12-20 NOTE — ED ADULT NURSE NOTE - OBJECTIVE STATEMENT
BIB EMS from [facility] s/p witnessed fall. Pt c/o mild right upper arm, elbow/wrist pain. Pt on eliquis. Denies head strike or LOC. PMH of dementia. BIB EMS from [facility] s/p witnessed fall. Pt c/o mild right upper arm, elbow/wrist pain. Pt on eliquis. Denies head strike or LOC. PMH of dementia.

## 2023-12-20 NOTE — ED ADULT NURSE NOTE - NSFALLRISKFACTORS_ED_ALL_ED
RN reports patient with increased cough productive- needing oxygen 2-3L and feeling SOB  Also had several episodes of diarrhea 3-4 this morning  He did miss banana flakes during his procedure day  Lungs to auscultation with minimal wheezes and some coarse sounds at bases, but otherwise clear  Plan for acute respiratory distress: Insert IV  Labs drawn  Respiratory treatment x 1 now  CXR STAT - showing worsening right sided consolidation ? Asp PNA  WBC elevated     Reassess   Consult Pulmonary and ID    Uzair Castillo MD Bone Condition: Including osteoporosis, prolonged steroid use or metastatic bone disease/cancer

## 2023-12-20 NOTE — ED ADULT TRIAGE NOTE - CHIEF COMPLAINT QUOTE
BIB EMS from [facility] s/p witnessed fall. Pt c/o mild right elbow/wrist pain. Pt on eliquis. Denies head strike or LOC. BIB EMS from [facility] s/p witnessed fall. Pt c/o mild right elbow/wrist pain. Pt on eliquis. Denies head strike or LOC.

## 2023-12-20 NOTE — ED ADULT NURSE NOTE - NSFALLHARMRISKINTERV_ED_ALL_ED
Assistance OOB with selected safe patient handling equipment if applicable/Assistance with ambulation/Communicate risk of Fall with Harm to all staff, patient, and family/Monitor gait and stability/Monitor for mental status changes and reorient to person, place, and time, as needed/Provide visual cue: red socks, yellow wristband, yellow gown, etc/Reinforce activity limits and safety measures with patient and family/Toileting schedule using arm’s reach rule for commode and bathroom/Use of alarms - bed, stretcher, chair and/or video monitoring/Bed in lowest position, wheels locked, appropriate side rails in place/Call bell, personal items and telephone in reach/Instruct patient to call for assistance before getting out of bed/chair/stretcher/Non-slip footwear applied when patient is off stretcher/[place] to call system/Physically safe environment - no spills, clutter or unnecessary equipment/Purposeful Proactive Rounding/Room/bathroom lighting operational, light cord in reach Assistance OOB with selected safe patient handling equipment if applicable/Assistance with ambulation/Communicate risk of Fall with Harm to all staff, patient, and family/Monitor gait and stability/Monitor for mental status changes and reorient to person, place, and time, as needed/Provide visual cue: red socks, yellow wristband, yellow gown, etc/Reinforce activity limits and safety measures with patient and family/Toileting schedule using arm’s reach rule for commode and bathroom/Use of alarms - bed, stretcher, chair and/or video monitoring/Bed in lowest position, wheels locked, appropriate side rails in place/Call bell, personal items and telephone in reach/Instruct patient to call for assistance before getting out of bed/chair/stretcher/Non-slip footwear applied when patient is off stretcher/[place] to call system/Physically safe environment - no spills, clutter or unnecessary equipment/Purposeful Proactive Rounding/Room/bathroom lighting operational, light cord in reach

## 2023-12-20 NOTE — ED ADULT NURSE NOTE - NSICDXPASTSURGICALHX_GEN_ALL_CORE_FT
,DirectAddress_Unknown
PAST SURGICAL HISTORY:  History of repair of mitral valve     S/P hysterectomy

## 2023-12-20 NOTE — ED PROVIDER NOTE - OBJECTIVE STATEMENT
88 year old female with right upper extremity pain s/p fall today. Patient has dementia, unable to give the details of the fall, history obtained from the document from [facility], assisted living. reports right shoulder/elbow pain, Denies HA, NVD, fever ,cough, sob, chest/abdominal/back/neck pain, HA, focal weakness/numbness. On eliquis. 88 year old female with right upper extremity pain s/p fall today. Patient has dementia, unable to give the details of the fall, history obtained from the document from [facility], assisted living. reports right shoulder/elbow pain, Denies HA, NVD, fever ,cough, sob, chest/abdominal/back/neck pain, HA, focal weakness/numbness. On eliquis.

## 2024-01-02 NOTE — ED PROVIDER NOTE - CARE PLAN
1 Principal Discharge DX:	Viral illness   -known meningioma. CT head w/o vasogenic edema.   - neurosurgery team has seen pt who recommended MRI of brain which revealed large left cavernous mass with plan for VPS placement on 1/4/24  -  OPHTHALMOLOGY was consulted with no acute findings

## 2024-01-26 ENCOUNTER — INPATIENT (INPATIENT)
Facility: HOSPITAL | Age: 89
LOS: 4 days | Discharge: HOME | DRG: 194 | End: 2024-01-31
Attending: FAMILY MEDICINE | Admitting: STUDENT IN AN ORGANIZED HEALTH CARE EDUCATION/TRAINING PROGRAM
Payer: MEDICARE

## 2024-01-26 VITALS
RESPIRATION RATE: 18 BRPM | TEMPERATURE: 98 F | OXYGEN SATURATION: 97 % | HEIGHT: 64 IN | DIASTOLIC BLOOD PRESSURE: 78 MMHG | SYSTOLIC BLOOD PRESSURE: 126 MMHG | HEART RATE: 73 BPM | WEIGHT: 113.98 LBS

## 2024-01-26 DIAGNOSIS — Z90.710 ACQUIRED ABSENCE OF BOTH CERVIX AND UTERUS: Chronic | ICD-10-CM

## 2024-01-26 DIAGNOSIS — J10.1 INFLUENZA DUE TO OTHER IDENTIFIED INFLUENZA VIRUS WITH OTHER RESPIRATORY MANIFESTATIONS: ICD-10-CM

## 2024-01-26 DIAGNOSIS — Z98.890 OTHER SPECIFIED POSTPROCEDURAL STATES: Chronic | ICD-10-CM

## 2024-01-26 LAB
ALBUMIN SERPL ELPH-MCNC: 3.5 G/DL — SIGNIFICANT CHANGE UP (ref 3.3–5)
ALP SERPL-CCNC: 89 U/L — SIGNIFICANT CHANGE UP (ref 40–120)
ALT FLD-CCNC: 14 U/L — SIGNIFICANT CHANGE UP (ref 10–45)
ANION GAP SERPL CALC-SCNC: 6 MMOL/L — SIGNIFICANT CHANGE UP (ref 5–17)
AST SERPL-CCNC: 22 U/L — SIGNIFICANT CHANGE UP (ref 10–40)
BASOPHILS # BLD AUTO: 0.04 K/UL — SIGNIFICANT CHANGE UP (ref 0–0.2)
BASOPHILS NFR BLD AUTO: 0.5 % — SIGNIFICANT CHANGE UP (ref 0–2)
BILIRUB SERPL-MCNC: 0.6 MG/DL — SIGNIFICANT CHANGE UP (ref 0.2–1.2)
BUN SERPL-MCNC: 19 MG/DL — SIGNIFICANT CHANGE UP (ref 7–23)
CALCIUM SERPL-MCNC: 9.2 MG/DL — SIGNIFICANT CHANGE UP (ref 8.4–10.5)
CHLORIDE SERPL-SCNC: 101 MMOL/L — SIGNIFICANT CHANGE UP (ref 96–108)
CO2 SERPL-SCNC: 29 MMOL/L — SIGNIFICANT CHANGE UP (ref 22–31)
CREAT SERPL-MCNC: 0.78 MG/DL — SIGNIFICANT CHANGE UP (ref 0.5–1.3)
EGFR: 73 ML/MIN/1.73M2 — SIGNIFICANT CHANGE UP
EOSINOPHIL # BLD AUTO: 0.08 K/UL — SIGNIFICANT CHANGE UP (ref 0–0.5)
EOSINOPHIL NFR BLD AUTO: 1 % — SIGNIFICANT CHANGE UP (ref 0–6)
FLUAV AG NPH QL: DETECTED
FLUBV AG NPH QL: SIGNIFICANT CHANGE UP
GLUCOSE SERPL-MCNC: 98 MG/DL — SIGNIFICANT CHANGE UP (ref 70–99)
HCT VFR BLD CALC: 35 % — SIGNIFICANT CHANGE UP (ref 34.5–45)
HGB BLD-MCNC: 11.7 G/DL — SIGNIFICANT CHANGE UP (ref 11.5–15.5)
IMM GRANULOCYTES NFR BLD AUTO: 0.4 % — SIGNIFICANT CHANGE UP (ref 0–0.9)
LYMPHOCYTES # BLD AUTO: 1.43 K/UL — SIGNIFICANT CHANGE UP (ref 1–3.3)
LYMPHOCYTES # BLD AUTO: 18.7 % — SIGNIFICANT CHANGE UP (ref 13–44)
MCHC RBC-ENTMCNC: 29.7 PG — SIGNIFICANT CHANGE UP (ref 27–34)
MCHC RBC-ENTMCNC: 33.4 GM/DL — SIGNIFICANT CHANGE UP (ref 32–36)
MCV RBC AUTO: 88.8 FL — SIGNIFICANT CHANGE UP (ref 80–100)
MONOCYTES # BLD AUTO: 1 K/UL — HIGH (ref 0–0.9)
MONOCYTES NFR BLD AUTO: 13.1 % — SIGNIFICANT CHANGE UP (ref 2–14)
NEUTROPHILS # BLD AUTO: 5.05 K/UL — SIGNIFICANT CHANGE UP (ref 1.8–7.4)
NEUTROPHILS NFR BLD AUTO: 66.3 % — SIGNIFICANT CHANGE UP (ref 43–77)
NRBC # BLD: 0 /100 WBCS — SIGNIFICANT CHANGE UP (ref 0–0)
PLATELET # BLD AUTO: 179 K/UL — SIGNIFICANT CHANGE UP (ref 150–400)
POTASSIUM SERPL-MCNC: 3.9 MMOL/L — SIGNIFICANT CHANGE UP (ref 3.5–5.3)
POTASSIUM SERPL-SCNC: 3.9 MMOL/L — SIGNIFICANT CHANGE UP (ref 3.5–5.3)
PROT SERPL-MCNC: 7.6 G/DL — SIGNIFICANT CHANGE UP (ref 6–8.3)
RBC # BLD: 3.94 M/UL — SIGNIFICANT CHANGE UP (ref 3.8–5.2)
RBC # FLD: 16.6 % — HIGH (ref 10.3–14.5)
RSV RNA NPH QL NAA+NON-PROBE: SIGNIFICANT CHANGE UP
SARS-COV-2 RNA SPEC QL NAA+PROBE: SIGNIFICANT CHANGE UP
SODIUM SERPL-SCNC: 136 MMOL/L — SIGNIFICANT CHANGE UP (ref 135–145)
WBC # BLD: 7.63 K/UL — SIGNIFICANT CHANGE UP (ref 3.8–10.5)
WBC # FLD AUTO: 7.63 K/UL — SIGNIFICANT CHANGE UP (ref 3.8–10.5)

## 2024-01-26 PROCEDURE — 99285 EMERGENCY DEPT VISIT HI MDM: CPT

## 2024-01-26 PROCEDURE — 99497 ADVNCD CARE PLAN 30 MIN: CPT | Mod: 25

## 2024-01-26 PROCEDURE — 99223 1ST HOSP IP/OBS HIGH 75: CPT

## 2024-01-26 PROCEDURE — 71045 X-RAY EXAM CHEST 1 VIEW: CPT | Mod: 26

## 2024-01-26 RX ORDER — SENNA PLUS 8.6 MG/1
2 TABLET ORAL AT BEDTIME
Refills: 0 | Status: DISCONTINUED | OUTPATIENT
Start: 2024-01-26 | End: 2024-01-31

## 2024-01-26 RX ORDER — METOPROLOL TARTRATE 50 MG
25 TABLET ORAL DAILY
Refills: 0 | Status: DISCONTINUED | OUTPATIENT
Start: 2024-01-26 | End: 2024-01-27

## 2024-01-26 RX ORDER — PREGABALIN 225 MG/1
1000 CAPSULE ORAL DAILY
Refills: 0 | Status: DISCONTINUED | OUTPATIENT
Start: 2024-01-26 | End: 2024-01-31

## 2024-01-26 RX ORDER — ACETAMINOPHEN 500 MG
650 TABLET ORAL EVERY 6 HOURS
Refills: 0 | Status: DISCONTINUED | OUTPATIENT
Start: 2024-01-26 | End: 2024-01-26

## 2024-01-26 RX ORDER — FUROSEMIDE 40 MG
20 TABLET ORAL DAILY
Refills: 0 | Status: DISCONTINUED | OUTPATIENT
Start: 2024-01-26 | End: 2024-01-31

## 2024-01-26 RX ORDER — OLANZAPINE 15 MG/1
10 TABLET, FILM COATED ORAL ONCE
Refills: 0 | Status: COMPLETED | OUTPATIENT
Start: 2024-01-26 | End: 2024-01-26

## 2024-01-26 RX ORDER — ACETAMINOPHEN 500 MG
650 TABLET ORAL EVERY 8 HOURS
Refills: 0 | Status: DISCONTINUED | OUTPATIENT
Start: 2024-01-26 | End: 2024-01-31

## 2024-01-26 RX ORDER — APIXABAN 2.5 MG/1
2.5 TABLET, FILM COATED ORAL
Refills: 0 | Status: DISCONTINUED | OUTPATIENT
Start: 2024-01-26 | End: 2024-01-31

## 2024-01-26 RX ORDER — TRAMADOL HYDROCHLORIDE 50 MG/1
50 TABLET ORAL THREE TIMES A DAY
Refills: 0 | Status: DISCONTINUED | OUTPATIENT
Start: 2024-01-26 | End: 2024-01-30

## 2024-01-26 RX ORDER — LANOLIN ALCOHOL/MO/W.PET/CERES
3 CREAM (GRAM) TOPICAL AT BEDTIME
Refills: 0 | Status: DISCONTINUED | OUTPATIENT
Start: 2024-01-26 | End: 2024-01-31

## 2024-01-26 RX ORDER — TRAMADOL HYDROCHLORIDE 50 MG/1
50 TABLET ORAL ONCE
Refills: 0 | Status: DISCONTINUED | OUTPATIENT
Start: 2024-01-26 | End: 2024-01-26

## 2024-01-26 RX ORDER — POLYETHYLENE GLYCOL 3350 17 G/17G
17 POWDER, FOR SOLUTION ORAL DAILY
Refills: 0 | Status: DISCONTINUED | OUTPATIENT
Start: 2024-01-26 | End: 2024-01-31

## 2024-01-26 RX ADMIN — TRAMADOL HYDROCHLORIDE 50 MILLIGRAM(S): 50 TABLET ORAL at 15:26

## 2024-01-26 RX ADMIN — OLANZAPINE 10 MILLIGRAM(S): 15 TABLET, FILM COATED ORAL at 16:56

## 2024-01-26 RX ADMIN — Medication 1 MILLIGRAM(S): at 18:09

## 2024-01-26 RX ADMIN — TRAMADOL HYDROCHLORIDE 50 MILLIGRAM(S): 50 TABLET ORAL at 16:26

## 2024-01-26 NOTE — CHART NOTE - NSCHARTNOTEFT_GEN_A_CORE
Emergency Department Social Work Geriatric Initial Assessment    Cognitive Mental Status - Alert x 1   Primary Caregiver Information – Silvia Chakraborty, daughter [phone]  Emergency Contact Information – Silvia Chakraborty  Primary Care Physician / Specialists - DR. George Dewitt [phone]  Functional Status Prior to ED Visit - Patient needs assistance with ADL  Family and Social Support – Patient's daughter is family support  Living Arrangement - Patient is a resident of [facility] [phone]  Services Present on Admission – Assisted Living  Assistive Devices (Durable Medical Equipment) –  Patient uses a walker with ambulation   ADLs & Degree of [place] –  Patient needs assistance with ADL  Barriers to obtain medications - No barriers to medication  Barriers to attend medical appointments -  No barriers to medical appointments  ISAR Score – 4  Advanced Directives – Silvia is Power of Authority and HCP  Follow up care – Primary care physician  Discharge Transportation – To be determine  Summary/Recommendations/Referrals -    The patient is a resident of [facility] living.  The patient requires assistance with ADL, IADL and uses a walker.  As per MD, the patient was stable to return to the facility.  PETE spoke with the  on the discharge plan.  The assisted living facility reported that they were unable to accept the patient at the facility with a positive influenza diagnosis.  As per , the patient has to remain in isolation for 5 days.  Presently, the patient shares a room and they have no other available accommodation.  PETE spoke with daughter, Silvia.  Silvia confirmed that several conditions such as influenza, COVID, and other infectious respiratory virus would have to be ruled out prior to the patient's return to the facility.  MD was advised. Emergency Department Social Work Geriatric Initial Assessment    Cognitive Mental Status - Alert x 1   Primary Caregiver Information – Silvia Chakraborty, daughter [phone]  Emergency Contact Information – Silvia Chakraborty  Primary Care Physician / Specialists - DR. George Dewitt [phone]  Functional Status Prior to ED Visit - Patient needs assistance with ADL  Family and Social Support – Patient's daughter is family support  Living Arrangement - Patient is a resident of [facility] [phone]  Services Present on Admission – Assisted Living  Assistive Devices (Durable Medical Equipment) –  Patient uses a walker with ambulation   ADLs & Degree of [place] –  Patient needs assistance with ADL  Barriers to obtain medications - No barriers to medication  Barriers to attend medical appointments -  No barriers to medical appointments  ISAR Score – 4  Advanced Directives – Silvia is Power of Authority and HCP  Follow up care – Primary care physician  Discharge Transportation – To be determine  Summary/Recommendations/Referrals -    The patient is a resident of [facility] living.  The patient requires assistance with ADL, IADL and uses a walker.  As per MD, the patient was stable to return to the facility.  PETE spoke with the  on the discharge plan.  The assisted living facility reported that they were unable to accept the patient at the facility with a positive influenza diagnosis.  As per , the patient has to remain in isolation for 5 days.  Presently, the patient shares a room and they have no other available accommodation.  PETE spoke with daughter, Silvia.  Silvia confirmed that several conditions such as influenza, COVID, and other infectious respiratory virus would have to be ruled out prior to the patient's return to the facility.  MD was advised.

## 2024-01-26 NOTE — ED PROVIDER NOTE - PROGRESS NOTE DETAILS
Kushal Montanez ER Attending Patient from [facility]  Spoke to nursing supervisor at the  group home who states that since patient is influenza positive patient cannot return back to the group home and needs to be quarantined for 5 days as per their protocol.  Patient will likely need admission to the hospital prior to being sent back to the group home.

## 2024-01-26 NOTE — ED ADULT NURSE NOTE - NSFALLHARMRISKINTERV_ED_ALL_ED

## 2024-01-26 NOTE — ED ADULT NURSE REASSESSMENT NOTE - NS ED NURSE REASSESS COMMENT FT1
Patient noted to be attempting to get out of bed. Patient moved closer to the nursing station. Red socks in place, Posey in place

## 2024-01-26 NOTE — ED ADULT NURSE NOTE - OBJECTIVE STATEMENT
Patient came from group home with complaint of cough since this morning. Denies any nausea, fever, or chills. PMH of dementia.

## 2024-01-26 NOTE — H&P ADULT - HISTORY OF PRESENT ILLNESS
Ms. Rey is a 88 year old female with a PMHx: Afib, Dementia brought in via EMS to the ED today from [facility]  assisted living facility. Pt is A+OX1 at baseline and does not know why she is here and states she feels fine. Endorses only rhinorrhea x2-3days with cough. Pt denies F/C/N/V, diarrhea, CP, SOB, dizziness, headaches or abdominal pain,  Ms. Rey is a 88 year old female with a PMHx: Dementia, Afib, T12 compression fracture,mitral valve replacement,  brought in via EMS to the ED today from [facility]  assisted living facility. Pt is A+OX1 at baseline and a poor historian. She does not know why she is here and states she feels fine. Endorses only rhinorrhea x2-3days with cough. Pt denies F/C/N/V, diarrhea, CP, SOB, dizziness, headaches or abdominal pain.  Ms. Rey is a 88 year old female with a PMHx: Dementia, Afib, T12 compression fracture, mitral valve replacement,  brought in via EMS to the ED today from [facility]  assisted living facility. Pt is A+OX1 at baseline and a poor historian. She does not know why she is here and states she feels fine. Endorses only rhinorrhea x 2-3days. Pt denies F/C/N/V, diarrhea, CP, SOB, dizziness, headaches or abdominal pain.

## 2024-01-26 NOTE — H&P ADULT - NSHPSOCIALHISTORY_GEN_ALL_CORE
SOCIAL HISTORY:   Smoking - Denied  EtOH - Glass of wine at dinner.   Drugs - Denied    FUNCTIONAL HISTORY  Lives in assisted living facility: [facility].

## 2024-01-26 NOTE — H&P ADULT - NSHPREVIEWOFSYSTEMS_GEN_ALL_CORE
REVIEW OF SYSTEMS:  CONSTITUTIONAL: NAD, No fever, or fatigue  EYES: No eye pain, visual disturbances, or discharge  ENMT:  No difficulty hearing, vertigo; No sinus or throat pain  NECK: No neck pain or neck stiffness  RESPIRATORY: No cough, no wheezing, chills or hemoptysis; No shortness of breath  CARDIOVASCULAR: No chest pain, palpitations, dizziness, or leg swelling  GASTROINTESTINAL: No abdominal pain, No nausea, vomiting, or hematemesis; No diarrhea or constipation  GENITOURINARY: No dysuria, frequency, hematuria, or incontinence  NEUROLOGICAL: No headaches, memory loss, loss of strength, numbness, or tremors  SKIN: No itching, burning, rashes, or lesions   MUSCULOSKELETAL:  No joint pain or swelling; No muscle, back, or extremity pain  PSYCHIATRIC: + Dementia, No depression, anxiety, mood swings, or difficulty sleeping  HEME/LYMPH: No easy bruising or bleeding    All other ROS reviewed and negative except as otherwise stated REVIEW OF SYSTEMS: EENT: +Rhinorrhea x2-3 days.

## 2024-01-26 NOTE — H&P ADULT - NSHPPHYSICALEXAM_GEN_ALL_CORE
Vital Signs Last 24 Hrs  T(F): 98.2 (26 Jan 2024 11:50), Max: 98.2 (26 Jan 2024 11:50)  HR: 73 (26 Jan 2024 11:50) (73 - 73)  BP: 135/95 (26 Jan 2024 15:27) (126/78 - 135/95)  RR: 18 (26 Jan 2024 11:50) (18 - 18)  SpO2: 97% (26 Jan 2024 11:50) (97% - 97%)    PHYSICAL EXAM:  GENERAL: NAD, well-developed.   HEAD:  Atraumatic, Normocephalic  EYES: EOMI, conjunctiva and sclera clear  ENMT: Moist mucous membranes, no thrush  NECK: Supple, No JVD  CHEST/LUNG: +course posterior breath sounds , good air entry, non-labored breathing.  HEART: + Murmur, S1/S2,   ABDOMEN: Rounded, Soft, Nontender, Bowel sounds present  VASCULAR: Normal pulses, Normal capillary refill  EXTREMITIES: No calf tenderness, No cyanosis, No edema  SKIN: Warm, Intact  PSYCH: +Demented, calm.   NERVOUS SYSTEM:  A/O x1,  No focal deficits noted.

## 2024-01-26 NOTE — ED ADULT TRIAGE NOTE - CHIEF COMPLAINT QUOTE
Patient BIB EMS from [facility] for cough since this morning. As per EMS patient is afebrile, cough is infrequent. Patient is A&O to baseline, hx of dementia.   (Of note, patient is asking for her purse, EMS states it was left in her room at the facility.)

## 2024-01-26 NOTE — ED ADULT NURSE NOTE - CHIEF COMPLAINT QUOTE
Patient BIB EMS from [facility] for cough since this morning. As per EMS patient is afebrile, cough is infrequent. Patient is A&O to baseline, hx of dementia.   (Of note, patient is asking for her purse, EMS states it was left in her room at the facility.)

## 2024-01-26 NOTE — ED PROVIDER NOTE - PHYSICAL EXAMINATION
General: NAD, well developed, well nourished.   Skin: no lesions, rashes, erythema.   HEENT: NC/AT, PERRLA, conjunctive & sclera clear.   Cardio: Normal S1 & S2. No murmurs, gallops, rubs.   Lungs: Normal rhythm without use of accessory muscles. Clear to ausculation b/l. No wheezing, rales, or rhonchi.   Abdomen: Soft, non-distended, non-tender. General: NAD, well developed, well nourished.   Skin: no lesions, rashes, erythema.   HEENT: NC/AT, PERRLA, conjunctive & sclera clear.   Cardio: Normal S1 & S2. No murmurs, gallops, rubs.   Lungs: Normal rhythm without use of accessory muscles. Clear to ausculation b/l. No wheezing, rales, or rhonchi.   Abdomen: Soft, non-distended, non-tender.  Neuro: : no focal deficits, no motor or sensory deficits.

## 2024-01-26 NOTE — H&P ADULT - ASSESSMENT
88 year old F PMH dementia, chronic a fib (on Eliquis), diastolic CHF admitted for COVID -19 infection.    #Cough/ Flu A +  - Maintain on droplet isolation (Day 1/5)  - Obtain daily room air O2 saturations  - Tamiflu 75mg BID x 5 days.  - Goals of Care discussion had with patient/surrogate: DNR/DNI    #Chronic CHFpEF  - appears clinically euvolemic  - continue lasix 20 daily, metoprolol xl 25mg daily.     #HTN  - chronic condition  - continue metoprolol xl 25mg daily.     #Chronic Atrial Fibrillation  - rate controlled with toprol XL 25mg  - continue Eliqius 2.5mg 1 tablet 2x daily for AC    #Health Maintenance:  - Multivitamin  1 tablet daily  - Cyanocobalamin 1000mcg, 1 tablet daily    #DVT PPx   - on eliquis 2.5mg 1 tablet 2x daily.     Daughter Silvia: [phone], Called x3 on 1/26, unable to leave voicemail as mailbox is full. Jazlyn not knowledgeable regarding pt medical hx.   Patient unable to return to [facility] x 5 days due to Flu A+ status. As per [facility]- can quarantine for 5 days and return, dc on 1/31/24.    88 year old F PMH dementia, chronic a fib (on Eliquis), diastolic CHF admitted for COVID -19 infection.    #Cough/ Flu A +  - Maintain on droplet isolation (Day 1/5)  - Obtain daily room air O2 saturations  - Tamiflu 75mg BID x 5 days.  - Goals of Care discussion had with patient/surrogate: DNR/DNI    #Chronic CHFpEF  - appears clinically euvolemic  - continue lasix 20 daily, metoprolol xl 25mg daily.     #HTN  - chronic condition  - continue metoprolol xl 25mg daily.     #Chronic Atrial Fibrillation  - rate controlled with toprol XL 25mg  - continue Eliqius 2.5mg 1 tablet 2x daily for AC    #Health Maintenance:  - Multivitamin  1 tablet daily  - Cyanocobalamin 1000mcg, 1 tablet daily    #DVT PPx   - on eliquis 2.5mg 1 tablet 2x daily.     Daughter Silvia: [phone], Called x3 on 1/26/24, unable to leave voicemail as mailbox is full. [facility] pt medical hx.   Patient unable to return to [facility] x 5 days due to Flu A+ status. As per [facility]- can quarantine for 5 days and return, potential for IV removed no hematoma Patient d/c'd by PA  denies CP denies SOB ambualting independently Patient verablizes understanding of discharge instructions Family at beside No questions on 1/31/24.    88 year old F PMH dementia, chronic a fib (on Eliquis), diastolic CHF admitted for COVID -19 infection.    #Cough/ Flu A +  - Maintain on droplet isolation (Day 1/5)  - Obtain daily room air O2 saturations  - Tamiflu 75mg BID x 5 days.  - Goals of Care discussion had with patient/surrogate: DNR/DNI    #Chronic CHFpEF  - appears clinically euvolemic  - continue lasix 20 daily, metoprolol xl 25mg daily.     #HTN  - chronic condition  - continue metoprolol xl 25mg daily.     #Chronic Atrial Fibrillation  - rate controlled with toprol XL 25mg  - continue Eliqius 2.5mg 1 tablet 2x daily for AC    #Health Maintenance:  - Multivitamin  1 tablet daily  - Cyanocobalamin 1000mcg, 1 tablet daily    #DVT PPx   - on eliquis 2.5mg 1 tablet 2x daily.     Daughter Silvia: [phone], Called x3 on 1/26/24, unable to leave voicemail as mailbox is full. [facility] not knowledgeable regarding pt medical hx.   Patient unable to return to [facility] x 5 days due to Flu A+ status. As per [facility]- can quarantine for 5 days and return. 88 year old F PMH dementia, chronic a fib (on Eliquis), diastolic CHF admitted for Influenzae A, unable to return to [facility] due to [facility] protocol.     #Cough/ Flu A +  -Admit to medicine floors  -Afebrile, hemodynamically stable  -Respirations comfortable on room air, O2 sat 97%, non hypoxic  -Continue pulse ox   -Maintain on droplet isolation (Day 1/5)  -Tamiflu 75mg BID x 5 days  -Goals of Care discussion had with patient/surrogate: DNR/DNI  -[facility] reportedly unable to accept patient until isolation complete - SW appreciated    #Chronic CHFpEF  -Appears clinically euvolemic  -Continue lasix 20 daily, metoprolol xl 25mg daily     #HTN, chronic, stable  -continue metoprolol xl 25mg daily    #Chronic Atrial Fibrillation  -Rate controlled with toprol XL 25mg  -Continue Eliqius 2.5mg 1 tablet 2x daily for AC    #Health Maintenance  -Multivitamin  1 tablet daily  -Cyanocobalamin 1000mcg, 1 tablet daily    #DVT PPx   -On eliquis 2.5mg 1 tablet 2x daily    Daughter Silvia: [phone], Called x3 on 1/26/24, unable to leave voicemail as mailbox is full. [facility] not knowledgeable regarding pt medical hx.     Patient unable to return to [facility] x 5 days due to Flu A+ status -reportedly unable to quarantine at [facility]. As per [facility]- will need to complete 5 day isolation. SW consult appreciated for dispo planning

## 2024-01-26 NOTE — H&P ADULT - PARTICIPANTS
Daughter Silvia Chakraborty called x3 no response, mail box full. Spoke with pt granddaughter, but she does not know pt medical hx./Patient

## 2024-01-26 NOTE — H&P ADULT - NSHPLABSRESULTS_GEN_ALL_CORE
LABS:                        11.7   7.63  )-----------( 179      ( 26 Jan 2024 14:00 )             35.0       01-26    136  |  101  |  19  ----------------------------<  98  3.9   |  29  |  0.78    Ca    9.2      26 Jan 2024 14:00    TPro  7.6  /  Alb  3.5  /  TBili  0.6  /  DBili  x   /  AST  22  /  ALT  14  /  AlkPhos  89  01-26              Urinalysis Basic - ( 26 Jan 2024 14:00 )    Color: x / Appearance: x / SG: x / pH: x  Gluc: 98 mg/dL / Ketone: x  / Bili: x / Urobili: x   Blood: x / Protein: x / Nitrite: x   Leuk Esterase: x / RBC: x / WBC x   Sq Epi: x / Non Sq Epi: x / Bacteria: x    ----------------------------------------------------------------  RADIOLOGY:    CT C-Spine:    IMPRESSION 12/20/23:    Cervical spine CT: No acute fractures or dislocations.    Multilevel cervical spondylosis.    Head CT: No acute intracranial hemorrhage, mass effect, or shift of the   midline structures.    --- End of Report ---        XR Chest AP or PA 1V:       IMPRESSION 08/29/23:   Slight lingular atelectases at this time.    --- End of Report ---          --------------------------------------------------------------      ECHO 07/09/23:      Summary:   1. Marked biatrial enlargement   2. Left ventricular ejection fraction, by visual estimation, is 55 to   60%.   3. Normal global left ventricular systolic function.   4. The left ventricular diastolic function could not be assessed in this   study.   5. Mildly enlarged right ventricle.   6. Moderate mitral annular calcification.   7. Mild mitral valve regurgitation.   8. Thickening of the anterior and posterior mitral valve leaflets.   9. Mild-moderate tricuspid regurgitation.  10. Mild pulmonic valve regurgitation.  -----------------------------------------------------------------------------------    EKG 8/29/23:     Diagnosis Line Atrial fibrillation  Incomplete right bundle branch block  Nonspecific ST abnormality  Abnormal ECG  When compared with ECG of 08-JUL-2023 11:24,  Premature ventricular complexes are no longer present  Confirmed by Margarito Márquez ([id number]) on 8/30/2023 7:34:00 AM

## 2024-01-26 NOTE — ED PROVIDER NOTE - OBJECTIVE STATEMENT
87 yo female PMHx dementia was brought in to ED by EMS from [facility]. Pt states she does not know why she is here and feels fine. Endourses to only having a slight runny nose x 2 days. Denies fever, chills, cough, headache, dizziness,  N/V/D, abdominal pain. 89 yo female PMHx dementia was brought into ED by EMS from [facility]. Pt states she does not know why she is here and feels fine. Endorses to only having a slight runny nose x 2 days. Denies fever, chills, cough, headache, dizziness,  N/V/D, abdominal pain. 87 yo female PMHx afib, dementia was brought into ED by EMS from shelter. Pt states she does not know why she is here and feels fine. Endorses to only having a slight runny nose x 2 days. Denies fever, chills, cough, headache, dizziness,  N/V/D, abdominal pain.

## 2024-01-26 NOTE — H&P ADULT - NS ATTEND AMEND GEN_ALL_CORE FT
89 y/o F PMH dementia, chronic a fib (on Eliquis), diastolic CHF admitted for Influenzae A, unable to return to residential due to residential protocol. Start tamiflu, afebrile, hemodynamically stable. Dispo planning - SW appreciated for assistance.

## 2024-01-26 NOTE — PATIENT PROFILE ADULT - FALL HARM RISK - HARM RISK INTERVENTIONS

## 2024-01-27 LAB
ALBUMIN SERPL ELPH-MCNC: 3 G/DL — LOW (ref 3.3–5)
ALP SERPL-CCNC: 72 U/L — SIGNIFICANT CHANGE UP (ref 40–120)
ALT FLD-CCNC: 16 U/L — SIGNIFICANT CHANGE UP (ref 10–45)
ANION GAP SERPL CALC-SCNC: 9 MMOL/L — SIGNIFICANT CHANGE UP (ref 5–17)
APPEARANCE UR: CLEAR — SIGNIFICANT CHANGE UP
AST SERPL-CCNC: 36 U/L — SIGNIFICANT CHANGE UP (ref 10–40)
BASOPHILS # BLD AUTO: 0.06 K/UL — SIGNIFICANT CHANGE UP (ref 0–0.2)
BASOPHILS NFR BLD AUTO: 0.6 % — SIGNIFICANT CHANGE UP (ref 0–2)
BILIRUB SERPL-MCNC: 0.9 MG/DL — SIGNIFICANT CHANGE UP (ref 0.2–1.2)
BILIRUB UR-MCNC: NEGATIVE — SIGNIFICANT CHANGE UP
BUN SERPL-MCNC: 16 MG/DL — SIGNIFICANT CHANGE UP (ref 7–23)
CALCIUM SERPL-MCNC: 9.1 MG/DL — SIGNIFICANT CHANGE UP (ref 8.4–10.5)
CHLORIDE SERPL-SCNC: 101 MMOL/L — SIGNIFICANT CHANGE UP (ref 96–108)
CO2 SERPL-SCNC: 24 MMOL/L — SIGNIFICANT CHANGE UP (ref 22–31)
COLOR SPEC: YELLOW — SIGNIFICANT CHANGE UP
CREAT SERPL-MCNC: 0.47 MG/DL — LOW (ref 0.5–1.3)
DIFF PNL FLD: NEGATIVE — SIGNIFICANT CHANGE UP
EGFR: 91 ML/MIN/1.73M2 — SIGNIFICANT CHANGE UP
EOSINOPHIL # BLD AUTO: 0.01 K/UL — SIGNIFICANT CHANGE UP (ref 0–0.5)
EOSINOPHIL NFR BLD AUTO: 0.1 % — SIGNIFICANT CHANGE UP (ref 0–6)
GLUCOSE SERPL-MCNC: 89 MG/DL — SIGNIFICANT CHANGE UP (ref 70–99)
GLUCOSE UR QL: NEGATIVE MG/DL — SIGNIFICANT CHANGE UP
HCT VFR BLD CALC: 39.6 % — SIGNIFICANT CHANGE UP (ref 34.5–45)
HGB BLD-MCNC: 12.3 G/DL — SIGNIFICANT CHANGE UP (ref 11.5–15.5)
IMM GRANULOCYTES NFR BLD AUTO: 0.6 % — SIGNIFICANT CHANGE UP (ref 0–0.9)
KETONES UR-MCNC: NEGATIVE MG/DL — SIGNIFICANT CHANGE UP
LEUKOCYTE ESTERASE UR-ACNC: NEGATIVE — SIGNIFICANT CHANGE UP
LYMPHOCYTES # BLD AUTO: 0.9 K/UL — LOW (ref 1–3.3)
LYMPHOCYTES # BLD AUTO: 9 % — LOW (ref 13–44)
MCHC RBC-ENTMCNC: 29.8 PG — SIGNIFICANT CHANGE UP (ref 27–34)
MCHC RBC-ENTMCNC: 31.1 GM/DL — LOW (ref 32–36)
MCV RBC AUTO: 95.6 FL — SIGNIFICANT CHANGE UP (ref 80–100)
MONOCYTES # BLD AUTO: 0.8 K/UL — SIGNIFICANT CHANGE UP (ref 0–0.9)
MONOCYTES NFR BLD AUTO: 8 % — SIGNIFICANT CHANGE UP (ref 2–14)
NEUTROPHILS # BLD AUTO: 8.22 K/UL — HIGH (ref 1.8–7.4)
NEUTROPHILS NFR BLD AUTO: 81.7 % — HIGH (ref 43–77)
NITRITE UR-MCNC: NEGATIVE — SIGNIFICANT CHANGE UP
NRBC # BLD: 0 /100 WBCS — SIGNIFICANT CHANGE UP (ref 0–0)
PH UR: 7 — SIGNIFICANT CHANGE UP (ref 5–8)
PLATELET # BLD AUTO: 146 K/UL — LOW (ref 150–400)
POTASSIUM SERPL-MCNC: 4.2 MMOL/L — SIGNIFICANT CHANGE UP (ref 3.5–5.3)
POTASSIUM SERPL-SCNC: 4.2 MMOL/L — SIGNIFICANT CHANGE UP (ref 3.5–5.3)
PROT SERPL-MCNC: 7.1 G/DL — SIGNIFICANT CHANGE UP (ref 6–8.3)
PROT UR-MCNC: SIGNIFICANT CHANGE UP MG/DL
RBC # BLD: 4.13 M/UL — SIGNIFICANT CHANGE UP (ref 3.8–5.2)
RBC # FLD: 17 % — HIGH (ref 10.3–14.5)
SODIUM SERPL-SCNC: 134 MMOL/L — LOW (ref 135–145)
SP GR SPEC: 1.01 — SIGNIFICANT CHANGE UP (ref 1–1.03)
UROBILINOGEN FLD QL: 0.2 MG/DL — SIGNIFICANT CHANGE UP (ref 0.2–1)
WBC # BLD: 10.05 K/UL — SIGNIFICANT CHANGE UP (ref 3.8–10.5)
WBC # FLD AUTO: 10.05 K/UL — SIGNIFICANT CHANGE UP (ref 3.8–10.5)

## 2024-01-27 PROCEDURE — 99233 SBSQ HOSP IP/OBS HIGH 50: CPT | Mod: GC

## 2024-01-27 PROCEDURE — 71045 X-RAY EXAM CHEST 1 VIEW: CPT | Mod: 26

## 2024-01-27 RX ORDER — METOPROLOL TARTRATE 50 MG
25 TABLET ORAL DAILY
Refills: 0 | Status: DISCONTINUED | OUTPATIENT
Start: 2024-01-27 | End: 2024-01-31

## 2024-01-27 RX ORDER — SODIUM CHLORIDE 9 MG/ML
1000 INJECTION INTRAMUSCULAR; INTRAVENOUS; SUBCUTANEOUS ONCE
Refills: 0 | Status: COMPLETED | OUTPATIENT
Start: 2024-01-27 | End: 2024-01-28

## 2024-01-27 RX ADMIN — TRAMADOL HYDROCHLORIDE 50 MILLIGRAM(S): 50 TABLET ORAL at 23:44

## 2024-01-27 RX ADMIN — Medication 3 MILLIGRAM(S): at 22:44

## 2024-01-27 RX ADMIN — Medication 650 MILLIGRAM(S): at 11:35

## 2024-01-27 RX ADMIN — TRAMADOL HYDROCHLORIDE 50 MILLIGRAM(S): 50 TABLET ORAL at 22:44

## 2024-01-27 RX ADMIN — PREGABALIN 1000 MICROGRAM(S): 225 CAPSULE ORAL at 11:34

## 2024-01-27 RX ADMIN — APIXABAN 2.5 MILLIGRAM(S): 2.5 TABLET, FILM COATED ORAL at 07:15

## 2024-01-27 RX ADMIN — APIXABAN 2.5 MILLIGRAM(S): 2.5 TABLET, FILM COATED ORAL at 17:33

## 2024-01-27 RX ADMIN — Medication 25 MILLIGRAM(S): at 22:45

## 2024-01-27 RX ADMIN — Medication 30 MILLIGRAM(S): at 07:15

## 2024-01-27 RX ADMIN — Medication 30 MILLIGRAM(S): at 17:33

## 2024-01-27 RX ADMIN — TRAMADOL HYDROCHLORIDE 50 MILLIGRAM(S): 50 TABLET ORAL at 14:00

## 2024-01-27 RX ADMIN — TRAMADOL HYDROCHLORIDE 50 MILLIGRAM(S): 50 TABLET ORAL at 13:44

## 2024-01-27 RX ADMIN — TRAMADOL HYDROCHLORIDE 50 MILLIGRAM(S): 50 TABLET ORAL at 07:18

## 2024-01-27 RX ADMIN — Medication 20 MILLIGRAM(S): at 07:15

## 2024-01-27 RX ADMIN — Medication 1 TABLET(S): at 11:34

## 2024-01-27 RX ADMIN — POLYETHYLENE GLYCOL 3350 17 GRAM(S): 17 POWDER, FOR SOLUTION ORAL at 11:35

## 2024-01-27 RX ADMIN — SENNA PLUS 2 TABLET(S): 8.6 TABLET ORAL at 22:44

## 2024-01-27 RX ADMIN — Medication 25 MILLIGRAM(S): at 07:15

## 2024-01-27 NOTE — PROGRESS NOTE ADULT - ASSESSMENT
88 year old F PMH dementia, chronic a fib (on Eliquis), diastolic CHF admitted for Influenzae A, unable to return to [facility] due to [facility] protocol.     #Cough/ Flu A +  -Admit to medicine floors  -Respirations comfortable on room air  -Continue pulse ox   -Maintain on droplet isolation (Day 2/5)  -Tamiflu 75mg BID x 5 days  -Goals of Care discussion had with patient/surrogate: DNR/DNI  -[facility] reportedly unable to accept patient until isolation complete - SW appreciated  - Fevers today to 102.4, afebrile on admission. Tylenol PRN  - f/u blood cx, urine cx, UA and repeat CXR    #Chronic CHFpEF  -Appears clinically euvolemic  -Continue lasix 20 daily, metoprolol xl 25mg daily     #HTN, chronic, stable  -continue metoprolol xl 25mg daily    #Chronic Atrial Fibrillation  -Rate controlled with toprol XL 25mg  -Continue Eliqius 2.5mg 1 tablet 2x daily for AC    #Health Maintenance  -Multivitamin  1 tablet daily  -Cyanocobalamin 1000mcg, 1 tablet daily    #DVT PPx   -On eliquis 2.5mg 1 tablet 2x daily    Daughter Silvia: [phone], Spoke to daughter today, updated on plan    Patient unable to return to [facility] x 5 days due to Flu A+ status -reportedly unable to quarantine at [facility]. As per [facility]- will need to complete 5 day isolation. SW consult appreciated for dispo planning

## 2024-01-27 NOTE — PROGRESS NOTE ADULT - SUBJECTIVE AND OBJECTIVE BOX
HPI:  Ms. Rey is a 88 year old female with a PMHx: Dementia, Afib, T12 compression fracture, mitral valve replacement,  brought in via EMS to the ED today from [facility]  assisted living facility. Pt is A+OX1 at baseline and a poor historian. She does not know why she is here and states she feels fine. Endorses only rhinorrhea x 2-3days. Pt denies F/C/N/V, diarrhea, CP, SOB, dizziness, headaches or abdominal pain.  (26 Jan 2024 17:01)      SUBJECTIVE:   Patient seen and Examined at bedside.     REVIEW OF SYSTEMS:  CONSTITUTIONAL: No weakness, fevers or chills  EYES/ENT: No visual changes;  No vertigo or throat pain   NECK: No pain or stiffness  RESPIRATORY: No cough, wheezing, hemoptysis; No shortness of breath  CARDIOVASCULAR: No chest pain or palpitations  GASTROINTESTINAL: No abdominal or epigastric pain. No nausea, vomiting, or hematemesis; No diarrhea or constipation. No melena or hematochezia.  GENITOURINARY: No dysuria, frequency or hematuria  NEUROLOGICAL: No numbness or weakness  SKIN: No itching, burning, rashes, or lesions   All other review of systems is negative unless indicated above    Vital Signs Last 24 Hrs  T(C): 37 (26 Jan 2024 20:30), Max: 37 (26 Jan 2024 20:30)  T(F): 98.6 (26 Jan 2024 20:30), Max: 98.6 (26 Jan 2024 20:30)  HR: 70 (26 Jan 2024 20:30) (70 - 73)  BP: 115/57 (26 Jan 2024 20:30) (115/57 - 135/95)  BP(mean): --  RR: 17 (26 Jan 2024 20:30) (17 - 18)  SpO2: 93% (26 Jan 2024 20:30) (93% - 97%)    Parameters below as of 26 Jan 2024 20:30  Patient On (Oxygen Delivery Method): room air        I&O's Summary      CAPILLARY BLOOD GLUCOSE          PHYSICAL EXAM:  Constitutional: NAD, awake and alert, well-developed  HEENT: PERR, EOMI, Normal Hearing, MMM  Neck: Soft and supple, No LAD, No JVD  Respiratory: Breath sounds are clear bilaterally, No wheezing, rales or rhonchi  Cardiovascular: S1 and S2, regular rate and rhythm, no Murmurs, gallops or rubs  Gastrointestinal: Bowel Sounds present, soft, nontender, nondistended, no guarding, no rebound  Extremities: No peripheral edema  Vascular: 2+ peripheral pulses  Neurological: A/O x 3, no focal deficits  Musculoskeletal: 5/5 strength b/l upper and lower extremities  Skin: No rashes    MEDICATIONS:  MEDICATIONS  (STANDING):  apixaban 2.5 milliGRAM(s) Oral two times a day  cyanocobalamin 1000 MICROGram(s) Oral daily  furosemide    Tablet 20 milliGRAM(s) Oral daily  melatonin 3 milliGRAM(s) Oral at bedtime  metoprolol succinate ER 25 milliGRAM(s) Oral daily  multivitamin 1 Tablet(s) Oral daily  oseltamivir 30 milliGRAM(s) Oral two times a day  oseltamivir 75 milliGRAM(s) Oral once  polyethylene glycol 3350 17 Gram(s) Oral daily  senna 2 Tablet(s) Oral at bedtime  traMADol 50 milliGRAM(s) Oral three times a day      LABS: All Labs Reviewed:                        11.7   7.63  )-----------( 179      ( 26 Jan 2024 14:00 )             35.0     01-26    136  |  101  |  19  ----------------------------<  98  3.9   |  29  |  0.78    Ca    9.2      26 Jan 2024 14:00    TPro  7.6  /  Alb  3.5  /  TBili  0.6  /  DBili  x   /  AST  22  /  ALT  14  /  AlkPhos  89  01-26          Blood Culture:     RADIOLOGY/EKG:   HPI:  Ms. Rey is a 88 year old female with a PMHx: Dementia, Afib, T12 compression fracture, mitral valve replacement,  brought in via EMS to the ED today from [facility]  assisted living facility. Pt is A+OX1 at baseline and a poor historian. She does not know why she is here and states she feels fine. Endorses only rhinorrhea x 2-3days. Pt denies F/C/N/V, diarrhea, CP, SOB, dizziness, headaches or abdominal pain.  (26 Jan 2024 17:01)      SUBJECTIVE:   1/27: Patient seen and Examined at bedside. Pt non verbal during time of exam      Vital Signs Last 24 Hrs  T(C): 37 (26 Jan 2024 20:30), Max: 37 (26 Jan 2024 20:30)  T(F): 98.6 (26 Jan 2024 20:30), Max: 98.6 (26 Jan 2024 20:30)  HR: 70 (26 Jan 2024 20:30) (70 - 73)  BP: 115/57 (26 Jan 2024 20:30) (115/57 - 135/95)  BP(mean): --  RR: 17 (26 Jan 2024 20:30) (17 - 18)  SpO2: 93% (26 Jan 2024 20:30) (93% - 97%)    Parameters below as of 26 Jan 2024 20:30  Patient On (Oxygen Delivery Method): room air        PHYSICAL EXAM:  GENERAL: NAD, well-developed.   HEAD:  Atraumatic, Normocephalic  EYES: EOMI, conjunctiva and sclera clear  ENMT: Moist mucous membranes, no thrush  NECK: Supple, No JVD  CHEST/LUNG: +course posterior breath sounds , good air entry, non-labored breathing.  HEART: + Murmur, S1/S2,   ABDOMEN: Rounded, Soft, Nontender, Bowel sounds present  VASCULAR: Normal pulses, Normal capillary refill  EXTREMITIES: No calf tenderness, No cyanosis, No edema  SKIN: Warm, Intact  PSYCH: +Demented, calm.   NERVOUS SYSTEM:  A/O x1,  No focal deficits noted.    MEDICATIONS:  MEDICATIONS  (STANDING):  apixaban 2.5 milliGRAM(s) Oral two times a day  cyanocobalamin 1000 MICROGram(s) Oral daily  furosemide    Tablet 20 milliGRAM(s) Oral daily  melatonin 3 milliGRAM(s) Oral at bedtime  metoprolol succinate ER 25 milliGRAM(s) Oral daily  multivitamin 1 Tablet(s) Oral daily  oseltamivir 30 milliGRAM(s) Oral two times a day  oseltamivir 75 milliGRAM(s) Oral once  polyethylene glycol 3350 17 Gram(s) Oral daily  senna 2 Tablet(s) Oral at bedtime  traMADol 50 milliGRAM(s) Oral three times a day      LABS: All Labs Reviewed:                        11.7   7.63  )-----------( 179      ( 26 Jan 2024 14:00 )             35.0     01-26    136  |  101  |  19  ----------------------------<  98  3.9   |  29  |  0.78    Ca    9.2      26 Jan 2024 14:00    TPro  7.6  /  Alb  3.5  /  TBili  0.6  /  DBili  x   /  AST  22  /  ALT  14  /  AlkPhos  89  01-26      Blood Culture:     RADIOLOGY/EKG:  < from: Xray Chest 1 View- PORTABLE-Urgent (Xray Chest 1 View- PORTABLE-Urgent .) (01.26.24 @ 13:15) >  IMPRESSION: No change. No acute parenchymal disease      < end of copied text >

## 2024-01-28 LAB
ANION GAP SERPL CALC-SCNC: 5 MMOL/L — SIGNIFICANT CHANGE UP (ref 5–17)
BUN SERPL-MCNC: 30 MG/DL — HIGH (ref 7–23)
CALCIUM SERPL-MCNC: 8.6 MG/DL — SIGNIFICANT CHANGE UP (ref 8.4–10.5)
CHLORIDE SERPL-SCNC: 104 MMOL/L — SIGNIFICANT CHANGE UP (ref 96–108)
CO2 SERPL-SCNC: 29 MMOL/L — SIGNIFICANT CHANGE UP (ref 22–31)
CREAT SERPL-MCNC: 0.85 MG/DL — SIGNIFICANT CHANGE UP (ref 0.5–1.3)
EGFR: 66 ML/MIN/1.73M2 — SIGNIFICANT CHANGE UP
GLUCOSE SERPL-MCNC: 98 MG/DL — SIGNIFICANT CHANGE UP (ref 70–99)
HCT VFR BLD CALC: 32.7 % — LOW (ref 34.5–45)
HGB BLD-MCNC: 10.8 G/DL — LOW (ref 11.5–15.5)
MCHC RBC-ENTMCNC: 29 PG — SIGNIFICANT CHANGE UP (ref 27–34)
MCHC RBC-ENTMCNC: 33 GM/DL — SIGNIFICANT CHANGE UP (ref 32–36)
MCV RBC AUTO: 87.9 FL — SIGNIFICANT CHANGE UP (ref 80–100)
NRBC # BLD: 0 /100 WBCS — SIGNIFICANT CHANGE UP (ref 0–0)
PLATELET # BLD AUTO: 149 K/UL — LOW (ref 150–400)
POTASSIUM SERPL-MCNC: 4.3 MMOL/L — SIGNIFICANT CHANGE UP (ref 3.5–5.3)
POTASSIUM SERPL-SCNC: 4.3 MMOL/L — SIGNIFICANT CHANGE UP (ref 3.5–5.3)
RBC # BLD: 3.72 M/UL — LOW (ref 3.8–5.2)
RBC # FLD: 16.8 % — HIGH (ref 10.3–14.5)
SODIUM SERPL-SCNC: 138 MMOL/L — SIGNIFICANT CHANGE UP (ref 135–145)
WBC # BLD: 5.79 K/UL — SIGNIFICANT CHANGE UP (ref 3.8–10.5)
WBC # FLD AUTO: 5.79 K/UL — SIGNIFICANT CHANGE UP (ref 3.8–10.5)

## 2024-01-28 PROCEDURE — 99233 SBSQ HOSP IP/OBS HIGH 50: CPT | Mod: GC

## 2024-01-28 RX ORDER — OLANZAPINE 15 MG/1
2.5 TABLET, FILM COATED ORAL ONCE
Refills: 0 | Status: COMPLETED | OUTPATIENT
Start: 2024-01-28 | End: 2024-01-28

## 2024-01-28 RX ADMIN — TRAMADOL HYDROCHLORIDE 50 MILLIGRAM(S): 50 TABLET ORAL at 21:13

## 2024-01-28 RX ADMIN — TRAMADOL HYDROCHLORIDE 50 MILLIGRAM(S): 50 TABLET ORAL at 21:50

## 2024-01-28 RX ADMIN — TRAMADOL HYDROCHLORIDE 50 MILLIGRAM(S): 50 TABLET ORAL at 14:29

## 2024-01-28 RX ADMIN — SODIUM CHLORIDE 1000 MILLILITER(S): 9 INJECTION INTRAMUSCULAR; INTRAVENOUS; SUBCUTANEOUS at 13:00

## 2024-01-28 RX ADMIN — PREGABALIN 1000 MICROGRAM(S): 225 CAPSULE ORAL at 12:59

## 2024-01-28 RX ADMIN — Medication 30 MILLIGRAM(S): at 07:03

## 2024-01-28 RX ADMIN — POLYETHYLENE GLYCOL 3350 17 GRAM(S): 17 POWDER, FOR SOLUTION ORAL at 13:00

## 2024-01-28 RX ADMIN — TRAMADOL HYDROCHLORIDE 50 MILLIGRAM(S): 50 TABLET ORAL at 07:03

## 2024-01-28 RX ADMIN — Medication 30 MILLIGRAM(S): at 17:34

## 2024-01-28 RX ADMIN — Medication 100 MILLIGRAM(S): at 17:34

## 2024-01-28 RX ADMIN — Medication 25 MILLIGRAM(S): at 07:03

## 2024-01-28 RX ADMIN — Medication 20 MILLIGRAM(S): at 07:03

## 2024-01-28 RX ADMIN — Medication 1 TABLET(S): at 12:59

## 2024-01-28 RX ADMIN — Medication 3 MILLIGRAM(S): at 21:13

## 2024-01-28 RX ADMIN — Medication 75 MILLIGRAM(S): at 22:42

## 2024-01-28 RX ADMIN — APIXABAN 2.5 MILLIGRAM(S): 2.5 TABLET, FILM COATED ORAL at 07:03

## 2024-01-28 RX ADMIN — OLANZAPINE 2.5 MILLIGRAM(S): 15 TABLET, FILM COATED ORAL at 22:41

## 2024-01-28 RX ADMIN — TRAMADOL HYDROCHLORIDE 50 MILLIGRAM(S): 50 TABLET ORAL at 13:29

## 2024-01-28 RX ADMIN — APIXABAN 2.5 MILLIGRAM(S): 2.5 TABLET, FILM COATED ORAL at 17:34

## 2024-01-28 NOTE — PROGRESS NOTE ADULT - ASSESSMENT
88 year old F PMH dementia, chronic a fib (on Eliquis), diastolic CHF admitted for Influenzae A, unable to return to [facility] due to [facility] protocol.     #Cough/ Flu A +  -Admit to medicine floors  -Respirations comfortable on room air  -Continue pulse ox   -Maintain on droplet isolation (Day 2/5)  -Tamiflu 75mg BID x 5 days  -Goals of Care discussion had with patient/surrogate: DNR/DNI  -[facility] reportedly unable to accept patient until isolation complete - SW appreciated  - no longer febrile  - f/u blood cx, urine cx, UA and repeat CXR    #Chronic CHFpEF  -Appears clinically euvolemic  -Continue lasix 20 daily, metoprolol xl 25mg daily     #HTN, chronic, stable  -continue metoprolol xl 25mg daily    #Chronic Atrial Fibrillation  -Rate controlled with toprol XL 25mg  -Continue Eliqius 2.5mg 1 tablet 2x daily for AC    #Health Maintenance  -Multivitamin  1 tablet daily  -Cyanocobalamin 1000mcg, 1 tablet daily    #DVT PPx   -On eliquis 2.5mg 1 tablet 2x daily      Patient unable to return to [facility] x 5 days due to Flu A+ status -reportedly unable to quarantine at [facility]. As per [facility]- will need to complete 5 day isolation. SW consult appreciated for dispo planning. likely dc 1/31

## 2024-01-28 NOTE — PROGRESS NOTE ADULT - SUBJECTIVE AND OBJECTIVE BOX
HPI:  Ms. Rey is a 88 year old female with a PMHx: Dementia, Afib, T12 compression fracture, mitral valve replacement,  brought in via EMS to the ED today from [facility]  assisted living facility. Pt is A+OX1 at baseline and a poor historian. She does not know why she is here and states she feels fine. Endorses only rhinorrhea x 2-3days. Pt denies F/C/N/V, diarrhea, CP, SOB, dizziness, headaches or abdominal pain.  (26 Jan 2024 17:01)      SUBJECTIVE:   1/28: Patient seen and Examined at bedside, patient appears well and has no acute complaints at this time.       Vital Signs Last 24 Hrs  T(C): 37.2 (28 Jan 2024 05:53), Max: 39.1 (27 Jan 2024 11:20)  T(F): 98.9 (28 Jan 2024 05:53), Max: 102.4 (27 Jan 2024 11:20)  HR: 93 (28 Jan 2024 05:53) (68 - 93)  BP: 97/53 (28 Jan 2024 05:53) (90/53 - 114/80)  BP(mean): --  RR: 20 (28 Jan 2024 05:53) (20 - 28)  SpO2: 95% (28 Jan 2024 05:53) (90% - 95%)    Parameters below as of 28 Jan 2024 05:53  Patient On (Oxygen Delivery Method): room air          PHYSICAL EXAM:  GENERAL: NAD, well-developed.   HEAD:  Atraumatic, Normocephalic  EYES: EOMI, conjunctiva and sclera clear  ENMT: Moist mucous membranes, no thrush  NECK: Supple, No JVD  CHEST/LUNG: +course posterior breath sounds , good air entry, non-labored breathing.  HEART: + Murmur, S1/S2,   ABDOMEN: Rounded, Soft, Nontender, Bowel sounds present  VASCULAR: Normal pulses, Normal capillary refill  EXTREMITIES: No calf tenderness, No cyanosis, No edema  SKIN: Warm, Intact  PSYCH: +Demented, calm.   NERVOUS SYSTEM:  A/O x1,  No focal deficits noted.    MEDICATIONS:  MEDICATIONS  (STANDING):  apixaban 2.5 milliGRAM(s) Oral two times a day  cyanocobalamin 1000 MICROGram(s) Oral daily  furosemide    Tablet 20 milliGRAM(s) Oral daily  melatonin 3 milliGRAM(s) Oral at bedtime  metoprolol succinate ER 25 milliGRAM(s) Oral daily  multivitamin 1 Tablet(s) Oral daily  oseltamivir 30 milliGRAM(s) Oral two times a day  oseltamivir 75 milliGRAM(s) Oral once  polyethylene glycol 3350 17 Gram(s) Oral daily  senna 2 Tablet(s) Oral at bedtime  traMADol 50 milliGRAM(s) Oral three times a day      LABS: All Labs Reviewed:               LABS:                          10.8   5.79  )-----------( 149      ( 28 Jan 2024 07:30 )             32.7     01-28    138  |  104  |  30<H>  ----------------------------<  98  4.3   |  29  |  0.85    Ca    8.6      28 Jan 2024 07:30    TPro  7.1  /  Alb  3.0<L>  /  TBili  0.9  /  DBili  x   /  AST  36  /  ALT  16  /  AlkPhos  72  01-27    LIVER FUNCTIONS - ( 27 Jan 2024 08:40 )  Alb: 3.0 g/dL / Pro: 7.1 g/dL / ALK PHOS: 72 U/L / ALT: 16 U/L / AST: 36 U/L / GGT: x             Urinalysis Basic - ( 28 Jan 2024 07:30 )    Color: x / Appearance: x / SG: x / pH: x  Gluc: 98 mg/dL / Ketone: x  / Bili: x / Urobili: x   Blood: x / Protein: x / Nitrite: x   Leuk Esterase: x / RBC: x / WBC x   Sq Epi: x / Non Sq Epi: x / Bacteria: x          RADIOLOGY/EKG:  < from: Xray Chest 1 View- PORTABLE-Urgent (Xray Chest 1 View- PORTABLE-Urgent .) (01.26.24 @ 13:15) >  IMPRESSION: No change. No acute parenchymal disease      < end of copied text >

## 2024-01-29 DIAGNOSIS — I48.20 CHRONIC ATRIAL FIBRILLATION, UNSPECIFIED: ICD-10-CM

## 2024-01-29 DIAGNOSIS — I10 ESSENTIAL (PRIMARY) HYPERTENSION: ICD-10-CM

## 2024-01-29 DIAGNOSIS — I50.32 CHRONIC DIASTOLIC (CONGESTIVE) HEART FAILURE: ICD-10-CM

## 2024-01-29 DIAGNOSIS — J10.1 INFLUENZA DUE TO OTHER IDENTIFIED INFLUENZA VIRUS WITH OTHER RESPIRATORY MANIFESTATIONS: ICD-10-CM

## 2024-01-29 DIAGNOSIS — Z29.9 ENCOUNTER FOR PROPHYLACTIC MEASURES, UNSPECIFIED: ICD-10-CM

## 2024-01-29 LAB
ALBUMIN SERPL ELPH-MCNC: 2.5 G/DL — LOW (ref 3.3–5)
ALP SERPL-CCNC: 64 U/L — SIGNIFICANT CHANGE UP (ref 40–120)
ALT FLD-CCNC: 16 U/L — SIGNIFICANT CHANGE UP (ref 10–45)
ANION GAP SERPL CALC-SCNC: 4 MMOL/L — LOW (ref 5–17)
AST SERPL-CCNC: 33 U/L — SIGNIFICANT CHANGE UP (ref 10–40)
BASOPHILS # BLD AUTO: 0.02 K/UL — SIGNIFICANT CHANGE UP (ref 0–0.2)
BASOPHILS NFR BLD AUTO: 0.5 % — SIGNIFICANT CHANGE UP (ref 0–2)
BILIRUB SERPL-MCNC: 0.3 MG/DL — SIGNIFICANT CHANGE UP (ref 0.2–1.2)
BUN SERPL-MCNC: 25 MG/DL — HIGH (ref 7–23)
CALCIUM SERPL-MCNC: 8.5 MG/DL — SIGNIFICANT CHANGE UP (ref 8.4–10.5)
CHLORIDE SERPL-SCNC: 108 MMOL/L — SIGNIFICANT CHANGE UP (ref 96–108)
CO2 SERPL-SCNC: 28 MMOL/L — SIGNIFICANT CHANGE UP (ref 22–31)
CREAT SERPL-MCNC: 0.62 MG/DL — SIGNIFICANT CHANGE UP (ref 0.5–1.3)
EGFR: 85 ML/MIN/1.73M2 — SIGNIFICANT CHANGE UP
EOSINOPHIL # BLD AUTO: 0.14 K/UL — SIGNIFICANT CHANGE UP (ref 0–0.5)
EOSINOPHIL NFR BLD AUTO: 3.5 % — SIGNIFICANT CHANGE UP (ref 0–6)
GLUCOSE SERPL-MCNC: 99 MG/DL — SIGNIFICANT CHANGE UP (ref 70–99)
HCT VFR BLD CALC: 32 % — LOW (ref 34.5–45)
HGB BLD-MCNC: 10.7 G/DL — LOW (ref 11.5–15.5)
IMM GRANULOCYTES NFR BLD AUTO: 0.7 % — SIGNIFICANT CHANGE UP (ref 0–0.9)
LYMPHOCYTES # BLD AUTO: 1.44 K/UL — SIGNIFICANT CHANGE UP (ref 1–3.3)
LYMPHOCYTES # BLD AUTO: 35.9 % — SIGNIFICANT CHANGE UP (ref 13–44)
MCHC RBC-ENTMCNC: 29.6 PG — SIGNIFICANT CHANGE UP (ref 27–34)
MCHC RBC-ENTMCNC: 33.4 GM/DL — SIGNIFICANT CHANGE UP (ref 32–36)
MCV RBC AUTO: 88.6 FL — SIGNIFICANT CHANGE UP (ref 80–100)
MONOCYTES # BLD AUTO: 0.55 K/UL — SIGNIFICANT CHANGE UP (ref 0–0.9)
MONOCYTES NFR BLD AUTO: 13.7 % — SIGNIFICANT CHANGE UP (ref 2–14)
NEUTROPHILS # BLD AUTO: 1.83 K/UL — SIGNIFICANT CHANGE UP (ref 1.8–7.4)
NEUTROPHILS NFR BLD AUTO: 45.7 % — SIGNIFICANT CHANGE UP (ref 43–77)
NRBC # BLD: 0 /100 WBCS — SIGNIFICANT CHANGE UP (ref 0–0)
PLATELET # BLD AUTO: 167 K/UL — SIGNIFICANT CHANGE UP (ref 150–400)
POTASSIUM SERPL-MCNC: 4.2 MMOL/L — SIGNIFICANT CHANGE UP (ref 3.5–5.3)
POTASSIUM SERPL-SCNC: 4.2 MMOL/L — SIGNIFICANT CHANGE UP (ref 3.5–5.3)
PROT SERPL-MCNC: 6.3 G/DL — SIGNIFICANT CHANGE UP (ref 6–8.3)
RBC # BLD: 3.61 M/UL — LOW (ref 3.8–5.2)
RBC # FLD: 16.8 % — HIGH (ref 10.3–14.5)
SODIUM SERPL-SCNC: 140 MMOL/L — SIGNIFICANT CHANGE UP (ref 135–145)
WBC # BLD: 4.01 K/UL — SIGNIFICANT CHANGE UP (ref 3.8–10.5)
WBC # FLD AUTO: 4.01 K/UL — SIGNIFICANT CHANGE UP (ref 3.8–10.5)

## 2024-01-29 PROCEDURE — 99233 SBSQ HOSP IP/OBS HIGH 50: CPT | Mod: GC

## 2024-01-29 RX ORDER — QUETIAPINE FUMARATE 200 MG/1
12.5 TABLET, FILM COATED ORAL ONCE
Refills: 0 | Status: COMPLETED | OUTPATIENT
Start: 2024-01-29 | End: 2024-01-29

## 2024-01-29 RX ORDER — LANOLIN ALCOHOL/MO/W.PET/CERES
6 CREAM (GRAM) TOPICAL ONCE
Refills: 0 | Status: COMPLETED | OUTPATIENT
Start: 2024-01-29 | End: 2024-01-29

## 2024-01-29 RX ADMIN — TRAMADOL HYDROCHLORIDE 50 MILLIGRAM(S): 50 TABLET ORAL at 16:21

## 2024-01-29 RX ADMIN — QUETIAPINE FUMARATE 12.5 MILLIGRAM(S): 200 TABLET, FILM COATED ORAL at 17:53

## 2024-01-29 RX ADMIN — Medication 30 MILLIGRAM(S): at 17:54

## 2024-01-29 RX ADMIN — TRAMADOL HYDROCHLORIDE 50 MILLIGRAM(S): 50 TABLET ORAL at 07:25

## 2024-01-29 RX ADMIN — Medication 30 MILLIGRAM(S): at 07:21

## 2024-01-29 RX ADMIN — TRAMADOL HYDROCHLORIDE 50 MILLIGRAM(S): 50 TABLET ORAL at 07:21

## 2024-01-29 RX ADMIN — Medication 20 MILLIGRAM(S): at 07:21

## 2024-01-29 RX ADMIN — APIXABAN 2.5 MILLIGRAM(S): 2.5 TABLET, FILM COATED ORAL at 17:54

## 2024-01-29 RX ADMIN — PREGABALIN 1000 MICROGRAM(S): 225 CAPSULE ORAL at 11:28

## 2024-01-29 RX ADMIN — Medication 6 MILLIGRAM(S): at 23:20

## 2024-01-29 RX ADMIN — Medication 25 MILLIGRAM(S): at 07:21

## 2024-01-29 RX ADMIN — TRAMADOL HYDROCHLORIDE 50 MILLIGRAM(S): 50 TABLET ORAL at 17:56

## 2024-01-29 RX ADMIN — Medication 1 TABLET(S): at 11:28

## 2024-01-29 RX ADMIN — APIXABAN 2.5 MILLIGRAM(S): 2.5 TABLET, FILM COATED ORAL at 07:21

## 2024-01-29 NOTE — PROGRESS NOTE ADULT - SUBJECTIVE AND OBJECTIVE BOX
Patient is a 88-year-old female with PMHx of dementia, afib, MV replacement, HFpEF, admitted for influenza.    Overnight Events: None  Interval HPI: Today is hospital day 3. Patient seen and examined at bedside.     REVIEW OF SYSTEMS:  CONSTITUTIONAL: (-) weakness, (-) fevers, (-) chills  EYES/ENT: (-) visual changes,  (-) vertigo,  (-) throat pain   NECK:  (-) pain, (-) stiffness  RESPIRATORY:  (-) shortness of breath, (-) cough,  (-) wheezing,  (-) hemoptysis   CARDIOVASCULAR:  (-) chest pain, (-) palpitations  GASTROINTESTINAL:  (-) abdominal or epigastric pain, (-) nausea, (-) vomiting, (-) diarrhea, (-) constipation, (-) melena,  (-) hematemesis,  (-) hematochezia  GENITOURINARY: (-) dysuria, (-) frequency, (-) hematuria  NEUROLOGICAL: (-) numbness, (-) weakness  SKIN: (-) itching, (-) rashes, (-) lesions    Vital Signs Last 24 Hrs  T(C): 36.3 (29 Jan 2024 05:32), Max: 36.7 (28 Jan 2024 13:26)  T(F): 97.4 (29 Jan 2024 05:32), Max: 98 (28 Jan 2024 13:26)  HR: 77 (29 Jan 2024 05:32) (77 - 123)  BP: 107/60 (29 Jan 2024 05:32) (98/62 - 109/65)  BP(mean): --  RR: 18 (29 Jan 2024 05:32) (18 - 18)  SpO2: 94% (29 Jan 2024 05:32) (93% - 94%)    Parameters below as of 29 Jan 2024 05:32  Patient On (Oxygen Delivery Method): room air        PHYSICAL EXAM:  GENERAL: NAD, lying in bed comfortably  HEAD:  Atraumatic, Normocephalic  EYES: EOMI, conjunctiva and sclera clear  ENT: Moist mucous membranes  NECK: Supple, No JVD  CHEST/LUNG: Clear to auscultation bilaterally, good air entry bilaterally; No wheezing, rales, or rhonchi. Unlabored respirations  HEART: Regular rate and rhythm. S1 and S2. No murmurs, rubs, or gallops  ABDOMEN: Soft, Nontender, Nondistended. Bowel sounds present.   EXTREMITIES:  2+ Peripheral Pulses. No clubbing, cyanosis, or edema  NERVOUS SYSTEM:  Alert & Oriented X3, speech clear. No deficits.  MSK: FROM all 4 extremities, full and equal strength  SKIN: No rashes, bruises, or other lesions    LABS:   All Labs Personally Reviewed                         10.8   5.79  )-----------( 149      ( 28 Jan 2024 07:30 )             32.7     01-28    138  |  104  |  30<H>  ----------------------------<  98  4.3   |  29  |  0.85    Ca    8.6      28 Jan 2024 07:30    TPro  7.1  /  Alb  3.0<L>  /  TBili  0.9  /  DBili  x   /  AST  36  /  ALT  16  /  AlkPhos  72  01-27          Blood Culture: 01-27 @ 13:35  Organism --  Gram Stain Blood -- Gram Stain --  Specimen Source .Blood Blood-Peripheral  Culture-Blood --      I&O's Summary    CAPILLARY BLOOD GLUCOSE          RADIOLOGY/EKG:  All Imaging and EKGs Personally Reviewed     MEDICATIONS:  MEDICATIONS  (STANDING):  apixaban 2.5 milliGRAM(s) Oral two times a day  cyanocobalamin 1000 MICROGram(s) Oral daily  furosemide    Tablet 20 milliGRAM(s) Oral daily  melatonin 3 milliGRAM(s) Oral at bedtime  metoprolol succinate ER 25 milliGRAM(s) Oral daily  multivitamin 1 Tablet(s) Oral daily  oseltamivir 30 milliGRAM(s) Oral two times a day  polyethylene glycol 3350 17 Gram(s) Oral daily  senna 2 Tablet(s) Oral at bedtime  traMADol 50 milliGRAM(s) Oral three times a day         Patient is a 88-year-old female from [facility] with PMHx of dementia, afib, MV replacement, HFpEF, admitted for influenza, needs to complete 5d quarantine, today is day 3.    Overnight Events: None  Interval HPI: Today is hospital day 3. Patient seen and examined at bedside. Patient still has cough but states it is better than at the beginning of the admission. Otherwise tolerating diet and voiding appropriately.     REVIEW OF SYSTEMS:  CONSTITUTIONAL: (-) weakness, (-) fevers, (-) chills  EYES/ENT: (-) visual changes,  (-) vertigo,  (-) throat pain   NECK:  (-) pain, (-) stiffness  RESPIRATORY:  (-) shortness of breath, (-) cough,  (-) wheezing,  (-) hemoptysis   CARDIOVASCULAR:  (-) chest pain, (-) palpitations  GASTROINTESTINAL:  (-) abdominal or epigastric pain, (-) nausea, (-) vomiting, (-) diarrhea, (-) constipation, (-) melena,  (-) hematemesis,  (-) hematochezia  GENITOURINARY: (-) dysuria, (-) frequency, (-) hematuria  NEUROLOGICAL: (-) numbness, (-) weakness  SKIN: (-) itching, (-) rashes, (-) lesions    Vital Signs Last 24 Hrs  T(C): 36.3 (29 Jan 2024 05:32), Max: 36.7 (28 Jan 2024 13:26)  T(F): 97.4 (29 Jan 2024 05:32), Max: 98 (28 Jan 2024 13:26)  HR: 77 (29 Jan 2024 05:32) (77 - 123)  BP: 107/60 (29 Jan 2024 05:32) (98/62 - 109/65)  BP(mean): --  RR: 18 (29 Jan 2024 05:32) (18 - 18)  SpO2: 94% (29 Jan 2024 05:32) (93% - 94%)    Parameters below as of 29 Jan 2024 05:32  Patient On (Oxygen Delivery Method): room air      PHYSICAL EXAM:  GENERAL: NAD, lying in bed comfortably  HEAD:  Atraumatic, Normocephalic  EYES: EOMI, conjunctiva and sclera clear  ENT: Moist mucous membranes  NECK: Supple, No JVD  CHEST/LUNG: Clear to auscultation bilaterally, good air entry bilaterally, clearing cough; No wheezing, rales, or rhonchi. Unlabored respirations  HEART: Regular rate and rhythm. S1 and S2. No murmurs, rubs, or gallops  ABDOMEN: Soft, Nontender, Nondistended. Bowel sounds present.   EXTREMITIES:  2+ Peripheral Pulses. No clubbing, cyanosis, or edema  NERVOUS SYSTEM:  Alert & Oriented X3, speech clear. No deficits.  MSK: FROM all 4 extremities, full and equal strength  SKIN: No rashes, bruises, or other lesions    LABS:   All Labs Personally Reviewed                         10.8   5.79  )-----------( 149      ( 28 Jan 2024 07:30 )             32.7     01-28    138  |  104  |  30<H>  ----------------------------<  98  4.3   |  29  |  0.85    Ca    8.6      28 Jan 2024 07:30    TPro  7.1  /  Alb  3.0<L>  /  TBili  0.9  /  DBili  x   /  AST  36  /  ALT  16  /  AlkPhos  72  01-27          Blood Culture: 01-27 @ 13:35  Organism --  Gram Stain Blood -- Gram Stain --  Specimen Source .Blood Blood-Peripheral  Culture-Blood --    I&O's Summary    CAPILLARY BLOOD GLUCOSE      RADIOLOGY/EKG:  All Imaging and EKGs Personally Reviewed     No new imaging or EKGs to review     MEDICATIONS:  MEDICATIONS  (STANDING):  apixaban 2.5 milliGRAM(s) Oral two times a day  cyanocobalamin 1000 MICROGram(s) Oral daily  furosemide    Tablet 20 milliGRAM(s) Oral daily  melatonin 3 milliGRAM(s) Oral at bedtime  metoprolol succinate ER 25 milliGRAM(s) Oral daily  multivitamin 1 Tablet(s) Oral daily  oseltamivir 30 milliGRAM(s) Oral two times a day  polyethylene glycol 3350 17 Gram(s) Oral daily  senna 2 Tablet(s) Oral at bedtime  traMADol 50 milliGRAM(s) Oral three times a day

## 2024-01-29 NOTE — PROGRESS NOTE ADULT - PROBLEM SELECTOR PLAN 1
- W/ associated cough, otherwise CTAB on exam today   - Unable to quarantine at [facility], must complete 5d quarantine at hospital, today is day 3/5  - Blood cultures negative at 24hr   - C/w droplet isolation, tamiflu for 5d

## 2024-01-29 NOTE — DIETITIAN INITIAL EVALUATION ADULT - PERTINENT MEDS FT
MEDICATIONS  (STANDING):  apixaban 2.5 milliGRAM(s) Oral two times a day  cyanocobalamin 1000 MICROGram(s) Oral daily  furosemide    Tablet 20 milliGRAM(s) Oral daily  melatonin 3 milliGRAM(s) Oral at bedtime  metoprolol succinate ER 25 milliGRAM(s) Oral daily  multivitamin 1 Tablet(s) Oral daily  oseltamivir 30 milliGRAM(s) Oral two times a day  polyethylene glycol 3350 17 Gram(s) Oral daily  senna 2 Tablet(s) Oral at bedtime  traMADol 50 milliGRAM(s) Oral three times a day    MEDICATIONS  (PRN):  acetaminophen     Tablet .. 650 milliGRAM(s) Oral every 8 hours PRN Mild Pain (1 - 3)  benzonatate 100 milliGRAM(s) Oral three times a day PRN Cough

## 2024-01-29 NOTE — DIETITIAN INITIAL EVALUATION ADULT - PERTINENT LABORATORY DATA
01-29    140  |  108  |  25<H>  ----------------------------<  99  4.2   |  28  |  0.62    Ca    8.5      29 Jan 2024 09:17    TPro  6.3  /  Alb  2.5<L>  /  TBili  0.3  /  DBili  x   /  AST  33  /  ALT  16  /  AlkPhos  64  01-29

## 2024-01-29 NOTE — DIETITIAN INITIAL EVALUATION ADULT - OTHER INFO
88 year old female with a PMHx: Dementia, Afib, T12 compression fracture, mitral valve replacement,  brought in via EMS to the ED today from [facility] living [facility], Patient A& O x 2 , PO intake noted good this morning consumed ~ 75% of meal , NFPE conducted patient with evidence of moderate protein calore malnutrition (+) muscle wasting / loss of body fat observed , (+) BM (1/28) x 3, patient noted to have received Miralax,  Skin intact .

## 2024-01-29 NOTE — PROGRESS NOTE ADULT - ASSESSMENT
Patient is a 88-year-old female with PMHx of dementia, afib, MV replacement, HFpEF, admitted for influenza.    Discussed with attending, Dr. Woodall. Patient is a 88-year-old female from [facility] with PMHx of dementia, afib, MV replacement, HFpEF, admitted for influenza, needs to complete 5d quarantine, today is day 3. Dispo: D/C 1/31.    Discussed with attending, Dr. Woodall.

## 2024-01-30 ENCOUNTER — TRANSCRIPTION ENCOUNTER (OUTPATIENT)
Age: 89
End: 2024-01-30

## 2024-01-30 LAB
ANION GAP SERPL CALC-SCNC: 5 MMOL/L — SIGNIFICANT CHANGE UP (ref 5–17)
BUN SERPL-MCNC: 29 MG/DL — HIGH (ref 7–23)
CALCIUM SERPL-MCNC: 8.7 MG/DL — SIGNIFICANT CHANGE UP (ref 8.4–10.5)
CHLORIDE SERPL-SCNC: 108 MMOL/L — SIGNIFICANT CHANGE UP (ref 96–108)
CO2 SERPL-SCNC: 29 MMOL/L — SIGNIFICANT CHANGE UP (ref 22–31)
CREAT SERPL-MCNC: 0.54 MG/DL — SIGNIFICANT CHANGE UP (ref 0.5–1.3)
EGFR: 88 ML/MIN/1.73M2 — SIGNIFICANT CHANGE UP
GLUCOSE SERPL-MCNC: 91 MG/DL — SIGNIFICANT CHANGE UP (ref 70–99)
HCT VFR BLD CALC: 29.4 % — LOW (ref 34.5–45)
HGB BLD-MCNC: 9.7 G/DL — LOW (ref 11.5–15.5)
MCHC RBC-ENTMCNC: 29.4 PG — SIGNIFICANT CHANGE UP (ref 27–34)
MCHC RBC-ENTMCNC: 33 GM/DL — SIGNIFICANT CHANGE UP (ref 32–36)
MCV RBC AUTO: 89.1 FL — SIGNIFICANT CHANGE UP (ref 80–100)
NRBC # BLD: 0 /100 WBCS — SIGNIFICANT CHANGE UP (ref 0–0)
PLATELET # BLD AUTO: 156 K/UL — SIGNIFICANT CHANGE UP (ref 150–400)
POTASSIUM SERPL-MCNC: 4 MMOL/L — SIGNIFICANT CHANGE UP (ref 3.5–5.3)
POTASSIUM SERPL-SCNC: 4 MMOL/L — SIGNIFICANT CHANGE UP (ref 3.5–5.3)
RBC # BLD: 3.3 M/UL — LOW (ref 3.8–5.2)
RBC # FLD: 16.8 % — HIGH (ref 10.3–14.5)
SODIUM SERPL-SCNC: 142 MMOL/L — SIGNIFICANT CHANGE UP (ref 135–145)
WBC # BLD: 5.42 K/UL — SIGNIFICANT CHANGE UP (ref 3.8–10.5)
WBC # FLD AUTO: 5.42 K/UL — SIGNIFICANT CHANGE UP (ref 3.8–10.5)

## 2024-01-30 PROCEDURE — 99232 SBSQ HOSP IP/OBS MODERATE 35: CPT | Mod: GC

## 2024-01-30 RX ORDER — PREGABALIN 225 MG/1
1 CAPSULE ORAL
Refills: 0 | DISCHARGE

## 2024-01-30 RX ORDER — FUROSEMIDE 40 MG
1 TABLET ORAL
Refills: 0 | DISCHARGE

## 2024-01-30 RX ORDER — METOPROLOL TARTRATE 50 MG
1 TABLET ORAL
Refills: 0 | DISCHARGE

## 2024-01-30 RX ORDER — CLONAZEPAM 1 MG
0.12 TABLET ORAL AT BEDTIME
Refills: 0 | Status: DISCONTINUED | OUTPATIENT
Start: 2024-01-30 | End: 2024-01-30

## 2024-01-30 RX ORDER — APIXABAN 2.5 MG/1
1 TABLET, FILM COATED ORAL
Refills: 0 | DISCHARGE

## 2024-01-30 RX ORDER — TRAMADOL HYDROCHLORIDE 50 MG/1
50 TABLET ORAL THREE TIMES A DAY
Refills: 0 | Status: DISCONTINUED | OUTPATIENT
Start: 2024-01-30 | End: 2024-01-30

## 2024-01-30 RX ORDER — TRAMADOL HYDROCHLORIDE 50 MG/1
50 TABLET ORAL THREE TIMES A DAY
Refills: 0 | Status: DISCONTINUED | OUTPATIENT
Start: 2024-01-30 | End: 2024-01-31

## 2024-01-30 RX ADMIN — TRAMADOL HYDROCHLORIDE 50 MILLIGRAM(S): 50 TABLET ORAL at 06:55

## 2024-01-30 RX ADMIN — Medication 25 MILLIGRAM(S): at 06:02

## 2024-01-30 RX ADMIN — APIXABAN 2.5 MILLIGRAM(S): 2.5 TABLET, FILM COATED ORAL at 06:02

## 2024-01-30 RX ADMIN — Medication 30 MILLIGRAM(S): at 06:02

## 2024-01-30 RX ADMIN — Medication 20 MILLIGRAM(S): at 06:02

## 2024-01-30 RX ADMIN — TRAMADOL HYDROCHLORIDE 50 MILLIGRAM(S): 50 TABLET ORAL at 06:07

## 2024-01-30 RX ADMIN — APIXABAN 2.5 MILLIGRAM(S): 2.5 TABLET, FILM COATED ORAL at 17:09

## 2024-01-30 RX ADMIN — PREGABALIN 1000 MICROGRAM(S): 225 CAPSULE ORAL at 11:25

## 2024-01-30 RX ADMIN — Medication 1 TABLET(S): at 11:25

## 2024-01-30 RX ADMIN — Medication 0.5 MILLIGRAM(S): at 17:09

## 2024-01-30 RX ADMIN — Medication 30 MILLIGRAM(S): at 17:08

## 2024-01-30 NOTE — DISCHARGE NOTE PROVIDER - NSDCMRMEDTOKEN_GEN_ALL_CORE_FT
cyanocobalamin 1000 mcg oral tablet: 1 tab(s) orally once a day  Eliquis 2.5 mg oral tablet: 1 tab(s) orally 2 times a day  furosemide 20 mg oral tablet: 1 tab(s) orally once a day  metoprolol succinate 25 mg oral tablet, extended release: 1 tab(s) orally once a day  Multiple Vitamins oral tablet: 1 tab(s) orally once a day  traMADol 50 mg oral tablet: 1 tab(s) orally 3 times a day

## 2024-01-30 NOTE — PHARMACOTHERAPY INTERVENTION NOTE - COMMENTS
obtained medication history from Dr. Naranjo as med list from Assisted Living was last updated in Feb 2023

## 2024-01-30 NOTE — PROGRESS NOTE ADULT - ASSESSMENT
Patient is a 88-year-old female from [facility] with PMHx of dementia, afib, MV replacement, HFpEF, admitted for influenza, needs to complete 5d quarantine, today is day 4. Dispo: D/C 1/31.    Discussed with attending, Dr. Woodall.

## 2024-01-30 NOTE — DISCHARGE NOTE PROVIDER - ATTENDING DISCHARGE PHYSICAL EXAMINATION:
GENERAL: NAD, lying in bed comfortably  HEAD:  Atraumatic, Normocephalic  EYES: EOMI, conjunctiva and sclera clear  ENT: Moist mucous membranes  NECK: Supple, No JVD  CHEST/LUNG: Clear to auscultation bilaterally, good air entry bilaterally, clearing cough; No wheezing, rales, or rhonchi. Unlabored respirations  HEART: Regular rate and rhythm. S1 and S2. No murmurs, rubs, or gallops  ABDOMEN: Soft, Nontender, Nondistended. Bowel sounds present.   EXTREMITIES:  2+ Peripheral Pulses. No clubbing, cyanosis, or edema  NERVOUS SYSTEM:  Alert & Oriented X2, speech clear. No deficits.  SKIN: No rashes, bruises, or other lesions

## 2024-01-30 NOTE — DISCHARGE NOTE PROVIDER - CARE PROVIDER_API CALL
George Dewitt  [address] [address]  Phone: [phone]  Fax: [phone]  Established Patient  Follow Up Time: 1 week

## 2024-01-30 NOTE — DISCHARGE NOTE PROVIDER - NSDCCAREPROVSEEN_GEN_ALL_CORE_FT
Susannah, Luis Morse, Salo Dewitt, George Stock, Mikki Woodall, David Astudillo, Maikel Price, Mica Rivera, Gricelda

## 2024-01-30 NOTE — DISCHARGE NOTE PROVIDER - DETAILS OF MALNUTRITION DIAGNOSIS/DIAGNOSES
This patient has been assessed with a concern for Malnutrition and was treated during this hospitalization for the following Nutrition diagnosis/diagnoses:     -  01/29/2024: Moderate protein-calorie malnutrition   -  01/29/2024: Underweight (BMI < 19)

## 2024-01-30 NOTE — PROGRESS NOTE ADULT - TIME BILLING
care coordination, plan of care discussed with 3E weekend IDR team
care coordination, plan of care discussed with 3E weekend IDR team
care coordination, plan of care discussed with 3E  IDR team
care coordination, plan of care discussed with 3E  IDR team

## 2024-01-30 NOTE — DISCHARGE NOTE PROVIDER - NSDCCPCAREPLAN_GEN_ALL_CORE_FT
PRINCIPAL DISCHARGE DIAGNOSIS  Diagnosis: Influenza A  Assessment and Plan of Treatment: You were hospitalized for influenza A infection. You were treated with a 5 day course of tamiflu and completed the 5 day isolation period. Please treat any residual symptoms with supportive care. Stay hydrated. Get plenty of rest. Use cough drops or over the counter cough medications as needed to control your symptoms.   Please continue your home medications as you were taking prior to this hospitalization and follow-up with your primary care provider in the next 7 days.

## 2024-01-30 NOTE — PROGRESS NOTE ADULT - PROBLEM SELECTOR PLAN 1
- W/ associated cough, otherwise CTAB on exam today   - Unable to quarantine at [facility], must complete 5d quarantine at hospital, today is day 3/5  - Blood cultures negative at 48hr   - C/w droplet isolation, tamiflu for 5d - W/ associated cough, otherwise CTAB on exam today   - Unable to quarantine at [facility], must complete 5d quarantine at hospital, today is day 4/5  - Blood cultures negative at 48hr   - C/w droplet isolation, tamiflu for 5d

## 2024-01-30 NOTE — DISCHARGE NOTE PROVIDER - HOSPITAL COURSE
Patient is a 88-year-old female from [facility] with PMHx of dementia, afib, MV replacement, HFpEF, T12 compression fracture on standing tramadol, admitted for influenza, needed to complete 5 days of quarantine before return to facility. Patient received tamiflu 75mg twice daily for 5 days and completed the 5 day quarantine. Her blood cultures taken on 1/27/24 remained without growth for 48hrs. No changes were made to her home mediations during her stay. Patient was ambulatory prior to her arrival and remained ambulatory during her hospitalization. Patient is medically cleared to return to [facility].     IMAGING:  Xray Chest 1 View- PORTABLE-Urgent (Xray Chest 1 View- PORTABLE-Urgent .) (01.26.24 @ 13:15) >  IMPRESSION: No change. No acute parenchymal disease    Xray Chest 1 View AP/PA (01.27.24 @ 14:19)     HEART:  Enlarged.. Mitral valve ring.  LUNGS: Bibasilar atelectasis. Hyperlucent lungs compatible with   underlying COPD..  BONES: Sternotomy wires. Scoliosis. Osteopenia. Lower thoracic   kyphoplasty.      VITALS:      PHYSICAL EXAM:           Patient is a 88-year-old female from [facility] with PMHx of dementia, afib, MV replacement, HFpEF, T12 compression fracture on standing tramadol, admitted for influenza, needed to complete 5 days of quarantine before return to facility. Patient received tamiflu 75mg twice daily for 5 days and completed the 5 day quarantine. Her blood cultures taken on 1/27/24 remained without growth for 48hrs. No changes were made to her home mediations during her stay. Patient was ambulatory prior to her arrival and remained ambulatory during her hospitalization. Patient is medically cleared to return to [facility]. PCP, Dr. Dewitt, discharge handoff completed.     IMAGING:  Xray Chest 1 View- PORTABLE-Urgent (Xray Chest 1 View- PORTABLE-Urgent .) (01.26.24 @ 13:15) >  IMPRESSION: No change. No acute parenchymal disease    Xray Chest 1 View AP/PA (01.27.24 @ 14:19)     HEART:  Enlarged.. Mitral valve ring.  LUNGS: Bibasilar atelectasis. Hyperlucent lungs compatible with   underlying COPD..  BONES: Sternotomy wires. Scoliosis. Osteopenia. Lower thoracic   kyphoplasty.      VITALS:  T(C): 36.4 (31 Jan 2024 04:56), Max: 37 (30 Jan 2024 18:52)  T(F): 97.5 (31 Jan 2024 04:56), Max: 98.6 (30 Jan 2024 18:52)  HR: 73 (31 Jan 2024 04:56) (73 - 76)  BP: 124/67 (31 Jan 2024 04:56) (101/58 - 124/67)  BP(mean): --  RR: 18 (31 Jan 2024 04:56) (18 - 18)  SpO2: 96% (31 Jan 2024 04:56) (95% - 96%)    Parameters below as of 31 Jan 2024 04:56  Patient On (Oxygen Delivery Method): room air      PHYSICAL EXAM:  GENERAL: NAD, lying in bed comfortably  HEAD:  Atraumatic, Normocephalic  EYES: EOMI, conjunctiva and sclera clear  ENT: Moist mucous membranes  NECK: Supple, No JVD  CHEST/LUNG: Clear to auscultation bilaterally, good air entry bilaterally, clearing cough; No wheezing, rales, or rhonchi. Unlabored respirations  HEART: Regular rate and rhythm. S1 and S2. No murmurs, rubs, or gallops  ABDOMEN: Soft, Nontender, Nondistended. Bowel sounds present.   EXTREMITIES:  2+ Peripheral Pulses. No clubbing, cyanosis, or edema  NERVOUS SYSTEM:  Alert & Oriented X2, speech clear. No deficits.  SKIN: No rashes, bruises, or other lesions         Patient is a 88-year-old female from [facility] with PMHx of dementia, afib, MV replacement, HFpEF, T12 compression fracture on standing tramadol, admitted for influenza, needed to complete 5 days of quarantine before return to facility. Patient received tamiflu 75mg twice daily for 5 days and completed the 5 day quarantine. Her blood cultures taken on 1/27/24 remained without growth for 48hrs. No changes were made to her home mediations during her stay. Patient was ambulatory prior to her arrival and remained ambulatory during her hospitalization. Patient is medically cleared to return to [facility]. PCP, Dr. Dewitt, discharge handoff completed.     IMAGING:  Xray Chest 1 View- PORTABLE-Urgent (Xray Chest 1 View- PORTABLE-Urgent .) (01.26.24 @ 13:15) >  IMPRESSION: No change. No acute parenchymal disease    Xray Chest 1 View AP/PA (01.27.24 @ 14:19)     HEART:  Enlarged.. Mitral valve ring.  LUNGS: Bibasilar atelectasis. Hyperlucent lungs compatible with   underlying COPD..  BONES: Sternotomy wires. Scoliosis. Osteopenia. Lower thoracic   kyphoplasty.      VITALS:  T(C): 36.4 (31 Jan 2024 04:56), Max: 37 (30 Jan 2024 18:52)  T(F): 97.5 (31 Jan 2024 04:56), Max: 98.6 (30 Jan 2024 18:52)  HR: 73 (31 Jan 2024 04:56) (73 - 76)  BP: 124/67 (31 Jan 2024 04:56) (101/58 - 124/67)  BP(mean): --  RR: 18 (31 Jan 2024 04:56) (18 - 18)  SpO2: 96% (31 Jan 2024 04:56) (95% - 96%)    Parameters below as of 31 Jan 2024 04:56  Patient On (Oxygen Delivery Method): room air      PHYSICAL EXAM:  GENERAL: NAD, lying in bed comfortably, elder, pleasant  HEAD:  Atraumatic, Normocephalic  EYES: EOMI, conjunctiva and sclera clear  ENT: Moist mucous membranes  NECK: Supple, No JVD  CHEST/LUNG: Clear to auscultation bilaterally, good air entry bilaterally, clearing cough; No wheezing, rales, or rhonchi. Unlabored respirations  HEART: Regular rate and rhythm. S1 and S2. No murmurs, rubs, or gallops  ABDOMEN: Soft, Nontender, Nondistended. Bowel sounds present.   EXTREMITIES:  2+ Peripheral Pulses. No clubbing, cyanosis, or edema  NERVOUS SYSTEM:  Alert & Oriented X2, speech clear. No deficits.  SKIN: No rashes, bruises, or other lesions         Patient is a 88-year-old female from [facility] with PMHx of dementia, afib, MV replacement, HFpEF, T12 compression fracture on standing tramadol, admitted for influenza, needed to complete 5 days of quarantine before return to facility. Patient received tamiflu 75mg twice daily for 5 days and completed the 5 day quarantine. Her blood cultures taken on 1/27/24 remained without growth for 48hrs. No changes were made to her home mediations during her stay. Patient was ambulatory prior to her arrival and remained ambulatory during her hospitalization. Patient is medically cleared to return to [facility]. PCP, Dr. Dewitt, discharge handoff completed. COVID swab negative.    IMAGING:  Xray Chest 1 View- PORTABLE-Urgent (Xray Chest 1 View- PORTABLE-Urgent .) (01.26.24 @ 13:15) >  IMPRESSION: No change. No acute parenchymal disease    Xray Chest 1 View AP/PA (01.27.24 @ 14:19)     HEART:  Enlarged.. Mitral valve ring.  LUNGS: Bibasilar atelectasis. Hyperlucent lungs compatible with   underlying COPD..  BONES: Sternotomy wires. Scoliosis. Osteopenia. Lower thoracic   kyphoplasty.      VITALS:  T(C): 36.4 (31 Jan 2024 04:56), Max: 37 (30 Jan 2024 18:52)  T(F): 97.5 (31 Jan 2024 04:56), Max: 98.6 (30 Jan 2024 18:52)  HR: 73 (31 Jan 2024 04:56) (73 - 76)  BP: 124/67 (31 Jan 2024 04:56) (101/58 - 124/67)  BP(mean): --  RR: 18 (31 Jan 2024 04:56) (18 - 18)  SpO2: 96% (31 Jan 2024 04:56) (95% - 96%)    Parameters below as of 31 Jan 2024 04:56  Patient On (Oxygen Delivery Method): room air      PHYSICAL EXAM:  GENERAL: NAD, lying in bed comfortably, elder, pleasant  HEAD:  Atraumatic, Normocephalic  EYES: EOMI, conjunctiva and sclera clear  ENT: Moist mucous membranes  NECK: Supple, No JVD  CHEST/LUNG: Clear to auscultation bilaterally, good air entry bilaterally, clearing cough; No wheezing, rales, or rhonchi. Unlabored respirations  HEART: Regular rate and rhythm. S1 and S2. No murmurs, rubs, or gallops  ABDOMEN: Soft, Nontender, Nondistended. Bowel sounds present.   EXTREMITIES:  2+ Peripheral Pulses. No clubbing, cyanosis, or edema  NERVOUS SYSTEM:  Alert & Oriented X2, speech clear. No deficits.  SKIN: No rashes, bruises, or other lesions

## 2024-01-30 NOTE — PROGRESS NOTE ADULT - SUBJECTIVE AND OBJECTIVE BOX
Patient is a 88-year-old female from [facility] with PMHx of dementia, afib, MV replacement, HFpEF, admitted for influenza, needs to complete 5d quarantine, today is day 4.    Overnight Events: None  Interval HPI: Today is hospital day 4. Patient seen and examined at bedside. Patient reporting .... Otherwise tolerating diet and voiding appropriately.     REVIEW OF SYSTEMS:  CONSTITUTIONAL: (-) weakness, (-) fevers, (-) chills  EYES/ENT: (-) visual changes,  (-) vertigo,  (-) throat pain   NECK:  (-) pain, (-) stiffness  RESPIRATORY:  (-) shortness of breath, (+) cough,  (-) wheezing,  (-) hemoptysis   CARDIOVASCULAR:  (-) chest pain, (-) palpitations  GASTROINTESTINAL:  (-) abdominal or epigastric pain, (-) nausea, (-) vomiting, (-) diarrhea, (-) constipation, (-) melena,  (-) hematemesis,  (-) hematochezia  GENITOURINARY: (-) dysuria, (-) frequency, (-) hematuria  NEUROLOGICAL: (-) numbness, (-) weakness  SKIN: (-) itching, (-) rashes, (-) lesions    Vital Signs Last 24 Hrs  T(C): 36.8 (30 Jan 2024 05:38), Max: 36.9 (29 Jan 2024 21:13)  T(F): 98.2 (30 Jan 2024 05:38), Max: 98.4 (29 Jan 2024 21:13)  HR: 98 (30 Jan 2024 05:38) (73 - 98)  BP: 124/76 (30 Jan 2024 05:38) (117/65 - 124/76)  BP(mean): --  RR: 18 (30 Jan 2024 05:38) (18 - 18)  SpO2: 95% (30 Jan 2024 05:38) (95% - 97%)    Parameters below as of 30 Jan 2024 05:38  Patient On (Oxygen Delivery Method): room air    PHYSICAL EXAM:  GENERAL: NAD, lying in bed comfortably  HEAD:  Atraumatic, Normocephalic  EYES: EOMI, conjunctiva and sclera clear  ENT: Moist mucous membranes  NECK: Supple, No JVD  CHEST/LUNG: Clear to auscultation bilaterally, good air entry bilaterally, clearing cough; No wheezing, rales, or rhonchi. Unlabored respirations  HEART: Regular rate and rhythm. S1 and S2. No murmurs, rubs, or gallops  ABDOMEN: Soft, Nontender, Nondistended. Bowel sounds present.   EXTREMITIES:  2+ Peripheral Pulses. No clubbing, cyanosis, or edema  NERVOUS SYSTEM:  Alert & Oriented X3, speech clear. No deficits.  SKIN: No rashes, bruises, or other lesions    LABS:   All Labs Personally Reviewed                         9.7    5.42  )-----------( 156      ( 30 Jan 2024 05:15 )             29.4     30 Jan 2024 05:15    142    |  108    |  29     ----------------------------<  91     4.0     |  29     |  0.54     Ca    8.7        30 Jan 2024 05:15    TPro  6.3    /  Alb  2.5    /  TBili  0.3    /  DBili  x      /  AST  33     /  ALT  16     /  AlkPhos  64     29 Jan 2024 09:17      CAPILLARY BLOOD GLUCOSE    LIVER FUNCTIONS - ( 29 Jan 2024 09:17 )  Alb: 2.5 g/dL / Pro: 6.3 g/dL / ALK PHOS: 64 U/L / ALT: 16 U/L / AST: 33 U/L / GGT: x           Urinalysis Basic - ( 30 Jan 2024 05:15 )    Color: x / Appearance: x / SG: x / pH: x  Gluc: 91 mg/dL / Ketone: x  / Bili: x / Urobili: x   Blood: x / Protein: x / Nitrite: x   Leuk Esterase: x / RBC: x / WBC x   Sq Epi: x / Non Sq Epi: x / Bacteria: x      RADIOLOGY/EKG:  All Imaging and EKGs Personally Reviewed     No new imaging or EKGs to review       MEDICATIONS:  MEDICATIONS  (STANDING):  apixaban 2.5 milliGRAM(s) Oral two times a day  cyanocobalamin 1000 MICROGram(s) Oral daily  furosemide    Tablet 20 milliGRAM(s) Oral daily  melatonin 3 milliGRAM(s) Oral at bedtime  metoprolol succinate ER 25 milliGRAM(s) Oral daily  multivitamin 1 Tablet(s) Oral daily  oseltamivir 30 milliGRAM(s) Oral two times a day  polyethylene glycol 3350 17 Gram(s) Oral daily  senna 2 Tablet(s) Oral at bedtime  traMADol 50 milliGRAM(s) Oral three times a day    MEDICATIONS  (PRN):  acetaminophen     Tablet .. 650 milliGRAM(s) Oral every 8 hours PRN Mild Pain (1 - 3)  benzonatate 100 milliGRAM(s) Oral three times a day PRN Cough           Patient is a 88-year-old female from [facility] with PMHx of dementia, afib, MV replacement, HFpEF, admitted for influenza, needs to complete 5d quarantine, today is day 4.    Overnight Events: None  Interval HPI: Today is hospital day 4. Patient seen and examined at bedside. Patient reporting cough is better. Otherwise tolerating diet and voiding appropriately.     REVIEW OF SYSTEMS:  CONSTITUTIONAL: (-) weakness, (-) fevers, (-) chills  EYES/ENT: (-) visual changes,  (-) vertigo,  (-) throat pain   NECK:  (-) pain, (-) stiffness  RESPIRATORY:  (-) shortness of breath, (+) cough,  (-) wheezing,  (-) hemoptysis   CARDIOVASCULAR:  (-) chest pain, (-) palpitations  GASTROINTESTINAL:  (-) abdominal or epigastric pain, (-) nausea, (-) vomiting, (-) diarrhea, (-) constipation, (-) melena,  (-) hematemesis,  (-) hematochezia  GENITOURINARY: (-) dysuria, (-) frequency, (-) hematuria  NEUROLOGICAL: (-) numbness, (-) weakness  SKIN: (-) itching, (-) rashes, (-) lesions    Vital Signs Last 24 Hrs  T(C): 36.8 (30 Jan 2024 05:38), Max: 36.9 (29 Jan 2024 21:13)  T(F): 98.2 (30 Jan 2024 05:38), Max: 98.4 (29 Jan 2024 21:13)  HR: 98 (30 Jan 2024 05:38) (73 - 98)  BP: 124/76 (30 Jan 2024 05:38) (117/65 - 124/76)  BP(mean): --  RR: 18 (30 Jan 2024 05:38) (18 - 18)  SpO2: 95% (30 Jan 2024 05:38) (95% - 97%)    Parameters below as of 30 Jan 2024 05:38  Patient On (Oxygen Delivery Method): room air    PHYSICAL EXAM:  GENERAL: NAD, lying in bed comfortably  HEAD:  Atraumatic, Normocephalic  EYES: EOMI, conjunctiva and sclera clear  ENT: Moist mucous membranes  NECK: Supple, No JVD  CHEST/LUNG: Clear to auscultation bilaterally, good air entry bilaterally, clearing cough; No wheezing, rales, or rhonchi. Unlabored respirations  HEART: Regular rate and rhythm. S1 and S2. No murmurs, rubs, or gallops  ABDOMEN: Soft, Nontender, Nondistended. Bowel sounds present.   EXTREMITIES:  2+ Peripheral Pulses. No clubbing, cyanosis, or edema  NERVOUS SYSTEM:  Alert & Oriented X2, speech clear. No deficits.  SKIN: No rashes, bruises, or other lesions    LABS:   All Labs Personally Reviewed                         9.7    5.42  )-----------( 156      ( 30 Jan 2024 05:15 )             29.4     30 Jan 2024 05:15    142    |  108    |  29     ----------------------------<  91     4.0     |  29     |  0.54     Ca    8.7        30 Jan 2024 05:15    TPro  6.3    /  Alb  2.5    /  TBili  0.3    /  DBili  x      /  AST  33     /  ALT  16     /  AlkPhos  64     29 Jan 2024 09:17      CAPILLARY BLOOD GLUCOSE    LIVER FUNCTIONS - ( 29 Jan 2024 09:17 )  Alb: 2.5 g/dL / Pro: 6.3 g/dL / ALK PHOS: 64 U/L / ALT: 16 U/L / AST: 33 U/L / GGT: x           Urinalysis Basic - ( 30 Jan 2024 05:15 )    Color: x / Appearance: x / SG: x / pH: x  Gluc: 91 mg/dL / Ketone: x  / Bili: x / Urobili: x   Blood: x / Protein: x / Nitrite: x   Leuk Esterase: x / RBC: x / WBC x   Sq Epi: x / Non Sq Epi: x / Bacteria: x      RADIOLOGY/EKG:  All Imaging and EKGs Personally Reviewed     No new imaging or EKGs to review       MEDICATIONS:  MEDICATIONS  (STANDING):  apixaban 2.5 milliGRAM(s) Oral two times a day  cyanocobalamin 1000 MICROGram(s) Oral daily  furosemide    Tablet 20 milliGRAM(s) Oral daily  melatonin 3 milliGRAM(s) Oral at bedtime  metoprolol succinate ER 25 milliGRAM(s) Oral daily  multivitamin 1 Tablet(s) Oral daily  oseltamivir 30 milliGRAM(s) Oral two times a day  polyethylene glycol 3350 17 Gram(s) Oral daily  senna 2 Tablet(s) Oral at bedtime  traMADol 50 milliGRAM(s) Oral three times a day    MEDICATIONS  (PRN):  acetaminophen     Tablet .. 650 milliGRAM(s) Oral every 8 hours PRN Mild Pain (1 - 3)  benzonatate 100 milliGRAM(s) Oral three times a day PRN Cough

## 2024-01-30 NOTE — PROGRESS NOTE ADULT - ATTENDING COMMENTS
88 year old F PMH dementia, chronic a fib (on Eliquis), diastolic CHF admitted for Influenzae A, unable to return to [facility] isolation protocol. Plan: supportive care, apprec sw/cm dc planning coordiantion, pt is medically stable for discharge
88 year old F PMH dementia, chronic a fib (on Eliquis), diastolic CHF admitted for Influenzae A, unable to return to [facility] due to [facility] protocol. Plan: supportive care, apprec sw/cm dc planning coordiantion, pt is medically stable
88 year old F PMH dementia, chronic a fib (on Eliquis), diastolic CHF admitted for Influenzae A, unable to return to [facility] isolation protocol. Plan: supportive care, isolate for 5 days, day 3/5, apprec sw/[place] dc planning coordiantion, pt is medically stable for discharge on 1/31 back to [facility]
88 year old F PMH dementia, chronic a fib (on Eliquis), diastolic CHF admitted for Influenzae A, unable to return to [facility] isolation protocol. Plan: supportive care, isolate for 5 days, day 3/5, apprec sw/[place] dc planning coordiantion, pt is medically stable for discharge on 1/31 back to [facility]

## 2024-01-31 ENCOUNTER — TRANSCRIPTION ENCOUNTER (OUTPATIENT)
Age: 89
End: 2024-01-31

## 2024-01-31 VITALS
RESPIRATION RATE: 18 BRPM | HEART RATE: 64 BPM | SYSTOLIC BLOOD PRESSURE: 144 MMHG | OXYGEN SATURATION: 96 % | DIASTOLIC BLOOD PRESSURE: 73 MMHG | TEMPERATURE: 98 F

## 2024-01-31 LAB
HCT VFR BLD CALC: 31.2 % — LOW (ref 34.5–45)
HGB BLD-MCNC: 10.1 G/DL — LOW (ref 11.5–15.5)
MCHC RBC-ENTMCNC: 29 PG — SIGNIFICANT CHANGE UP (ref 27–34)
MCHC RBC-ENTMCNC: 32.4 GM/DL — SIGNIFICANT CHANGE UP (ref 32–36)
MCV RBC AUTO: 89.7 FL — SIGNIFICANT CHANGE UP (ref 80–100)
NRBC # BLD: 0 /100 WBCS — SIGNIFICANT CHANGE UP (ref 0–0)
PLATELET # BLD AUTO: 169 K/UL — SIGNIFICANT CHANGE UP (ref 150–400)
RBC # BLD: 3.48 M/UL — LOW (ref 3.8–5.2)
RBC # FLD: 16.8 % — HIGH (ref 10.3–14.5)
SARS-COV-2 RNA SPEC QL NAA+PROBE: SIGNIFICANT CHANGE UP
WBC # BLD: 3.73 K/UL — LOW (ref 3.8–10.5)
WBC # FLD AUTO: 3.73 K/UL — LOW (ref 3.8–10.5)

## 2024-01-31 PROCEDURE — 85027 COMPLETE CBC AUTOMATED: CPT

## 2024-01-31 PROCEDURE — 87040 BLOOD CULTURE FOR BACTERIA: CPT

## 2024-01-31 PROCEDURE — 99239 HOSP IP/OBS DSCHRG MGMT >30: CPT | Mod: GC

## 2024-01-31 PROCEDURE — 87635 SARS-COV-2 COVID-19 AMP PRB: CPT

## 2024-01-31 PROCEDURE — 36415 COLL VENOUS BLD VENIPUNCTURE: CPT

## 2024-01-31 PROCEDURE — 80048 BASIC METABOLIC PNL TOTAL CA: CPT

## 2024-01-31 PROCEDURE — 80053 COMPREHEN METABOLIC PANEL: CPT

## 2024-01-31 PROCEDURE — 85025 COMPLETE CBC W/AUTO DIFF WBC: CPT

## 2024-01-31 PROCEDURE — 71045 X-RAY EXAM CHEST 1 VIEW: CPT

## 2024-01-31 PROCEDURE — 87637 SARSCOV2&INF A&B&RSV AMP PRB: CPT

## 2024-01-31 PROCEDURE — 99285 EMERGENCY DEPT VISIT HI MDM: CPT | Mod: 25

## 2024-01-31 RX ADMIN — Medication 1 TABLET(S): at 11:14

## 2024-01-31 RX ADMIN — Medication 20 MILLIGRAM(S): at 06:22

## 2024-01-31 RX ADMIN — PREGABALIN 1000 MICROGRAM(S): 225 CAPSULE ORAL at 11:14

## 2024-01-31 RX ADMIN — Medication 30 MILLIGRAM(S): at 06:23

## 2024-01-31 RX ADMIN — APIXABAN 2.5 MILLIGRAM(S): 2.5 TABLET, FILM COATED ORAL at 06:21

## 2024-01-31 RX ADMIN — TRAMADOL HYDROCHLORIDE 50 MILLIGRAM(S): 50 TABLET ORAL at 07:07

## 2024-01-31 RX ADMIN — Medication 25 MILLIGRAM(S): at 06:22

## 2024-01-31 RX ADMIN — POLYETHYLENE GLYCOL 3350 17 GRAM(S): 17 POWDER, FOR SOLUTION ORAL at 11:14

## 2024-01-31 RX ADMIN — TRAMADOL HYDROCHLORIDE 50 MILLIGRAM(S): 50 TABLET ORAL at 06:21

## 2024-01-31 NOTE — DISCHARGE NOTE NURSING/CASE MANAGEMENT/SOCIAL WORK - PATIENT PORTAL LINK FT
You can access the FollowMyHealth Patient Portal offered by [facility] by registering at the following website: [email or web address]. By joining [employer]’s FollowMyHealth portal, you will also be able to view your health information using other applications (apps) compatible with our system.

## 2024-01-31 NOTE — PROGRESS NOTE ADULT - NUTRITIONAL ASSESSMENT
This patient has been assessed with a concern for Malnutrition and has been determined to have a diagnosis/diagnoses of Moderate protein-calorie malnutrition and Underweight (BMI < 19).    This patient is being managed with:   Diet Regular-  Supplement Feeding Modality:  Oral  Ensure Plus High Protein Cans or Servings Per Day:  1       Frequency:  Two Times a day  Entered: Jan 29 2024 11:00AM  
This patient has been assessed with a concern for Malnutrition and has been determined to have a diagnosis/diagnoses of Moderate protein-calorie malnutrition and Underweight (BMI < 19).    This patient is being managed with:   Diet Regular-  Supplement Feeding Modality:  Oral  Ensure Plus High Protein Cans or Servings Per Day:  1       Frequency:  Two Times a day  Entered: Jan 29 2024 11:00AM

## 2024-01-31 NOTE — PROGRESS NOTE ADULT - REASON FOR ADMISSION
Cough, Flu A positive,

## 2024-01-31 NOTE — PROGRESS NOTE ADULT - SUBJECTIVE AND OBJECTIVE BOX
Patient is a 88-year-old female from [facility] with PMHx of dementia, afib, MV replacement, HFpEF, admitted for influenza, needs to complete 5d quarantine, today is day 5.    Overnight Events: None  Interval HPI: Today is hospital day 5. Patient seen and examined at bedside.     REVIEW OF SYSTEMS:  CONSTITUTIONAL: (-) weakness, (-) fevers, (-) chills  EYES/ENT: (-) visual changes,  (-) vertigo,  (-) throat pain   NECK:  (-) pain, (-) stiffness  RESPIRATORY:  (-) shortness of breath, (+) cough,  (-) wheezing,  (-) hemoptysis   CARDIOVASCULAR:  (-) chest pain, (-) palpitations  GASTROINTESTINAL:  (-) abdominal or epigastric pain, (-) nausea, (-) vomiting, (-) diarrhea, (-) constipation, (-) melena,  (-) hematemesis,  (-) hematochezia  GENITOURINARY: (-) dysuria, (-) frequency, (-) hematuria  NEUROLOGICAL: (-) numbness, (-) weakness  SKIN: (-) itching, (-) rashes, (-) lesions    Vital Signs Last 24 Hrs  T(C): 36.4 (31 Jan 2024 04:56), Max: 37 (30 Jan 2024 18:52)  T(F): 97.5 (31 Jan 2024 04:56), Max: 98.6 (30 Jan 2024 18:52)  HR: 73 (31 Jan 2024 04:56) (73 - 76)  BP: 124/67 (31 Jan 2024 04:56) (101/58 - 125/64)  BP(mean): --  RR: 18 (31 Jan 2024 04:56) (18 - 18)  SpO2: 96% (31 Jan 2024 04:56) (95% - 97%)    Parameters below as of 31 Jan 2024 04:56  Patient On (Oxygen Delivery Method): room air      PHYSICAL EXAM:  GENERAL: NAD, lying in bed comfortably  HEAD:  Atraumatic, Normocephalic  EYES: EOMI, conjunctiva and sclera clear  ENT: Moist mucous membranes  NECK: Supple, No JVD  CHEST/LUNG: Clear to auscultation bilaterally, good air entry bilaterally, clearing cough; No wheezing, rales, or rhonchi. Unlabored respirations  HEART: Regular rate and rhythm. S1 and S2. No murmurs, rubs, or gallops  ABDOMEN: Soft, Nontender, Nondistended. Bowel sounds present.   EXTREMITIES:  2+ Peripheral Pulses. No clubbing, cyanosis, or edema  NERVOUS SYSTEM:  Alert & Oriented X2, speech clear. No deficits.  SKIN: No rashes, bruises, or other lesions    LABS:   All Labs Personally Reviewed                         10.1   3.73  )-----------( 169      ( 31 Jan 2024 06:00 )             31.2     30 Jan 2024 05:15    142    |  108    |  29     ----------------------------<  91     4.0     |  29     |  0.54     Ca    8.7        30 Jan 2024 05:15    TPro  6.3    /  Alb  2.5    /  TBili  0.3    /  DBili  x      /  AST  33     /  ALT  16     /  AlkPhos  64     29 Jan 2024 09:17      CAPILLARY BLOOD GLUCOSE    LIVER FUNCTIONS - ( 29 Jan 2024 09:17 )  Alb: 2.5 g/dL / Pro: 6.3 g/dL / ALK PHOS: 64 U/L / ALT: 16 U/L / AST: 33 U/L / GGT: x           Urinalysis Basic - ( 30 Jan 2024 05:15 )    Color: x / Appearance: x / SG: x / pH: x  Gluc: 91 mg/dL / Ketone: x  / Bili: x / Urobili: x   Blood: x / Protein: x / Nitrite: x   Leuk Esterase: x / RBC: x / WBC x   Sq Epi: x / Non Sq Epi: x / Bacteria: x      RADIOLOGY/EKG:  All Imaging and EKGs Personally Reviewed     No new imaging or EKGs to review       MEDICATIONS:  MEDICATIONS  (STANDING):  apixaban 2.5 milliGRAM(s) Oral two times a day  cyanocobalamin 1000 MICROGram(s) Oral daily  furosemide    Tablet 20 milliGRAM(s) Oral daily  melatonin 3 milliGRAM(s) Oral at bedtime  metoprolol succinate ER 25 milliGRAM(s) Oral daily  multivitamin 1 Tablet(s) Oral daily  polyethylene glycol 3350 17 Gram(s) Oral daily  senna 2 Tablet(s) Oral at bedtime  traMADol 50 milliGRAM(s) Oral three times a day    MEDICATIONS  (PRN):  acetaminophen     Tablet .. 650 milliGRAM(s) Oral every 8 hours PRN Mild Pain (1 - 3)  benzonatate 100 milliGRAM(s) Oral three times a day PRN Cough             Patient is a 88-year-old female from [facility] with PMHx of dementia, afib, MV replacement, HFpEF, admitted for influenza, needs to complete 5d quarantine, today is day 5.    Overnight Events: None  Interval HPI: Today is hospital day 5. Patient seen and examined at bedside. Patient feeling better, ready to go back to [facility]. No other concerns or complaints at this time.     REVIEW OF SYSTEMS:  CONSTITUTIONAL: (-) weakness, (-) fevers, (-) chills  EYES/ENT: (-) visual changes,  (-) vertigo,  (-) throat pain   NECK:  (-) pain, (-) stiffness  RESPIRATORY:  (-) shortness of breath, (+) cough,  (-) wheezing,  (-) hemoptysis   CARDIOVASCULAR:  (-) chest pain, (-) palpitations  GASTROINTESTINAL:  (-) abdominal or epigastric pain, (-) nausea, (-) vomiting, (-) diarrhea, (-) constipation, (-) melena,  (-) hematemesis,  (-) hematochezia  GENITOURINARY: (-) dysuria, (-) frequency, (-) hematuria  NEUROLOGICAL: (-) numbness, (-) weakness  SKIN: (-) itching, (-) rashes, (-) lesions    Vital Signs Last 24 Hrs  T(C): 36.4 (31 Jan 2024 04:56), Max: 37 (30 Jan 2024 18:52)  T(F): 97.5 (31 Jan 2024 04:56), Max: 98.6 (30 Jan 2024 18:52)  HR: 73 (31 Jan 2024 04:56) (73 - 76)  BP: 124/67 (31 Jan 2024 04:56) (101/58 - 124/67)  BP(mean): --  RR: 18 (31 Jan 2024 04:56) (18 - 18)  SpO2: 96% (31 Jan 2024 04:56) (95% - 96%)    Parameters below as of 31 Jan 2024 04:56  Patient On (Oxygen Delivery Method): room air      PHYSICAL EXAM:  GENERAL: NAD, lying in bed comfortably  HEAD:  Atraumatic, Normocephalic  EYES: EOMI, conjunctiva and sclera clear  ENT: Moist mucous membranes  NECK: Supple, No JVD  CHEST/LUNG: Clear to auscultation bilaterally, good air entry bilaterally, clearing cough; No wheezing, rales, or rhonchi. Unlabored respirations  HEART: Regular rate and rhythm. S1 and S2. No murmurs, rubs, or gallops  ABDOMEN: Soft, Nontender, Nondistended. Bowel sounds present.   EXTREMITIES:  2+ Peripheral Pulses. No clubbing, cyanosis, or edema  NERVOUS SYSTEM:  Alert & Oriented X2, speech clear. No deficits.  SKIN: No rashes, bruises, or other lesions    LABS:   All Labs Personally Reviewed                         10.1   3.73  )-----------( 169      ( 31 Jan 2024 06:00 )             31.2     30 Jan 2024 05:15    142    |  108    |  29     ----------------------------<  91     4.0     |  29     |  0.54     Ca    8.7        30 Jan 2024 05:15    TPro  6.3    /  Alb  2.5    /  TBili  0.3    /  DBili  x      /  AST  33     /  ALT  16     /  AlkPhos  64     29 Jan 2024 09:17      CAPILLARY BLOOD GLUCOSE    LIVER FUNCTIONS - ( 29 Jan 2024 09:17 )  Alb: 2.5 g/dL / Pro: 6.3 g/dL / ALK PHOS: 64 U/L / ALT: 16 U/L / AST: 33 U/L / GGT: x           Urinalysis Basic - ( 30 Jan 2024 05:15 )    Color: x / Appearance: x / SG: x / pH: x  Gluc: 91 mg/dL / Ketone: x  / Bili: x / Urobili: x   Blood: x / Protein: x / Nitrite: x   Leuk Esterase: x / RBC: x / WBC x   Sq Epi: x / Non Sq Epi: x / Bacteria: x      RADIOLOGY/EKG:  All Imaging and EKGs Personally Reviewed     No new imaging or EKGs to review       MEDICATIONS:  MEDICATIONS  (STANDING):  apixaban 2.5 milliGRAM(s) Oral two times a day  cyanocobalamin 1000 MICROGram(s) Oral daily  furosemide    Tablet 20 milliGRAM(s) Oral daily  melatonin 3 milliGRAM(s) Oral at bedtime  metoprolol succinate ER 25 milliGRAM(s) Oral daily  multivitamin 1 Tablet(s) Oral daily  polyethylene glycol 3350 17 Gram(s) Oral daily  senna 2 Tablet(s) Oral at bedtime  traMADol 50 milliGRAM(s) Oral three times a day    MEDICATIONS  (PRN):  acetaminophen     Tablet .. 650 milliGRAM(s) Oral every 8 hours PRN Mild Pain (1 - 3)  benzonatate 100 milliGRAM(s) Oral three times a day PRN Cough

## 2024-01-31 NOTE — PROGRESS NOTE ADULT - PROBLEM SELECTOR PLAN 4
- Rate controlled w/ metoprolol succ 25mg QD   - C/w metoprolol and eliquis 2.5mg BID

## 2024-01-31 NOTE — DISCHARGE NOTE NURSING/CASE MANAGEMENT/SOCIAL WORK - NSDCPEFALRISK_GEN_ALL_CORE
For information on Fall & Injury Prevention, visit: [email or web address] OR  [email or web address] OR  https://www.cdc.gov/steadi/patient.html
all other ROS negative except as per HPI

## 2024-01-31 NOTE — PROGRESS NOTE ADULT - PROBLEM SELECTOR PLAN 1
- W/ associated cough, otherwise CTAB on exam today   - Unable to quarantine at [facility], must complete 5d quarantine at hospital, today is day 5/5  - Blood cultures negative at 48hr   - Medically stable for discharge today

## 2024-01-31 NOTE — PROGRESS NOTE ADULT - ASSESSMENT
Patient is a 88-year-old female from [facility] with PMHx of dementia, afib, MV replacement, HFpEF, admitted for influenza, needs to complete 5d quarantine, today is day 5. Dispo: D/C today.     Discussed with attending, Dr. Woodall.

## 2024-01-31 NOTE — PROGRESS NOTE ADULT - PROBLEM SELECTOR PLAN 2
- Clinically euvolemic  - C/w lasix 20mg QD and metoprolol succ 25mg QD

## 2024-01-31 NOTE — PROGRESS NOTE ADULT - PROBLEM SELECTOR PLAN 3
- Chronic  - C/w lasix 20mg QD and metoprolol succ 25mg QD  - Monitor vitals

## 2024-02-01 LAB
CULTURE RESULTS: SIGNIFICANT CHANGE UP
CULTURE RESULTS: SIGNIFICANT CHANGE UP
SPECIMEN SOURCE: SIGNIFICANT CHANGE UP
SPECIMEN SOURCE: SIGNIFICANT CHANGE UP

## 2024-07-22 ENCOUNTER — APPOINTMENT (OUTPATIENT)
Dept: CARDIOLOGY | Facility: CLINIC | Age: 89
End: 2024-07-22

## 2024-08-07 ENCOUNTER — NON-APPOINTMENT (OUTPATIENT)
Age: 89
End: 2024-08-07

## 2024-09-03 NOTE — ED ADULT NURSE NOTE - ALCOHOL PRE SCREEN (AUDIT - C)
Problem: PHYSICAL THERAPY ADULT  Goal: Performs mobility at highest level of function for planned discharge setting.  See evaluation for individualized goals.  Description: Treatment/Interventions: Functional transfer training, LE strengthening/ROM, Elevations, Therapeutic exercise, Endurance training, Cognitive reorientation, Patient/family training, Equipment eval/education, Bed mobility, Gait training, Spoke to nursing, OT  Equipment Recommended: Other (Comment) (TBD)       See flowsheet documentation for full assessment, interventions and recommendations.  Outcome: Progressing  Note: Prognosis: Good  Problem List: Decreased strength, Decreased endurance, Impaired balance, Decreased mobility, Decreased coordination, Decreased cognition, Impaired judgement, Decreased safety awareness  Assessment: The patient was found egressing from the bed with the alarm sounding. He demonstrates notable improvement in his mobility as he is attaining household distances with close supervision. He had no loss of balance, but he struggles in busy environments. Complex environments also require increased time for him to navigate with three instances of bumping into objects on the left side. He was in the chair post session with the alarm active and all needs within reach.  Barriers to Discharge: Inaccessible home environment, Decreased caregiver support     Rehab Resource Intensity Level, PT: II (Moderate Resource Intensity)    See flowsheet documentation for full assessment.         Statement Selected

## 2024-09-04 ENCOUNTER — APPOINTMENT (OUTPATIENT)
Dept: INTERNAL MEDICINE | Facility: CLINIC | Age: 89
End: 2024-09-04

## 2024-09-10 NOTE — ED PROVIDER NOTE - MDM ORDERS SUBMITTED SELECTION
An arrival time for procedure was not provided during PAT visit. If patient had any questions or concerns about their arrival time, they were instructed to contact their surgeon/physician.  Additionally, if the patient referred to an arrival time that was acquired from their my chart account, patient was encouraged to verify that time with their surgeon/physician. Arrival times are NOT provided in Pre Admission Testing Department.     Per Anesthesia Request, patient instructed not to take their ACE/ARB medications on the AM of surgery.     Patient to apply Chlorhexadine wipes  to surgical area (as instructed) the night before procedure and the AM of procedure. Wipes provided.     Patient viewed general PAT education video as instructed in their preoperative information received from their surgeon.  Patient stated the general PAT education video was viewed in its entirety and survey completed.  Copies of PAT general education handouts (Incentive Spirometry, Meds to Beds Program, Patient Belongings, Pre-op skin preparation instructions, Blood Glucose testing, Visitor policy, Surgery FAQ, Code H) distributed to patient if not printed. Education related to the PAT pass and skin preparation for surgery (if applicable) completed in PAT as a reinforcement to PAT education video. Patient instructed to return PAT pass provided today as well as completed skin preparation sheet (if applicable) on the day of procedure.     Additionally if patient had not viewed video yet but intended to view it at home or in our waiting area, then referred them to the handout with QR code/link provided during PAT visit.  Encouraged patient/family to read PAT general education handouts thoroughly and notify PAT staff with any questions or concerns. Patient verbalized understanding of all information and priority content.   .   Labs Labs/EKG/Imaging Studies/Medications

## 2024-10-23 ENCOUNTER — RESULT REVIEW (OUTPATIENT)
Age: 89
End: 2024-10-23

## 2024-10-23 ENCOUNTER — INPATIENT (INPATIENT)
Facility: HOSPITAL | Age: 89
LOS: 2 days | Discharge: HOSPICE | DRG: 280 | End: 2024-10-26
Attending: HOSPITALIST | Admitting: STUDENT IN AN ORGANIZED HEALTH CARE EDUCATION/TRAINING PROGRAM
Payer: MEDICARE

## 2024-10-23 VITALS
TEMPERATURE: 97 F | HEART RATE: 88 BPM | RESPIRATION RATE: 20 BRPM | OXYGEN SATURATION: 95 % | HEIGHT: 64 IN | DIASTOLIC BLOOD PRESSURE: 81 MMHG | SYSTOLIC BLOOD PRESSURE: 146 MMHG | WEIGHT: 149.91 LBS

## 2024-10-23 DIAGNOSIS — I50.9 HEART FAILURE, UNSPECIFIED: ICD-10-CM

## 2024-10-23 DIAGNOSIS — Z90.710 ACQUIRED ABSENCE OF BOTH CERVIX AND UTERUS: Chronic | ICD-10-CM

## 2024-10-23 DIAGNOSIS — Z98.890 OTHER SPECIFIED POSTPROCEDURAL STATES: Chronic | ICD-10-CM

## 2024-10-23 LAB
ALBUMIN SERPL ELPH-MCNC: 3.4 G/DL — SIGNIFICANT CHANGE UP (ref 3.3–5)
ALP SERPL-CCNC: 86 U/L — SIGNIFICANT CHANGE UP (ref 40–120)
ALT FLD-CCNC: 115 U/L — HIGH (ref 10–45)
ANION GAP SERPL CALC-SCNC: 11 MMOL/L — SIGNIFICANT CHANGE UP (ref 5–17)
ANION GAP SERPL CALC-SCNC: 8 MMOL/L — SIGNIFICANT CHANGE UP (ref 5–17)
APPEARANCE UR: CLEAR — SIGNIFICANT CHANGE UP
AST SERPL-CCNC: 103 U/L — HIGH (ref 10–40)
BACTERIA # UR AUTO: ABNORMAL /HPF
BASE EXCESS BLDV CALC-SCNC: -1 MMOL/L — SIGNIFICANT CHANGE UP (ref -2–3)
BASOPHILS # BLD AUTO: 0.04 K/UL — SIGNIFICANT CHANGE UP (ref 0–0.2)
BASOPHILS NFR BLD AUTO: 0.5 % — SIGNIFICANT CHANGE UP (ref 0–2)
BILIRUB SERPL-MCNC: 1.7 MG/DL — HIGH (ref 0.2–1.2)
BILIRUB UR-MCNC: NEGATIVE — SIGNIFICANT CHANGE UP
BUN SERPL-MCNC: 29 MG/DL — HIGH (ref 7–23)
BUN SERPL-MCNC: 32 MG/DL — HIGH (ref 7–23)
CALCIUM SERPL-MCNC: 8.7 MG/DL — SIGNIFICANT CHANGE UP (ref 8.4–10.5)
CALCIUM SERPL-MCNC: 9.4 MG/DL — SIGNIFICANT CHANGE UP (ref 8.4–10.5)
CHLORIDE SERPL-SCNC: 102 MMOL/L — SIGNIFICANT CHANGE UP (ref 96–108)
CHLORIDE SERPL-SCNC: 104 MMOL/L — SIGNIFICANT CHANGE UP (ref 96–108)
CO2 BLDV-SCNC: 26 MMOL/L — SIGNIFICANT CHANGE UP (ref 22–26)
CO2 SERPL-SCNC: 26 MMOL/L — SIGNIFICANT CHANGE UP (ref 22–31)
CO2 SERPL-SCNC: 31 MMOL/L — SIGNIFICANT CHANGE UP (ref 22–31)
COLOR SPEC: YELLOW — SIGNIFICANT CHANGE UP
CREAT SERPL-MCNC: 0.9 MG/DL — SIGNIFICANT CHANGE UP (ref 0.5–1.3)
CREAT SERPL-MCNC: 0.91 MG/DL — SIGNIFICANT CHANGE UP (ref 0.5–1.3)
DIFF PNL FLD: ABNORMAL
EGFR: 60 ML/MIN/1.73M2 — SIGNIFICANT CHANGE UP
EGFR: 60 ML/MIN/1.73M2 — SIGNIFICANT CHANGE UP
EGFR: 61 ML/MIN/1.73M2 — SIGNIFICANT CHANGE UP
EGFR: 61 ML/MIN/1.73M2 — SIGNIFICANT CHANGE UP
EOSINOPHIL # BLD AUTO: 0.02 K/UL — SIGNIFICANT CHANGE UP (ref 0–0.5)
EOSINOPHIL NFR BLD AUTO: 0.2 % — SIGNIFICANT CHANGE UP (ref 0–6)
EPI CELLS # UR: PRESENT
FLUAV AG NPH QL: SIGNIFICANT CHANGE UP
FLUBV AG NPH QL: SIGNIFICANT CHANGE UP
FT4I SERPL CALC-MCNC: 3.2 FTI% — SIGNIFICANT CHANGE UP (ref 1.4–4.8)
FT4I SERPL CALC-MCNC: 9 INDEX — SIGNIFICANT CHANGE UP (ref 4.4–11.4)
GAS PNL BLDV: SIGNIFICANT CHANGE UP
GLUCOSE SERPL-MCNC: 189 MG/DL — HIGH (ref 70–99)
GLUCOSE SERPL-MCNC: 254 MG/DL — HIGH (ref 70–99)
GLUCOSE UR QL: NEGATIVE MG/DL — SIGNIFICANT CHANGE UP
HCO3 BLDV-SCNC: 24 MMOL/L — SIGNIFICANT CHANGE UP (ref 22–29)
HCT VFR BLD CALC: 33.5 % — LOW (ref 34.5–45)
HGB BLD-MCNC: 11.2 G/DL — LOW (ref 11.5–15.5)
IMM GRANULOCYTES NFR BLD AUTO: 0.4 % — SIGNIFICANT CHANGE UP (ref 0–0.9)
KETONES UR-MCNC: NEGATIVE MG/DL — SIGNIFICANT CHANGE UP
LEUKOCYTE ESTERASE UR-ACNC: NEGATIVE — SIGNIFICANT CHANGE UP
LYMPHOCYTES # BLD AUTO: 0.45 K/UL — LOW (ref 1–3.3)
LYMPHOCYTES # BLD AUTO: 5.5 % — LOW (ref 13–44)
MAGNESIUM SERPL-MCNC: 1.7 MG/DL — SIGNIFICANT CHANGE UP (ref 1.6–2.6)
MCHC RBC-ENTMCNC: 29 PG — SIGNIFICANT CHANGE UP (ref 27–34)
MCHC RBC-ENTMCNC: 33.4 GM/DL — SIGNIFICANT CHANGE UP (ref 32–36)
MCV RBC AUTO: 86.8 FL — SIGNIFICANT CHANGE UP (ref 80–100)
MONOCYTES # BLD AUTO: 0.88 K/UL — SIGNIFICANT CHANGE UP (ref 0–0.9)
MONOCYTES NFR BLD AUTO: 10.8 % — SIGNIFICANT CHANGE UP (ref 2–14)
NEUTROPHILS # BLD AUTO: 6.73 K/UL — SIGNIFICANT CHANGE UP (ref 1.8–7.4)
NEUTROPHILS NFR BLD AUTO: 82.6 % — HIGH (ref 43–77)
NITRITE UR-MCNC: NEGATIVE — SIGNIFICANT CHANGE UP
NRBC # BLD: 0 /100 WBCS — SIGNIFICANT CHANGE UP (ref 0–0)
NRBC BLD-RTO: 0 /100 WBCS — SIGNIFICANT CHANGE UP (ref 0–0)
NT-PROBNP SERPL-SCNC: 6736 PG/ML — HIGH (ref 0–300)
PCO2 BLDV: 42 MMHG — SIGNIFICANT CHANGE UP (ref 39–42)
PH BLDV: 7.37 — SIGNIFICANT CHANGE UP (ref 7.32–7.43)
PH UR: 5 — SIGNIFICANT CHANGE UP (ref 5–8)
PHOSPHATE SERPL-MCNC: 3 MG/DL — SIGNIFICANT CHANGE UP (ref 2.5–4.5)
PLATELET # BLD AUTO: 208 K/UL — SIGNIFICANT CHANGE UP (ref 150–400)
PO2 BLDV: 61 MMHG — HIGH (ref 25–45)
POTASSIUM SERPL-MCNC: 3.6 MMOL/L — SIGNIFICANT CHANGE UP (ref 3.5–5.3)
POTASSIUM SERPL-MCNC: 4 MMOL/L — SIGNIFICANT CHANGE UP (ref 3.5–5.3)
POTASSIUM SERPL-SCNC: 3.6 MMOL/L — SIGNIFICANT CHANGE UP (ref 3.5–5.3)
POTASSIUM SERPL-SCNC: 4 MMOL/L — SIGNIFICANT CHANGE UP (ref 3.5–5.3)
PROT SERPL-MCNC: 7.8 G/DL — SIGNIFICANT CHANGE UP (ref 6–8.3)
PROT UR-MCNC: 30 MG/DL
RAPID RVP RESULT: DETECTED
RBC # BLD: 3.86 M/UL — SIGNIFICANT CHANGE UP (ref 3.8–5.2)
RBC # FLD: 16.8 % — HIGH (ref 10.3–14.5)
RBC CASTS # UR COMP ASSIST: 4 /HPF — SIGNIFICANT CHANGE UP (ref 0–4)
RSV RNA NPH QL NAA+NON-PROBE: SIGNIFICANT CHANGE UP
RV+EV RNA SPEC QL NAA+PROBE: DETECTED
SAO2 % BLDV: 92.8 % — HIGH (ref 67–88)
SARS-COV-2 RNA SPEC QL NAA+PROBE: SIGNIFICANT CHANGE UP
SARS-COV-2 RNA SPEC QL NAA+PROBE: SIGNIFICANT CHANGE UP
SODIUM SERPL-SCNC: 141 MMOL/L — SIGNIFICANT CHANGE UP (ref 135–145)
SODIUM SERPL-SCNC: 141 MMOL/L — SIGNIFICANT CHANGE UP (ref 135–145)
SP GR SPEC: 1.01 — SIGNIFICANT CHANGE UP (ref 1–1.03)
T3/T3 UPTAKE INDEX SERPL-RTO: 36 % — SIGNIFICANT CHANGE UP (ref 28–41)
T4 AB SER-ACNC: 8.8 UG/DL — SIGNIFICANT CHANGE UP (ref 4.6–12)
T4/T3 UPTAKE INDEX SERPL: 1 TBI — SIGNIFICANT CHANGE UP (ref 0.8–1.3)
TROPONIN I, HIGH SENSITIVITY RESULT: 311.5 NG/L — HIGH
TROPONIN I, HIGH SENSITIVITY RESULT: 615.2 NG/L — HIGH
TROPONIN I, HIGH SENSITIVITY RESULT: 752.9 NG/L — HIGH
TSH SERPL-MCNC: 0.28 UIU/ML — LOW (ref 0.36–3.74)
UROBILINOGEN FLD QL: 0.2 MG/DL — SIGNIFICANT CHANGE UP (ref 0.2–1)
WBC # BLD: 8.15 K/UL — SIGNIFICANT CHANGE UP (ref 3.8–10.5)
WBC # FLD AUTO: 8.15 K/UL — SIGNIFICANT CHANGE UP (ref 3.8–10.5)
WBC UR QL: 0 /HPF — SIGNIFICANT CHANGE UP (ref 0–5)

## 2024-10-23 PROCEDURE — 93306 TTE W/DOPPLER COMPLETE: CPT | Mod: 26

## 2024-10-23 PROCEDURE — 99291 CRITICAL CARE FIRST HOUR: CPT

## 2024-10-23 PROCEDURE — 71045 X-RAY EXAM CHEST 1 VIEW: CPT | Mod: 26

## 2024-10-23 PROCEDURE — 93010 ELECTROCARDIOGRAM REPORT: CPT

## 2024-10-23 PROCEDURE — 71250 CT THORAX DX C-: CPT | Mod: 26,MC

## 2024-10-23 PROCEDURE — 99222 1ST HOSP IP/OBS MODERATE 55: CPT

## 2024-10-23 RX ORDER — ACETAMINOPHEN 500 MG/5ML
650 LIQUID (ML) ORAL EVERY 6 HOURS
Refills: 0 | Status: DISCONTINUED | OUTPATIENT
Start: 2024-10-23 | End: 2024-10-26

## 2024-10-23 RX ORDER — FUROSEMIDE 10 MG/ML
40 INJECTION INTRAMUSCULAR; INTRAVENOUS ONCE
Refills: 0 | Status: COMPLETED | OUTPATIENT
Start: 2024-10-23 | End: 2024-10-23

## 2024-10-23 RX ORDER — METOPROLOL SUCCINATE 50 MG/1
25 TABLET, EXTENDED RELEASE ORAL DAILY
Refills: 0 | Status: DISCONTINUED | OUTPATIENT
Start: 2024-10-23 | End: 2024-10-25

## 2024-10-23 RX ORDER — DEXMEDETOMIDINE HYDROCHLORIDE IN SODIUM CHLORIDE 4 UG/ML
0.05 INJECTION INTRAVENOUS
Qty: 400 | Refills: 0 | Status: DISCONTINUED | OUTPATIENT
Start: 2024-10-23 | End: 2024-10-25

## 2024-10-23 RX ORDER — DILTIAZEM HYDROCHLORIDE 240 MG/1
5 TABLET, EXTENDED RELEASE ORAL
Qty: 125 | Refills: 0 | Status: DISCONTINUED | OUTPATIENT
Start: 2024-10-23 | End: 2024-10-24

## 2024-10-23 RX ORDER — CEFTRIAXONE 500 MG/1
1000 INJECTION, POWDER, FOR SOLUTION INTRAMUSCULAR; INTRAVENOUS EVERY 24 HOURS
Refills: 0 | Status: DISCONTINUED | OUTPATIENT
Start: 2024-10-23 | End: 2024-10-26

## 2024-10-23 RX ORDER — FUROSEMIDE 10 MG/ML
20 INJECTION INTRAMUSCULAR; INTRAVENOUS ONCE
Refills: 0 | Status: COMPLETED | OUTPATIENT
Start: 2024-10-23 | End: 2024-10-23

## 2024-10-23 RX ORDER — DILTIAZEM HYDROCHLORIDE 240 MG/1
10 TABLET, EXTENDED RELEASE ORAL ONCE
Refills: 0 | Status: COMPLETED | OUTPATIENT
Start: 2024-10-23 | End: 2024-10-23

## 2024-10-23 RX ORDER — AZITHROMYCIN 250 MG
500 CAPSULE ORAL EVERY 24 HOURS
Refills: 0 | Status: DISCONTINUED | OUTPATIENT
Start: 2024-10-23 | End: 2024-10-26

## 2024-10-23 RX ORDER — CYANOCOBALAMIN 1000 UG/ML
1000 INJECTION INTRAMUSCULAR; SUBCUTANEOUS DAILY
Refills: 0 | Status: DISCONTINUED | OUTPATIENT
Start: 2024-10-23 | End: 2024-10-26

## 2024-10-23 RX ORDER — DILTIAZEM HYDROCHLORIDE 240 MG/1
15 TABLET, EXTENDED RELEASE ORAL ONCE
Refills: 0 | Status: COMPLETED | OUTPATIENT
Start: 2024-10-23 | End: 2024-10-23

## 2024-10-23 RX ORDER — TRAMADOL HYDROCHLORIDE 50 MG/1
50 TABLET, FILM COATED ORAL THREE TIMES A DAY
Refills: 0 | Status: DISCONTINUED | OUTPATIENT
Start: 2024-10-23 | End: 2024-10-26

## 2024-10-23 RX ORDER — FUROSEMIDE 10 MG/ML
20 INJECTION INTRAMUSCULAR; INTRAVENOUS DAILY
Refills: 0 | Status: DISCONTINUED | OUTPATIENT
Start: 2024-10-24 | End: 2024-10-24

## 2024-10-23 RX ORDER — DILTIAZEM HYDROCHLORIDE 240 MG/1
60 TABLET, EXTENDED RELEASE ORAL ONCE
Refills: 0 | Status: COMPLETED | OUTPATIENT
Start: 2024-10-23 | End: 2024-10-23

## 2024-10-23 RX ORDER — B1/B2/B3/B5/B6/B12/VIT C/FOLIC 500-0.5 MG
1 TABLET ORAL DAILY
Refills: 0 | Status: DISCONTINUED | OUTPATIENT
Start: 2024-10-23 | End: 2024-10-26

## 2024-10-23 RX ADMIN — Medication 255 MILLIGRAM(S): at 15:36

## 2024-10-23 RX ADMIN — DEXMEDETOMIDINE HYDROCHLORIDE IN SODIUM CHLORIDE 0.85 MICROGRAM(S)/KG/HR: 4 INJECTION INTRAVENOUS at 14:47

## 2024-10-23 RX ADMIN — CEFTRIAXONE 100 MILLIGRAM(S): 500 INJECTION, POWDER, FOR SOLUTION INTRAMUSCULAR; INTRAVENOUS at 15:34

## 2024-10-23 RX ADMIN — DILTIAZEM HYDROCHLORIDE 5 MG/HR: 240 TABLET, EXTENDED RELEASE ORAL at 18:37

## 2024-10-23 RX ADMIN — DILTIAZEM HYDROCHLORIDE 10 MILLIGRAM(S): 240 TABLET, EXTENDED RELEASE ORAL at 18:53

## 2024-10-23 RX ADMIN — DILTIAZEM HYDROCHLORIDE 60 MILLIGRAM(S): 240 TABLET, EXTENDED RELEASE ORAL at 10:13

## 2024-10-23 RX ADMIN — FUROSEMIDE 40 MILLIGRAM(S): 10 INJECTION INTRAMUSCULAR; INTRAVENOUS at 11:07

## 2024-10-23 RX ADMIN — FUROSEMIDE 40 MILLIGRAM(S): 10 INJECTION INTRAMUSCULAR; INTRAVENOUS at 22:21

## 2024-10-23 RX ADMIN — FUROSEMIDE 20 MILLIGRAM(S): 10 INJECTION INTRAMUSCULAR; INTRAVENOUS at 12:10

## 2024-10-23 RX ADMIN — DILTIAZEM HYDROCHLORIDE 10 MILLIGRAM(S): 240 TABLET, EXTENDED RELEASE ORAL at 12:10

## 2024-10-23 RX ADMIN — DILTIAZEM HYDROCHLORIDE 15 MILLIGRAM(S): 240 TABLET, EXTENDED RELEASE ORAL at 10:11

## 2024-10-23 RX ADMIN — FUROSEMIDE 40 MILLIGRAM(S): 10 INJECTION INTRAMUSCULAR; INTRAVENOUS at 18:55

## 2024-10-24 LAB
ANION GAP SERPL CALC-SCNC: 8 MMOL/L — SIGNIFICANT CHANGE UP (ref 5–17)
ANION GAP SERPL CALC-SCNC: 9 MMOL/L — SIGNIFICANT CHANGE UP (ref 5–17)
BUN SERPL-MCNC: 31 MG/DL — HIGH (ref 7–23)
BUN SERPL-MCNC: 36 MG/DL — HIGH (ref 7–23)
CALCIUM SERPL-MCNC: 8.6 MG/DL — SIGNIFICANT CHANGE UP (ref 8.4–10.5)
CALCIUM SERPL-MCNC: 8.7 MG/DL — SIGNIFICANT CHANGE UP (ref 8.4–10.5)
CHLORIDE SERPL-SCNC: 104 MMOL/L — SIGNIFICANT CHANGE UP (ref 96–108)
CHLORIDE SERPL-SCNC: 105 MMOL/L — SIGNIFICANT CHANGE UP (ref 96–108)
CO2 SERPL-SCNC: 31 MMOL/L — SIGNIFICANT CHANGE UP (ref 22–31)
CO2 SERPL-SCNC: 31 MMOL/L — SIGNIFICANT CHANGE UP (ref 22–31)
CREAT SERPL-MCNC: 0.85 MG/DL — SIGNIFICANT CHANGE UP (ref 0.5–1.3)
CREAT SERPL-MCNC: 0.92 MG/DL — SIGNIFICANT CHANGE UP (ref 0.5–1.3)
EGFR: 59 ML/MIN/1.73M2 — LOW
EGFR: 59 ML/MIN/1.73M2 — LOW
EGFR: 66 ML/MIN/1.73M2 — SIGNIFICANT CHANGE UP
EGFR: 66 ML/MIN/1.73M2 — SIGNIFICANT CHANGE UP
GLUCOSE SERPL-MCNC: 140 MG/DL — HIGH (ref 70–99)
GLUCOSE SERPL-MCNC: 140 MG/DL — HIGH (ref 70–99)
HCT VFR BLD CALC: 31 % — LOW (ref 34.5–45)
HGB BLD-MCNC: 9.9 G/DL — LOW (ref 11.5–15.5)
LEGIONELLA AG UR QL: NEGATIVE — SIGNIFICANT CHANGE UP
MAGNESIUM SERPL-MCNC: 1.9 MG/DL — SIGNIFICANT CHANGE UP (ref 1.6–2.6)
MAGNESIUM SERPL-MCNC: 2 MG/DL — SIGNIFICANT CHANGE UP (ref 1.6–2.6)
MCHC RBC-ENTMCNC: 28.1 PG — SIGNIFICANT CHANGE UP (ref 27–34)
MCHC RBC-ENTMCNC: 31.9 GM/DL — LOW (ref 32–36)
MCV RBC AUTO: 88.1 FL — SIGNIFICANT CHANGE UP (ref 80–100)
MRSA PCR RESULT.: SIGNIFICANT CHANGE UP
NRBC # BLD: 0 /100 WBCS — SIGNIFICANT CHANGE UP (ref 0–0)
NRBC BLD-RTO: 0 /100 WBCS — SIGNIFICANT CHANGE UP (ref 0–0)
NT-PROBNP SERPL-SCNC: HIGH PG/ML (ref 0–300)
PHOSPHATE SERPL-MCNC: 2.1 MG/DL — LOW (ref 2.5–4.5)
PHOSPHATE SERPL-MCNC: 2.7 MG/DL — SIGNIFICANT CHANGE UP (ref 2.5–4.5)
PLATELET # BLD AUTO: 168 K/UL — SIGNIFICANT CHANGE UP (ref 150–400)
POTASSIUM SERPL-MCNC: 3.3 MMOL/L — LOW (ref 3.5–5.3)
POTASSIUM SERPL-MCNC: 4.3 MMOL/L — SIGNIFICANT CHANGE UP (ref 3.5–5.3)
POTASSIUM SERPL-SCNC: 3.3 MMOL/L — LOW (ref 3.5–5.3)
POTASSIUM SERPL-SCNC: 4.3 MMOL/L — SIGNIFICANT CHANGE UP (ref 3.5–5.3)
RBC # BLD: 3.52 M/UL — LOW (ref 3.8–5.2)
RBC # FLD: 16.8 % — HIGH (ref 10.3–14.5)
S AUREUS DNA NOSE QL NAA+PROBE: SIGNIFICANT CHANGE UP
S PNEUM AG UR QL: NEGATIVE — SIGNIFICANT CHANGE UP
SODIUM SERPL-SCNC: 144 MMOL/L — SIGNIFICANT CHANGE UP (ref 135–145)
SODIUM SERPL-SCNC: 144 MMOL/L — SIGNIFICANT CHANGE UP (ref 135–145)
TROPONIN I, HIGH SENSITIVITY RESULT: 596.2 NG/L — HIGH
TROPONIN I, HIGH SENSITIVITY RESULT: 671 NG/L — HIGH
TROPONIN I, HIGH SENSITIVITY RESULT: 815.4 NG/L — HIGH
WBC # BLD: 5.81 K/UL — SIGNIFICANT CHANGE UP (ref 3.8–10.5)
WBC # FLD AUTO: 5.81 K/UL — SIGNIFICANT CHANGE UP (ref 3.8–10.5)

## 2024-10-24 PROCEDURE — 99223 1ST HOSP IP/OBS HIGH 75: CPT

## 2024-10-24 PROCEDURE — 71045 X-RAY EXAM CHEST 1 VIEW: CPT | Mod: 26

## 2024-10-24 PROCEDURE — 93010 ELECTROCARDIOGRAM REPORT: CPT

## 2024-10-24 PROCEDURE — 76705 ECHO EXAM OF ABDOMEN: CPT | Mod: 26

## 2024-10-24 PROCEDURE — 99497 ADVNCD CARE PLAN 30 MIN: CPT | Mod: 25

## 2024-10-24 PROCEDURE — 99232 SBSQ HOSP IP/OBS MODERATE 35: CPT

## 2024-10-24 RX ORDER — FUROSEMIDE 10 MG/ML
20 INJECTION INTRAMUSCULAR; INTRAVENOUS DAILY
Refills: 0 | Status: DISCONTINUED | OUTPATIENT
Start: 2024-10-24 | End: 2024-10-24

## 2024-10-24 RX ORDER — HEPARIN SODIUM 1000 [USP'U]/ML
5000 INJECTION INTRAVENOUS; SUBCUTANEOUS EVERY 8 HOURS
Refills: 0 | Status: DISCONTINUED | OUTPATIENT
Start: 2024-10-24 | End: 2024-10-26

## 2024-10-24 RX ORDER — DIGOXIN 250 UG/1
125 TABLET ORAL
Refills: 0 | Status: COMPLETED | OUTPATIENT
Start: 2024-10-24 | End: 2024-10-24

## 2024-10-24 RX ORDER — NOREPINEPHRINE BITARTRATE 8 MG
0.05 SOLUTION INTRAVENOUS
Qty: 8 | Refills: 0 | Status: DISCONTINUED | OUTPATIENT
Start: 2024-10-24 | End: 2024-10-24

## 2024-10-24 RX ORDER — DIGOXIN 250 UG/1
62.5 TABLET ORAL DAILY
Refills: 0 | Status: DISCONTINUED | OUTPATIENT
Start: 2024-10-25 | End: 2024-10-26

## 2024-10-24 RX ORDER — ALBUMIN (HUMAN) 12.5 G/50ML
100 INJECTION, SOLUTION INTRAVENOUS EVERY 4 HOURS
Refills: 0 | Status: COMPLETED | OUTPATIENT
Start: 2024-10-24 | End: 2024-10-24

## 2024-10-24 RX ORDER — POTASSIUM PHOSPHATE, MONOBASIC POTASSIUM PHOSPHATE, DIBASIC INJECTION, 236; 224 MG/ML; MG/ML
15 SOLUTION, CONCENTRATE INTRAVENOUS ONCE
Refills: 0 | Status: COMPLETED | OUTPATIENT
Start: 2024-10-24 | End: 2024-10-24

## 2024-10-24 RX ORDER — FUROSEMIDE 10 MG/ML
20 INJECTION INTRAMUSCULAR; INTRAVENOUS ONCE
Refills: 0 | Status: COMPLETED | OUTPATIENT
Start: 2024-10-24 | End: 2024-10-24

## 2024-10-24 RX ORDER — DIGOXIN 250 UG/1
250 TABLET ORAL ONCE
Refills: 0 | Status: COMPLETED | OUTPATIENT
Start: 2024-10-24 | End: 2024-10-24

## 2024-10-24 RX ORDER — FUROSEMIDE 10 MG/ML
40 INJECTION INTRAMUSCULAR; INTRAVENOUS DAILY
Refills: 0 | Status: DISCONTINUED | OUTPATIENT
Start: 2024-10-25 | End: 2024-10-26

## 2024-10-24 RX ADMIN — DIGOXIN 125 MICROGRAM(S): 250 TABLET ORAL at 21:12

## 2024-10-24 RX ADMIN — CEFTRIAXONE 100 MILLIGRAM(S): 500 INJECTION, POWDER, FOR SOLUTION INTRAMUSCULAR; INTRAVENOUS at 12:44

## 2024-10-24 RX ADMIN — POTASSIUM PHOSPHATE, MONOBASIC POTASSIUM PHOSPHATE, DIBASIC INJECTION, 62.5 MILLIMOLE(S): 236; 224 SOLUTION, CONCENTRATE INTRAVENOUS at 02:16

## 2024-10-24 RX ADMIN — Medication 40 MILLIGRAM(S): at 18:20

## 2024-10-24 RX ADMIN — FUROSEMIDE 20 MILLIGRAM(S): 10 INJECTION INTRAMUSCULAR; INTRAVENOUS at 05:01

## 2024-10-24 RX ADMIN — Medication 40 MILLIGRAM(S): at 05:01

## 2024-10-24 RX ADMIN — FUROSEMIDE 20 MILLIGRAM(S): 10 INJECTION INTRAMUSCULAR; INTRAVENOUS at 08:07

## 2024-10-24 RX ADMIN — ALBUMIN (HUMAN) 50 MILLILITER(S): 12.5 INJECTION, SOLUTION INTRAVENOUS at 05:01

## 2024-10-24 RX ADMIN — DIGOXIN 250 MICROGRAM(S): 250 TABLET ORAL at 08:07

## 2024-10-24 RX ADMIN — DEXMEDETOMIDINE HYDROCHLORIDE IN SODIUM CHLORIDE 0.85 MICROGRAM(S)/KG/HR: 4 INJECTION INTRAVENOUS at 07:08

## 2024-10-24 RX ADMIN — Medication 255 MILLIGRAM(S): at 13:00

## 2024-10-24 RX ADMIN — Medication 100 MILLIEQUIVALENT(S): at 01:53

## 2024-10-24 RX ADMIN — Medication 100 MILLIEQUIVALENT(S): at 00:58

## 2024-10-24 RX ADMIN — HEPARIN SODIUM 5000 UNIT(S): 1000 INJECTION INTRAVENOUS; SUBCUTANEOUS at 21:12

## 2024-10-24 RX ADMIN — Medication 1 APPLICATION(S): at 05:03

## 2024-10-24 RX ADMIN — DIGOXIN 125 MICROGRAM(S): 250 TABLET ORAL at 13:05

## 2024-10-24 RX ADMIN — NOREPINEPHRINE BITARTRATE 6.38 MICROGRAM(S)/KG/MIN: 8 SOLUTION at 02:08

## 2024-10-24 RX ADMIN — Medication 100 MILLIEQUIVALENT(S): at 02:46

## 2024-10-24 RX ADMIN — ALBUMIN (HUMAN) 50 MILLILITER(S): 12.5 INJECTION, SOLUTION INTRAVENOUS at 00:44

## 2024-10-24 RX ADMIN — HEPARIN SODIUM 5000 UNIT(S): 1000 INJECTION INTRAVENOUS; SUBCUTANEOUS at 14:10

## 2024-10-25 LAB
ALBUMIN SERPL ELPH-MCNC: 3.2 G/DL — LOW (ref 3.3–5)
ALP SERPL-CCNC: 80 U/L — SIGNIFICANT CHANGE UP (ref 40–120)
ALT FLD-CCNC: 72 U/L — HIGH (ref 10–45)
ANION GAP SERPL CALC-SCNC: 8 MMOL/L — SIGNIFICANT CHANGE UP (ref 5–17)
AST SERPL-CCNC: 51 U/L — HIGH (ref 10–40)
BILIRUB DIRECT SERPL-MCNC: 0.4 MG/DL — HIGH (ref 0–0.3)
BILIRUB INDIRECT FLD-MCNC: 0.7 MG/DL — SIGNIFICANT CHANGE UP (ref 0.2–1)
BILIRUB SERPL-MCNC: 1.1 MG/DL — SIGNIFICANT CHANGE UP (ref 0.2–1.2)
BUN SERPL-MCNC: 37 MG/DL — HIGH (ref 7–23)
CALCIUM SERPL-MCNC: 9.1 MG/DL — SIGNIFICANT CHANGE UP (ref 8.4–10.5)
CHLORIDE SERPL-SCNC: 106 MMOL/L — SIGNIFICANT CHANGE UP (ref 96–108)
CO2 SERPL-SCNC: 34 MMOL/L — HIGH (ref 22–31)
CREAT SERPL-MCNC: 0.86 MG/DL — SIGNIFICANT CHANGE UP (ref 0.5–1.3)
CULTURE RESULTS: NO GROWTH — SIGNIFICANT CHANGE UP
EGFR: 65 ML/MIN/1.73M2 — SIGNIFICANT CHANGE UP
EGFR: 65 ML/MIN/1.73M2 — SIGNIFICANT CHANGE UP
GLUCOSE SERPL-MCNC: 94 MG/DL — SIGNIFICANT CHANGE UP (ref 70–99)
HCT VFR BLD CALC: 32.6 % — LOW (ref 34.5–45)
HGB BLD-MCNC: 10.9 G/DL — LOW (ref 11.5–15.5)
MAGNESIUM SERPL-MCNC: 2.2 MG/DL — SIGNIFICANT CHANGE UP (ref 1.6–2.6)
MCHC RBC-ENTMCNC: 29.1 PG — SIGNIFICANT CHANGE UP (ref 27–34)
MCHC RBC-ENTMCNC: 33.4 GM/DL — SIGNIFICANT CHANGE UP (ref 32–36)
MCV RBC AUTO: 86.9 FL — SIGNIFICANT CHANGE UP (ref 80–100)
NRBC # BLD: 0 /100 WBCS — SIGNIFICANT CHANGE UP (ref 0–0)
NRBC BLD-RTO: 0 /100 WBCS — SIGNIFICANT CHANGE UP (ref 0–0)
PHOSPHATE SERPL-MCNC: 1.5 MG/DL — LOW (ref 2.5–4.5)
PLATELET # BLD AUTO: 196 K/UL — SIGNIFICANT CHANGE UP (ref 150–400)
POTASSIUM SERPL-MCNC: 3 MMOL/L — LOW (ref 3.5–5.3)
POTASSIUM SERPL-SCNC: 3 MMOL/L — LOW (ref 3.5–5.3)
PROT SERPL-MCNC: 7.1 G/DL — SIGNIFICANT CHANGE UP (ref 6–8.3)
RBC # BLD: 3.75 M/UL — LOW (ref 3.8–5.2)
RBC # FLD: 16.5 % — HIGH (ref 10.3–14.5)
SODIUM SERPL-SCNC: 148 MMOL/L — HIGH (ref 135–145)
SPECIMEN SOURCE: SIGNIFICANT CHANGE UP
TROPONIN I, HIGH SENSITIVITY RESULT: 582.4 NG/L — HIGH
WBC # BLD: 10.41 K/UL — SIGNIFICANT CHANGE UP (ref 3.8–10.5)
WBC # FLD AUTO: 10.41 K/UL — SIGNIFICANT CHANGE UP (ref 3.8–10.5)

## 2024-10-25 PROCEDURE — 99232 SBSQ HOSP IP/OBS MODERATE 35: CPT

## 2024-10-25 PROCEDURE — 99497 ADVNCD CARE PLAN 30 MIN: CPT

## 2024-10-25 RX ORDER — METOPROLOL SUCCINATE 50 MG/1
2.5 TABLET, EXTENDED RELEASE ORAL EVERY 6 HOURS
Refills: 0 | Status: DISCONTINUED | OUTPATIENT
Start: 2024-10-25 | End: 2024-10-26

## 2024-10-25 RX ORDER — LORAZEPAM 4 MG/ML
0.5 VIAL (ML) INJECTION ONCE
Refills: 0 | Status: DISCONTINUED | OUTPATIENT
Start: 2024-10-25 | End: 2024-10-25

## 2024-10-25 RX ORDER — POTASSIUM PHOSPHATE, MONOBASIC POTASSIUM PHOSPHATE, DIBASIC INJECTION, 236; 224 MG/ML; MG/ML
15 SOLUTION, CONCENTRATE INTRAVENOUS ONCE
Refills: 0 | Status: COMPLETED | OUTPATIENT
Start: 2024-10-25 | End: 2024-10-25

## 2024-10-25 RX ORDER — LORAZEPAM 4 MG/ML
0.5 VIAL (ML) INJECTION EVERY 4 HOURS
Refills: 0 | Status: DISCONTINUED | OUTPATIENT
Start: 2024-10-25 | End: 2024-10-26

## 2024-10-25 RX ORDER — METOPROLOL SUCCINATE 50 MG/1
2.5 TABLET, EXTENDED RELEASE ORAL ONCE
Refills: 0 | Status: COMPLETED | OUTPATIENT
Start: 2024-10-25 | End: 2024-10-25

## 2024-10-25 RX ORDER — SODIUM CHLORIDE 9 G/1000ML
1000 INJECTION, SOLUTION INTRAVENOUS
Refills: 0 | Status: DISCONTINUED | OUTPATIENT
Start: 2024-10-25 | End: 2024-10-26

## 2024-10-25 RX ADMIN — METOPROLOL SUCCINATE 2.5 MILLIGRAM(S): 50 TABLET, EXTENDED RELEASE ORAL at 11:10

## 2024-10-25 RX ADMIN — METOPROLOL SUCCINATE 2.5 MILLIGRAM(S): 50 TABLET, EXTENDED RELEASE ORAL at 10:21

## 2024-10-25 RX ADMIN — HEPARIN SODIUM 5000 UNIT(S): 1000 INJECTION INTRAVENOUS; SUBCUTANEOUS at 21:59

## 2024-10-25 RX ADMIN — Medication 40 MILLIGRAM(S): at 05:19

## 2024-10-25 RX ADMIN — DIGOXIN 62.5 MICROGRAM(S): 250 TABLET ORAL at 11:09

## 2024-10-25 RX ADMIN — METOPROLOL SUCCINATE 2.5 MILLIGRAM(S): 50 TABLET, EXTENDED RELEASE ORAL at 23:49

## 2024-10-25 RX ADMIN — Medication 0.5 MILLIGRAM(S): at 11:18

## 2024-10-25 RX ADMIN — DEXMEDETOMIDINE HYDROCHLORIDE IN SODIUM CHLORIDE 0.85 MICROGRAM(S)/KG/HR: 4 INJECTION INTRAVENOUS at 01:50

## 2024-10-25 RX ADMIN — Medication 255 MILLIGRAM(S): at 13:15

## 2024-10-25 RX ADMIN — HEPARIN SODIUM 5000 UNIT(S): 1000 INJECTION INTRAVENOUS; SUBCUTANEOUS at 05:19

## 2024-10-25 RX ADMIN — CEFTRIAXONE 100 MILLIGRAM(S): 500 INJECTION, POWDER, FOR SOLUTION INTRAMUSCULAR; INTRAVENOUS at 13:14

## 2024-10-25 RX ADMIN — FUROSEMIDE 40 MILLIGRAM(S): 10 INJECTION INTRAMUSCULAR; INTRAVENOUS at 05:21

## 2024-10-25 RX ADMIN — METOPROLOL SUCCINATE 2.5 MILLIGRAM(S): 50 TABLET, EXTENDED RELEASE ORAL at 18:03

## 2024-10-25 RX ADMIN — POTASSIUM PHOSPHATE, MONOBASIC POTASSIUM PHOSPHATE, DIBASIC INJECTION, 62.5 MILLIMOLE(S): 236; 224 SOLUTION, CONCENTRATE INTRAVENOUS at 09:25

## 2024-10-25 RX ADMIN — HEPARIN SODIUM 5000 UNIT(S): 1000 INJECTION INTRAVENOUS; SUBCUTANEOUS at 16:42

## 2024-10-25 RX ADMIN — Medication 2 MILLIGRAM(S): at 17:00

## 2024-10-25 RX ADMIN — Medication 2 MILLIGRAM(S): at 16:40

## 2024-10-25 RX ADMIN — Medication 100 MILLIEQUIVALENT(S): at 08:04

## 2024-10-25 RX ADMIN — Medication 1 APPLICATION(S): at 05:20

## 2024-10-25 RX ADMIN — SODIUM CHLORIDE 75 MILLILITER(S): 9 INJECTION, SOLUTION INTRAVENOUS at 08:04

## 2024-10-25 RX ADMIN — Medication 100 MILLIEQUIVALENT(S): at 09:04

## 2024-10-25 RX ADMIN — Medication 100 MILLIEQUIVALENT(S): at 10:00

## 2024-10-26 ENCOUNTER — TRANSCRIPTION ENCOUNTER (OUTPATIENT)
Age: 89
End: 2024-10-26

## 2024-10-26 VITALS
SYSTOLIC BLOOD PRESSURE: 124 MMHG | TEMPERATURE: 98 F | DIASTOLIC BLOOD PRESSURE: 73 MMHG | RESPIRATION RATE: 18 BRPM | HEART RATE: 82 BPM | OXYGEN SATURATION: 94 %

## 2024-10-26 PROCEDURE — 99232 SBSQ HOSP IP/OBS MODERATE 35: CPT | Mod: GW

## 2024-10-26 PROCEDURE — 99233 SBSQ HOSP IP/OBS HIGH 50: CPT

## 2024-10-26 RX ORDER — ACETAMINOPHEN 500 MG/5ML
2 LIQUID (ML) ORAL
Refills: 0 | DISCHARGE

## 2024-10-26 RX ORDER — APIXABAN 2.5 MG/1
1 TABLET, FILM COATED ORAL
Refills: 0 | DISCHARGE

## 2024-10-26 RX ADMIN — DIGOXIN 62.5 MICROGRAM(S): 250 TABLET ORAL at 11:39

## 2024-10-26 RX ADMIN — METOPROLOL SUCCINATE 2.5 MILLIGRAM(S): 50 TABLET, EXTENDED RELEASE ORAL at 05:06

## 2024-10-26 RX ADMIN — Medication 2 MILLIGRAM(S): at 11:39

## 2024-10-26 RX ADMIN — Medication 0.5 MILLIGRAM(S): at 04:45

## 2024-10-26 RX ADMIN — Medication 2 MILLIGRAM(S): at 04:46

## 2024-10-26 RX ADMIN — Medication 2 MILLIGRAM(S): at 05:31

## 2024-10-26 RX ADMIN — HEPARIN SODIUM 5000 UNIT(S): 1000 INJECTION INTRAVENOUS; SUBCUTANEOUS at 05:05

## 2024-10-26 RX ADMIN — HEPARIN SODIUM 5000 UNIT(S): 1000 INJECTION INTRAVENOUS; SUBCUTANEOUS at 14:06

## 2024-10-26 RX ADMIN — METOPROLOL SUCCINATE 2.5 MILLIGRAM(S): 50 TABLET, EXTENDED RELEASE ORAL at 11:39

## 2024-10-26 RX ADMIN — Medication 40 MILLIGRAM(S): at 11:39

## 2024-10-26 RX ADMIN — CEFTRIAXONE 100 MILLIGRAM(S): 500 INJECTION, POWDER, FOR SOLUTION INTRAMUSCULAR; INTRAVENOUS at 14:06

## 2024-10-26 RX ADMIN — Medication 2 MILLIGRAM(S): at 12:30

## 2024-10-26 RX ADMIN — Medication 0.5 MILLIGRAM(S): at 14:05

## 2024-10-26 RX ADMIN — FUROSEMIDE 40 MILLIGRAM(S): 10 INJECTION INTRAMUSCULAR; INTRAVENOUS at 05:06

## 2024-10-26 RX ADMIN — Medication 255 MILLIGRAM(S): at 14:06

## 2024-11-26 PROCEDURE — 80053 COMPREHEN METABOLIC PANEL: CPT

## 2024-11-26 PROCEDURE — 76705 ECHO EXAM OF ABDOMEN: CPT

## 2024-11-26 PROCEDURE — 83735 ASSAY OF MAGNESIUM: CPT

## 2024-11-26 PROCEDURE — 71250 CT THORAX DX C-: CPT | Mod: MC

## 2024-11-26 PROCEDURE — 96374 THER/PROPH/DIAG INJ IV PUSH: CPT

## 2024-11-26 PROCEDURE — 36415 COLL VENOUS BLD VENIPUNCTURE: CPT

## 2024-11-26 PROCEDURE — 81001 URINALYSIS AUTO W/SCOPE: CPT

## 2024-11-26 PROCEDURE — 87640 STAPH A DNA AMP PROBE: CPT

## 2024-11-26 PROCEDURE — 80048 BASIC METABOLIC PNL TOTAL CA: CPT

## 2024-11-26 PROCEDURE — 99285 EMERGENCY DEPT VISIT HI MDM: CPT | Mod: 25

## 2024-11-26 PROCEDURE — 80076 HEPATIC FUNCTION PANEL: CPT

## 2024-11-26 PROCEDURE — 87641 MR-STAPH DNA AMP PROBE: CPT

## 2024-11-26 PROCEDURE — 87086 URINE CULTURE/COLONY COUNT: CPT

## 2024-11-26 PROCEDURE — 71045 X-RAY EXAM CHEST 1 VIEW: CPT

## 2024-11-26 PROCEDURE — 85027 COMPLETE CBC AUTOMATED: CPT

## 2024-11-26 PROCEDURE — 87449 NOS EACH ORGANISM AG IA: CPT

## 2024-11-26 PROCEDURE — 84443 ASSAY THYROID STIM HORMONE: CPT

## 2024-11-26 PROCEDURE — 84479 ASSAY OF THYROID (T3 OR T4): CPT

## 2024-11-26 PROCEDURE — 93306 TTE W/DOPPLER COMPLETE: CPT

## 2024-11-26 PROCEDURE — 87040 BLOOD CULTURE FOR BACTERIA: CPT

## 2024-11-26 PROCEDURE — 94660 CPAP INITIATION&MGMT: CPT

## 2024-11-26 PROCEDURE — 84100 ASSAY OF PHOSPHORUS: CPT

## 2024-11-26 PROCEDURE — 82803 BLOOD GASES ANY COMBINATION: CPT

## 2024-11-26 PROCEDURE — 0225U NFCT DS DNA&RNA 21 SARSCOV2: CPT

## 2024-11-26 PROCEDURE — 87899 AGENT NOS ASSAY W/OPTIC: CPT

## 2024-11-26 PROCEDURE — 84436 ASSAY OF TOTAL THYROXINE: CPT

## 2024-11-26 PROCEDURE — P9047: CPT

## 2024-11-26 PROCEDURE — 92610 EVALUATE SWALLOWING FUNCTION: CPT

## 2024-11-26 PROCEDURE — 83880 ASSAY OF NATRIURETIC PEPTIDE: CPT

## 2024-11-26 PROCEDURE — 93005 ELECTROCARDIOGRAM TRACING: CPT

## 2024-11-26 PROCEDURE — 84484 ASSAY OF TROPONIN QUANT: CPT

## 2024-11-26 PROCEDURE — 85025 COMPLETE CBC W/AUTO DIFF WBC: CPT

## 2024-11-26 PROCEDURE — 96376 TX/PRO/DX INJ SAME DRUG ADON: CPT

## 2024-11-26 PROCEDURE — 0241U: CPT

## 2024-11-26 PROCEDURE — 87637 SARSCOV2&INF A&B&RSV AMP PRB: CPT

## 2024-11-26 PROCEDURE — 96375 TX/PRO/DX INJ NEW DRUG ADDON: CPT

## 2025-05-28 NOTE — DISCHARGE NOTE NURSING/CASE MANAGEMENT/SOCIAL WORK - CASE MANAGER'S NAME
Detail Level: Detailed
Add 1585x Cpt? (Do Not Bill If You Billed For The Procedure Placing The Sutures. This Is An Add-On Code That Must Be Billed With An E/M Visit Code): No
Luciano Chatterjee, RN